# Patient Record
Sex: MALE | Race: WHITE | NOT HISPANIC OR LATINO
[De-identification: names, ages, dates, MRNs, and addresses within clinical notes are randomized per-mention and may not be internally consistent; named-entity substitution may affect disease eponyms.]

---

## 2019-07-02 LAB
APPEARANCE: CLEAR
BACTERIA UR CULT: NORMAL
BACTERIA: NEGATIVE
BILIRUBIN URINE: NEGATIVE
BLOOD URINE: NEGATIVE
COLOR: YELLOW
GLUCOSE QUALITATIVE U: NEGATIVE
HYALINE CASTS: 0 /LPF
KETONES URINE: NEGATIVE
LEUKOCYTE ESTERASE URINE: NEGATIVE
MICROSCOPIC-UA: NORMAL
NITRITE URINE: NEGATIVE
PH URINE: 6
PROTEIN URINE: NEGATIVE
RED BLOOD CELLS URINE: 3 /HPF
SPECIFIC GRAVITY URINE: 1.02
SQUAMOUS EPITHELIAL CELLS: 0 /HPF
UROBILINOGEN URINE: NORMAL
WHITE BLOOD CELLS URINE: 3 /HPF

## 2019-07-03 ENCOUNTER — APPOINTMENT (OUTPATIENT)
Dept: UROLOGY | Facility: CLINIC | Age: 84
End: 2019-07-03
Payer: MEDICARE

## 2019-07-03 DIAGNOSIS — N52.9 MALE ERECTILE DYSFUNCTION, UNSPECIFIED: ICD-10-CM

## 2019-07-03 DIAGNOSIS — R35.1 NOCTURIA: ICD-10-CM

## 2019-07-03 DIAGNOSIS — Z87.440 PERSONAL HISTORY OF URINARY (TRACT) INFECTIONS: ICD-10-CM

## 2019-07-03 PROCEDURE — 51798 US URINE CAPACITY MEASURE: CPT

## 2019-07-03 PROCEDURE — 99203 OFFICE O/P NEW LOW 30 MIN: CPT | Mod: 25

## 2019-07-08 LAB
APPEARANCE: CLEAR
BACTERIA UR CULT: ABNORMAL
BACTERIA: NEGATIVE
BILIRUBIN URINE: NEGATIVE
BLOOD URINE: NEGATIVE
COLOR: YELLOW
GLUCOSE QUALITATIVE U: NEGATIVE
HYALINE CASTS: 1 /LPF
KETONES URINE: NEGATIVE
LEUKOCYTE ESTERASE URINE: NEGATIVE
MICROSCOPIC-UA: NORMAL
NITRITE URINE: NEGATIVE
PH URINE: 6.5
PROTEIN URINE: NORMAL
RED BLOOD CELLS URINE: 1 /HPF
SPECIFIC GRAVITY URINE: 1.02
SQUAMOUS EPITHELIAL CELLS: 0 /HPF
UROBILINOGEN URINE: NORMAL
WHITE BLOOD CELLS URINE: 0 /HPF

## 2019-12-18 ENCOUNTER — APPOINTMENT (OUTPATIENT)
Dept: UROLOGY | Facility: CLINIC | Age: 84
End: 2019-12-18

## 2020-07-15 DIAGNOSIS — N40.1 BENIGN PROSTATIC HYPERPLASIA WITH LOWER URINARY TRACT SYMPMS: ICD-10-CM

## 2020-07-15 DIAGNOSIS — N13.8 BENIGN PROSTATIC HYPERPLASIA WITH LOWER URINARY TRACT SYMPMS: ICD-10-CM

## 2020-07-17 LAB
APPEARANCE: CLEAR
BACTERIA: NEGATIVE
BILIRUBIN URINE: NEGATIVE
BLOOD URINE: NEGATIVE
CALCIUM OXALATE CRYSTALS: ABNORMAL
COLOR: YELLOW
GLUCOSE QUALITATIVE U: NEGATIVE
HYALINE CASTS: 0 /LPF
KETONES URINE: NEGATIVE
LEUKOCYTE ESTERASE URINE: NEGATIVE
MICROSCOPIC-UA: NORMAL
NITRITE URINE: NEGATIVE
PH URINE: 6
PROTEIN URINE: NORMAL
PSA FREE FLD-MCNC: 41 %
PSA FREE SERPL-MCNC: 1.27 NG/ML
PSA SERPL-MCNC: 3.13 NG/ML
RED BLOOD CELLS URINE: 1 /HPF
SPECIFIC GRAVITY URINE: 1.02
SQUAMOUS EPITHELIAL CELLS: 0 /HPF
TESTOST SERPL-MCNC: 463 NG/DL
UROBILINOGEN URINE: NORMAL
WHITE BLOOD CELLS URINE: 0 /HPF

## 2020-07-20 LAB — BACTERIA UR CULT: NORMAL

## 2020-07-27 ENCOUNTER — APPOINTMENT (OUTPATIENT)
Dept: UROLOGY | Facility: CLINIC | Age: 85
End: 2020-07-27
Payer: MEDICARE

## 2020-07-27 VITALS
DIASTOLIC BLOOD PRESSURE: 84 MMHG | HEIGHT: 65 IN | WEIGHT: 167 LBS | BODY MASS INDEX: 27.82 KG/M2 | SYSTOLIC BLOOD PRESSURE: 138 MMHG | OXYGEN SATURATION: 96 % | HEART RATE: 69 BPM | TEMPERATURE: 98 F

## 2020-07-27 DIAGNOSIS — N40.1 BENIGN PROSTATIC HYPERPLASIA WITH LOWER URINARY TRACT SYMPMS: ICD-10-CM

## 2020-07-27 DIAGNOSIS — R30.0 DYSURIA: ICD-10-CM

## 2020-07-27 DIAGNOSIS — R35.0 FREQUENCY OF MICTURITION: ICD-10-CM

## 2020-07-27 DIAGNOSIS — N13.8 BENIGN PROSTATIC HYPERPLASIA WITH LOWER URINARY TRACT SYMPMS: ICD-10-CM

## 2020-07-27 PROCEDURE — 99213 OFFICE O/P EST LOW 20 MIN: CPT

## 2020-07-28 LAB
APPEARANCE: CLEAR
BACTERIA: NEGATIVE
BILIRUBIN URINE: NEGATIVE
BLOOD URINE: NEGATIVE
COLOR: YELLOW
GLUCOSE QUALITATIVE U: NEGATIVE
HYALINE CASTS: 0 /LPF
KETONES URINE: NEGATIVE
LEUKOCYTE ESTERASE URINE: NEGATIVE
MICROSCOPIC-UA: NORMAL
NITRITE URINE: NEGATIVE
PH URINE: 6
PROTEIN URINE: NEGATIVE
RED BLOOD CELLS URINE: 2 /HPF
SPECIFIC GRAVITY URINE: 1.02
SQUAMOUS EPITHELIAL CELLS: 0 /HPF
UROBILINOGEN URINE: NORMAL
WHITE BLOOD CELLS URINE: 0 /HPF

## 2020-12-21 PROBLEM — Z87.440 HISTORY OF URINARY TRACT INFECTION: Status: RESOLVED | Noted: 2019-07-08 | Resolved: 2020-12-21

## 2021-08-28 ENCOUNTER — INPATIENT (INPATIENT)
Facility: HOSPITAL | Age: 86
LOS: 2 days | Discharge: NURSING, REHAB OR LONG-TERM CARE | DRG: 62 | End: 2021-08-31
Attending: INTERNAL MEDICINE | Admitting: INTERNAL MEDICINE
Payer: MEDICARE

## 2021-08-28 VITALS
DIASTOLIC BLOOD PRESSURE: 71 MMHG | OXYGEN SATURATION: 99 % | HEART RATE: 78 BPM | HEIGHT: 66 IN | SYSTOLIC BLOOD PRESSURE: 127 MMHG | RESPIRATION RATE: 17 BRPM | TEMPERATURE: 99 F

## 2021-08-28 DIAGNOSIS — I63.9 CEREBRAL INFARCTION, UNSPECIFIED: ICD-10-CM

## 2021-08-28 LAB
ALBUMIN SERPL ELPH-MCNC: 3.5 G/DL — SIGNIFICANT CHANGE UP (ref 3.3–5)
ALP SERPL-CCNC: 64 U/L — SIGNIFICANT CHANGE UP (ref 40–120)
ALT FLD-CCNC: 25 U/L — SIGNIFICANT CHANGE UP (ref 12–78)
ANION GAP SERPL CALC-SCNC: 3 MMOL/L — LOW (ref 5–17)
APTT BLD: 27.7 SEC — SIGNIFICANT CHANGE UP (ref 27.5–35.5)
AST SERPL-CCNC: 16 U/L — SIGNIFICANT CHANGE UP (ref 15–37)
BASE EXCESS BLDV CALC-SCNC: 1.3 MMOL/L — SIGNIFICANT CHANGE UP (ref -2–2)
BASOPHILS # BLD AUTO: 0.06 K/UL — SIGNIFICANT CHANGE UP (ref 0–0.2)
BASOPHILS NFR BLD AUTO: 0.7 % — SIGNIFICANT CHANGE UP (ref 0–2)
BILIRUB SERPL-MCNC: 0.3 MG/DL — SIGNIFICANT CHANGE UP (ref 0.2–1.2)
BUN SERPL-MCNC: 14 MG/DL — SIGNIFICANT CHANGE UP (ref 7–23)
CALCIUM SERPL-MCNC: 8.9 MG/DL — SIGNIFICANT CHANGE UP (ref 8.5–10.1)
CHLORIDE SERPL-SCNC: 110 MMOL/L — HIGH (ref 96–108)
CO2 SERPL-SCNC: 28 MMOL/L — SIGNIFICANT CHANGE UP (ref 22–31)
CREAT SERPL-MCNC: 1.17 MG/DL — SIGNIFICANT CHANGE UP (ref 0.5–1.3)
EOSINOPHIL # BLD AUTO: 0.15 K/UL — SIGNIFICANT CHANGE UP (ref 0–0.5)
EOSINOPHIL NFR BLD AUTO: 1.8 % — SIGNIFICANT CHANGE UP (ref 0–6)
GLUCOSE SERPL-MCNC: 89 MG/DL — SIGNIFICANT CHANGE UP (ref 70–99)
HCO3 BLDV-SCNC: 27 MMOL/L — SIGNIFICANT CHANGE UP (ref 21–29)
HCT VFR BLD CALC: 39.8 % — SIGNIFICANT CHANGE UP (ref 39–50)
HGB BLD-MCNC: 13.8 G/DL — SIGNIFICANT CHANGE UP (ref 13–17)
IMM GRANULOCYTES NFR BLD AUTO: 0.2 % — SIGNIFICANT CHANGE UP (ref 0–1.5)
INR BLD: 1.04 RATIO — SIGNIFICANT CHANGE UP (ref 0.88–1.16)
LYMPHOCYTES # BLD AUTO: 1.98 K/UL — SIGNIFICANT CHANGE UP (ref 1–3.3)
LYMPHOCYTES # BLD AUTO: 24.2 % — SIGNIFICANT CHANGE UP (ref 13–44)
MCHC RBC-ENTMCNC: 33.8 PG — SIGNIFICANT CHANGE UP (ref 27–34)
MCHC RBC-ENTMCNC: 34.7 GM/DL — SIGNIFICANT CHANGE UP (ref 32–36)
MCV RBC AUTO: 97.5 FL — SIGNIFICANT CHANGE UP (ref 80–100)
MONOCYTES # BLD AUTO: 1.11 K/UL — HIGH (ref 0–0.9)
MONOCYTES NFR BLD AUTO: 13.6 % — SIGNIFICANT CHANGE UP (ref 2–14)
NEUTROPHILS # BLD AUTO: 4.86 K/UL — SIGNIFICANT CHANGE UP (ref 1.8–7.4)
NEUTROPHILS NFR BLD AUTO: 59.5 % — SIGNIFICANT CHANGE UP (ref 43–77)
PCO2 BLDV: 49 MMHG — SIGNIFICANT CHANGE UP (ref 35–50)
PH BLDV: 7.36 — SIGNIFICANT CHANGE UP (ref 7.35–7.45)
PLATELET # BLD AUTO: 206 K/UL — SIGNIFICANT CHANGE UP (ref 150–400)
PO2 BLDV: 47 MMHG — HIGH (ref 25–45)
POTASSIUM SERPL-MCNC: 3.7 MMOL/L — SIGNIFICANT CHANGE UP (ref 3.5–5.3)
POTASSIUM SERPL-SCNC: 3.7 MMOL/L — SIGNIFICANT CHANGE UP (ref 3.5–5.3)
PROT SERPL-MCNC: 6.9 GM/DL — SIGNIFICANT CHANGE UP (ref 6–8.3)
PROTHROM AB SERPL-ACNC: 12.2 SEC — SIGNIFICANT CHANGE UP (ref 10.6–13.6)
RBC # BLD: 4.08 M/UL — LOW (ref 4.2–5.8)
RBC # FLD: 13.2 % — SIGNIFICANT CHANGE UP (ref 10.3–14.5)
SAO2 % BLDV: 81 % — SIGNIFICANT CHANGE UP (ref 67–88)
SODIUM SERPL-SCNC: 141 MMOL/L — SIGNIFICANT CHANGE UP (ref 135–145)
TROPONIN I SERPL-MCNC: <0.015 NG/ML — SIGNIFICANT CHANGE UP (ref 0.01–0.04)
WBC # BLD: 8.18 K/UL — SIGNIFICANT CHANGE UP (ref 3.8–10.5)
WBC # FLD AUTO: 8.18 K/UL — SIGNIFICANT CHANGE UP (ref 3.8–10.5)

## 2021-08-28 PROCEDURE — 92610 EVALUATE SWALLOWING FUNCTION: CPT | Mod: GN

## 2021-08-28 PROCEDURE — 93320 DOPPLER ECHO COMPLETE: CPT

## 2021-08-28 PROCEDURE — 93005 ELECTROCARDIOGRAM TRACING: CPT

## 2021-08-28 PROCEDURE — 93312 ECHO TRANSESOPHAGEAL: CPT

## 2021-08-28 PROCEDURE — 81003 URINALYSIS AUTO W/O SCOPE: CPT

## 2021-08-28 PROCEDURE — 0042T: CPT

## 2021-08-28 PROCEDURE — 83735 ASSAY OF MAGNESIUM: CPT

## 2021-08-28 PROCEDURE — 93010 ELECTROCARDIOGRAM REPORT: CPT

## 2021-08-28 PROCEDURE — 70496 CT ANGIOGRAPHY HEAD: CPT | Mod: 26,MA

## 2021-08-28 PROCEDURE — 80061 LIPID PANEL: CPT

## 2021-08-28 PROCEDURE — 70551 MRI BRAIN STEM W/O DYE: CPT

## 2021-08-28 PROCEDURE — 97116 GAIT TRAINING THERAPY: CPT | Mod: GP

## 2021-08-28 PROCEDURE — 86769 SARS-COV-2 COVID-19 ANTIBODY: CPT

## 2021-08-28 PROCEDURE — 85027 COMPLETE CBC AUTOMATED: CPT

## 2021-08-28 PROCEDURE — 84100 ASSAY OF PHOSPHORUS: CPT

## 2021-08-28 PROCEDURE — 70498 CT ANGIOGRAPHY NECK: CPT | Mod: 26,MA

## 2021-08-28 PROCEDURE — 93306 TTE W/DOPPLER COMPLETE: CPT

## 2021-08-28 PROCEDURE — 97530 THERAPEUTIC ACTIVITIES: CPT | Mod: GP

## 2021-08-28 PROCEDURE — 99291 CRITICAL CARE FIRST HOUR: CPT

## 2021-08-28 PROCEDURE — 80048 BASIC METABOLIC PNL TOTAL CA: CPT

## 2021-08-28 PROCEDURE — 36415 COLL VENOUS BLD VENIPUNCTURE: CPT

## 2021-08-28 PROCEDURE — 93325 DOPPLER ECHO COLOR FLOW MAPG: CPT

## 2021-08-28 PROCEDURE — 92523 SPEECH SOUND LANG COMPREHEN: CPT | Mod: GN

## 2021-08-28 RX ORDER — ALTEPLASE 100 MG
6.6 KIT INTRAVENOUS ONCE
Refills: 0 | Status: COMPLETED | OUTPATIENT
Start: 2021-08-28 | End: 2021-08-28

## 2021-08-28 RX ORDER — ASPIRIN/CALCIUM CARB/MAGNESIUM 324 MG
300 TABLET ORAL DAILY
Refills: 0 | Status: DISCONTINUED | OUTPATIENT
Start: 2021-08-28 | End: 2021-08-28

## 2021-08-28 RX ORDER — ALTEPLASE 100 MG
59.5 KIT INTRAVENOUS ONCE
Refills: 0 | Status: COMPLETED | OUTPATIENT
Start: 2021-08-28 | End: 2021-08-28

## 2021-08-28 RX ORDER — ATORVASTATIN CALCIUM 80 MG/1
40 TABLET, FILM COATED ORAL AT BEDTIME
Refills: 0 | Status: DISCONTINUED | OUTPATIENT
Start: 2021-08-28 | End: 2021-08-28

## 2021-08-28 RX ORDER — ATORVASTATIN CALCIUM 80 MG/1
80 TABLET, FILM COATED ORAL AT BEDTIME
Refills: 0 | Status: DISCONTINUED | OUTPATIENT
Start: 2021-08-28 | End: 2021-08-31

## 2021-08-28 RX ADMIN — ALTEPLASE 59.5 MILLIGRAM(S): KIT at 18:09

## 2021-08-28 RX ADMIN — ALTEPLASE 59.5 MILLIGRAM(S): KIT at 19:09

## 2021-08-28 RX ADMIN — ALTEPLASE 6.6 MILLIGRAM(S): KIT at 18:08

## 2021-08-28 RX ADMIN — ALTEPLASE 396 MILLIGRAM(S): KIT at 18:07

## 2021-08-28 NOTE — ED PROVIDER NOTE - NSICDXPASTSURGICALHX_GEN_ALL_CORE_FT
PAST SURGICAL HISTORY:  History of Cholecystectomy     History of Prior Ablation Treatment     S/P Cataract Surgery left eye

## 2021-08-28 NOTE — ED PROVIDER NOTE - OBJECTIVE STATEMENT
86 year old male with PMHx of benign prostatic hypertrophy, diverticulosis, GERD, supraventricular tachycardia s/p ablation, HLD, osteoarthritis, osteoporosis, cholecystectomy, s/p left cataract surgery presents to the ED BIBEMS c/o sudden onset left UE weakness. EMS reports that the weakness began 90 minutes PTA. Upon arrival, pt has continued significant left UE weakness. Pt reports that he woke up from a nap and couldn't move his left arm, as well as felt numbness in the left arm. Pt further c/o right-sided HA. 86 year old male with PMHx of benign prostatic hypertrophy, diverticulosis, GERD, supraventricular tachycardia s/p ablation, HLD, osteoarthritis, osteoporosis, cholecystectomy, s/p left cataract surgery presents to the ED BIBEMS c/o sudden onset left UE weakness. EMS reports that the weakness began 90 minutes PTA. Upon arrival, pt has continued significant left UE weakness. Pt reports that he woke up from a nap and couldn't move his left arm, as well as felt numbness in the left arm. Pt further c/o right-sided HA. Pt does not take anticoagulants.

## 2021-08-28 NOTE — ED ADULT NURSE NOTE - NSIMPLEMENTINTERV_GEN_ALL_ED
Implemented All Fall with Harm Risk Interventions:  [place] to call system. Call bell, personal items and telephone within reach. Instruct patient to call for assistance. Room bathroom lighting operational. Non-slip footwear when patient is off stretcher. Physically safe environment: no spills, clutter or unnecessary equipment. Stretcher in lowest position, wheels locked, appropriate side rails in place. Provide visual cue, wrist band, yellow gown, etc. Monitor gait and stability. Monitor for mental status changes and reorient to person, place, and time. Review medications for side effects contributing to fall risk. Reinforce activity limits and safety measures with patient and family. Provide visual clues: red socks.

## 2021-08-28 NOTE — H&P ADULT - HISTORY OF PRESENT ILLNESS
Patient is an 86 male who awoke from a nap and he had L arm weakness and tingeing  In the ED he had a negative HCT and was given TPA.  He denies palpitations  He did have an ablation several year ago but denies a Hx of AFIB

## 2021-08-28 NOTE — H&P ADULT - ASSESSMENT
A/P: 86 male who is presenting with a CVA    Plan::  Admit to the ICU  NPO  Q1H neuro checks  Echo  Glycemic sontrol. Lipid profile in AM  A1C in AM  Swallow eval  PT matheus Richardson  Neuro consult

## 2021-08-28 NOTE — ED ADULT TRIAGE NOTE - CHIEF COMPLAINT QUOTE
pt p/w c/o sudden onset left sided weakness which started aprx 90' ago as per EMS.  Upon arrival pt has significant left upper extremity weakness. bgm 97 in triage.

## 2021-08-28 NOTE — ED PROVIDER NOTE - NSICDXPASTMEDICALHX_GEN_ALL_CORE_FT
PAST MEDICAL HISTORY:  Benign Prostatic Hypertrophy     Diverticulosis     GERD (Gastroesophageal Reflux Disease)     History of Supraventricular Tachycardia s/p ablation    Hyperlipidemia     Osteoarthritis     Osteoporosis

## 2021-08-28 NOTE — ED ADULT NURSE NOTE - OBJECTIVE STATEMENT
Pt presents to er with complaints of LUE weakness and left sided facial droop 90mins prior to arrival, pt code stroke called and sent to ct for meeting criteria for TPA, as per , hold TPA administration until radiology read is performed, TPA administered as per 's instruction at 18:07 and infusion initiated at 18:08 (see NIH scale for full score and stroke information). TPA infusing at this time with 1:1 nursing at bedside, pt denies new onset headache, mood is appropriate and pleasant, A&OX4 and gives verbal consent for TPA administration. Med verified with Ana Laura HAWTHORNE

## 2021-08-28 NOTE — ED PROVIDER NOTE - NEUROLOGICAL, MLM
Alert and oriented, no focal deficits, sensory deficits. Left UE weakness. Alert and oriented, no focal deficits, sensory deficits. Left UE weakness. Mild left facial troop. Alert and oriented. Left UE weakness. Left UE numbness. Mild left facial droop.

## 2021-08-28 NOTE — ED ADULT NURSE REASSESSMENT NOTE - NS ED NURSE REASSESS COMMENT FT1
Jordyn [phone] who is pt's daughter and emergency contact is taking pt's wallet home with her at this time.

## 2021-08-28 NOTE — ED PROVIDER NOTE - SKIN, MLM
Skin normal color for race, warm, dry and intact. No evidence of rash. Left elbow abrasions. No evidence of rash.

## 2021-08-28 NOTE — ED PROVIDER NOTE - PROGRESS NOTE DETAILS
Keo ALLEN for Dr. Moon: Delayed TPA due to radiology not reading study. Keo ALLEN for Dr. Moon: Telestroke cart never connected. Discussed case with Telestroke after giving TPA. They do not feel that pt needs transfer.  ICU aware. Will update family.

## 2021-08-29 LAB
A1C WITH ESTIMATED AVERAGE GLUCOSE RESULT: 5.6 % — SIGNIFICANT CHANGE UP (ref 4–5.6)
ANION GAP SERPL CALC-SCNC: 6 MMOL/L — SIGNIFICANT CHANGE UP (ref 5–17)
APPEARANCE UR: CLEAR — SIGNIFICANT CHANGE UP
BILIRUB UR-MCNC: NEGATIVE — SIGNIFICANT CHANGE UP
BUN SERPL-MCNC: 14 MG/DL — SIGNIFICANT CHANGE UP (ref 7–23)
CALCIUM SERPL-MCNC: 8.5 MG/DL — SIGNIFICANT CHANGE UP (ref 8.5–10.1)
CHLORIDE SERPL-SCNC: 108 MMOL/L — SIGNIFICANT CHANGE UP (ref 96–108)
CO2 SERPL-SCNC: 26 MMOL/L — SIGNIFICANT CHANGE UP (ref 22–31)
COLOR SPEC: YELLOW — SIGNIFICANT CHANGE UP
CREAT SERPL-MCNC: 0.98 MG/DL — SIGNIFICANT CHANGE UP (ref 0.5–1.3)
DIFF PNL FLD: NEGATIVE — SIGNIFICANT CHANGE UP
ESTIMATED AVERAGE GLUCOSE: 114 MG/DL — SIGNIFICANT CHANGE UP (ref 68–114)
GLUCOSE SERPL-MCNC: 88 MG/DL — SIGNIFICANT CHANGE UP (ref 70–99)
GLUCOSE UR QL: NEGATIVE MG/DL — SIGNIFICANT CHANGE UP
HCT VFR BLD CALC: 39.1 % — SIGNIFICANT CHANGE UP (ref 39–50)
HGB BLD-MCNC: 13.4 G/DL — SIGNIFICANT CHANGE UP (ref 13–17)
KETONES UR-MCNC: NEGATIVE — SIGNIFICANT CHANGE UP
LEUKOCYTE ESTERASE UR-ACNC: NEGATIVE — SIGNIFICANT CHANGE UP
MAGNESIUM SERPL-MCNC: 2 MG/DL — SIGNIFICANT CHANGE UP (ref 1.6–2.6)
MCHC RBC-ENTMCNC: 33.5 PG — SIGNIFICANT CHANGE UP (ref 27–34)
MCHC RBC-ENTMCNC: 34.3 GM/DL — SIGNIFICANT CHANGE UP (ref 32–36)
MCV RBC AUTO: 97.8 FL — SIGNIFICANT CHANGE UP (ref 80–100)
NITRITE UR-MCNC: NEGATIVE — SIGNIFICANT CHANGE UP
PH UR: 8 — SIGNIFICANT CHANGE UP (ref 5–8)
PLATELET # BLD AUTO: 205 K/UL — SIGNIFICANT CHANGE UP (ref 150–400)
POTASSIUM SERPL-MCNC: 4 MMOL/L — SIGNIFICANT CHANGE UP (ref 3.5–5.3)
POTASSIUM SERPL-SCNC: 4 MMOL/L — SIGNIFICANT CHANGE UP (ref 3.5–5.3)
PROT UR-MCNC: NEGATIVE MG/DL — SIGNIFICANT CHANGE UP
RBC # BLD: 4 M/UL — LOW (ref 4.2–5.8)
RBC # FLD: 13.2 % — SIGNIFICANT CHANGE UP (ref 10.3–14.5)
SODIUM SERPL-SCNC: 140 MMOL/L — SIGNIFICANT CHANGE UP (ref 135–145)
SP GR SPEC: 1.01 — SIGNIFICANT CHANGE UP (ref 1.01–1.02)
UROBILINOGEN FLD QL: NEGATIVE MG/DL — SIGNIFICANT CHANGE UP
WBC # BLD: 8.99 K/UL — SIGNIFICANT CHANGE UP (ref 3.8–10.5)
WBC # FLD AUTO: 8.99 K/UL — SIGNIFICANT CHANGE UP (ref 3.8–10.5)

## 2021-08-29 PROCEDURE — 99223 1ST HOSP IP/OBS HIGH 75: CPT

## 2021-08-29 PROCEDURE — 99291 CRITICAL CARE FIRST HOUR: CPT

## 2021-08-29 PROCEDURE — 70551 MRI BRAIN STEM W/O DYE: CPT | Mod: 26

## 2021-08-29 RX ORDER — ASPIRIN/CALCIUM CARB/MAGNESIUM 324 MG
325 TABLET ORAL DAILY
Refills: 0 | Status: DISCONTINUED | OUTPATIENT
Start: 2021-08-29 | End: 2021-08-31

## 2021-08-29 RX ORDER — ACETAMINOPHEN 500 MG
650 TABLET ORAL ONCE
Refills: 0 | Status: COMPLETED | OUTPATIENT
Start: 2021-08-29 | End: 2021-08-29

## 2021-08-29 RX ADMIN — ATORVASTATIN CALCIUM 80 MILLIGRAM(S): 80 TABLET, FILM COATED ORAL at 23:03

## 2021-08-29 RX ADMIN — Medication 650 MILLIGRAM(S): at 05:08

## 2021-08-29 RX ADMIN — Medication 325 MILLIGRAM(S): at 18:59

## 2021-08-29 RX ADMIN — Medication 650 MILLIGRAM(S): at 06:00

## 2021-08-29 NOTE — PHYSICAL THERAPY INITIAL EVALUATION ADULT - PERTINENT HX OF CURRENT PROBLEM, REHAB EVAL
86 male who awoke from a nap and he had L arm weakness and tingeing  In the ED he had a negative HCT and was given TPA.  He denies palpitations  He did have an ablation several year ago but denies a Hx of AFIB

## 2021-08-29 NOTE — SWALLOW BEDSIDE ASSESSMENT ADULT - SWALLOW EVAL: RECOMMENDED FEEDING/EATING TECHNIQUES
allow for swallow between intakes/alternate food with liquid/hard swallow w/ each bite or sip/tuck chin

## 2021-08-29 NOTE — SWALLOW BEDSIDE ASSESSMENT ADULT - SWALLOW EVAL: RECOMMENDED DIET
regular consistency solid and liquid:  small sip . set up tray and open containers.  left arm weakness.

## 2021-08-29 NOTE — SWALLOW BEDSIDE ASSESSMENT ADULT - SLP GENERAL OBSERVATIONS
pt seated in bed, upright and awake: restricted eye gaze  left eye.  immediately interactively verbal and conversational. Motor speech within normal limits; no language deficits.

## 2021-08-29 NOTE — CONSULT NOTE ADULT - SUBJECTIVE AND OBJECTIVE BOX
Patient is a 86y old  Male who presents with a chief complaint of CVA. Called to see pt evaluate Post TPA given yesterday.     HPI:  Patient is an 86 male who awoke from a nap and he had L arm weakness and tingeing  In the ED he had a negative HCT and was given TPA.  He denies palpitations  He did have an ablation several year ago but denies a Hx of AFIB . Today still has left side weakness Arm> than LE. Speech dysarthric and [place] asking for hearing aids. c/o Mild Rt side headache . No other c/o.       PAST MEDICAL & SURGICAL HISTORY:  GERD (Gastroesophageal Reflux Disease)  Hyperlipidemia  Benign Prostatic Hypertrophy  Diverticulosis  Osteoarthritis  Osteoporosis  History of Supraventricular Tachycardia  s/p ablation  History of Cholecystectomy  History of Prior Ablation Treatment  S/P Cataract Surgery  left eye    FAMILY HISTORY:    Social Hx:  Nonsmoker, no drug or alcohol use    MEDICATIONS  (STANDING):  atorvastatin 80 milliGRAM(s) Oral at bedtime       Allergies  sulfa drugs (Unknown)    ROS: Pertinent positives in HPI, all other ROS were reviewed and are negative.      Vital Signs Last 24 Hrs  T(C): 36.7 (29 Aug 2021 09:00), Max: 37 (28 Aug 2021 17:19)  T(F): 98 (29 Aug 2021 09:00), Max: 98.6 (28 Aug 2021 17:19)  HR: 63 (29 Aug 2021 10:00) (54 - 78)  BP: 136/75 (29 Aug 2021 10:00) (97/59 - 157/78)  BP(mean): 92 (29 Aug 2021 10:00) (65 - 125)  RR: 14 (29 Aug 2021 10:00) (12 - 21)  SpO2: 97% (29 Aug 2021 10:00) (94% - 100%)    Constitutional: awake and alert.  HEENT: PERRLA, EOMI,   Neck: Supple.  Respiratory: Breath sounds are clear bilaterally  Cardiovascular: S1 and S2, regular  rhythm  Gastrointestinal: soft, nontender  Extremities:  no edema  Vascular:  Carotid Bruit - no  Musculoskeletal: no joint swelling/tenderness, left side weakness  Skin: No rashes    Neurological exam:  HF: A x O x 3. Appropriately interactive, normal affect. Speech Dysarthric   CN: MICHELLE, EOMI, Left VF neglect, Vf defect  facial sensation  decreased., left facial weakness [place] , Gag   not be tested.  Motor: left UE weakness 2/5  LLE 4/5 Rt side 5/5     Sens: Intact to light touch    Reflexes:  slightly brisk Left > Rt Plantars Down Rt Up going on Left  Coord:  Not able to do on left   Gait/Balance: Cannot test    NIHSS: 8      Labs:   08-29    140  |  108  |  14  ----------------------------<  88  4.0   |  26  |  0.98    Ca    8.5      29 Aug 2021 06:17  Phos  2.9     08-29  Mg     2.0     08-29    TPro  6.9  /  Alb  3.5  /  TBili  0.3  /  DBili  x   /  AST  16  /  ALT  25  /  AlkPhos  64  08-28                          13.4   8.99  )-----------( 205      ( 29 Aug 2021 06:17 )             39.1       Radiology:  - CT Head:  < from: CT Brain Stroke Protocol (08.28.21 @ 17:25) >  IMPRESSION:      1. The perfusion study is abnormal.  2. Focal calcified plaque in the proximal internal carotid artery on the right with at least moderate narrowing but no occlusion.  This may account for hypoperfusion of 254 mL at theTmax> 4 seconds. There is 20 mL hypoperfusion in the distal right MCA territory at the 6 second threshold  3. The other head and neck vessels are patent. No intraluminal thrombus noted intracranially.  4. A noncontrast CT study shows no evidence of hemorrhage, with generalized volume loss and involutional change. 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    Follow-up MR imaging of the brain recommended for further assessment.    < end of copied text >

## 2021-08-29 NOTE — PROVIDER CONTACT NOTE (EICU) - BACKGROUND
passed swallow evaluation per bedside nurse.  primary ICU team attending to another ICU patient at present time

## 2021-08-29 NOTE — SWALLOW BEDSIDE ASSESSMENT ADULT - ORAL PHASE
able to masticate despite facial weakness. lingual sweep for collection. pt talked while chewing dry cracker, and was inattentive to direction not to do so. Thisis likely his habitual practice./Within functional limits

## 2021-08-29 NOTE — SWALLOW BEDSIDE ASSESSMENT ADULT - SWALLOW EVAL: CRITERIA FOR SKILLED INTERVENTION MET
kandy follow briefly for continued tolerance./demonstrates skilled criteria for swallowing intervention

## 2021-08-29 NOTE — SWALLOW BEDSIDE ASSESSMENT ADULT - NS SPL SWALLOW CLINIC TRIAL FT
Strategies were disc, but pt appeared mildly inattentive:  Take liquid in small bolus volumes, be aware of weakness on the left side where food might pocket. clean mouth after meals.  do not rush eating.   meds as tolerated.   service will follow peripherally. Disc with Nsg.

## 2021-08-29 NOTE — SWALLOW BEDSIDE ASSESSMENT ADULT - SWALLOW EVAL: DIAGNOSIS
mild oral dysphagia 2/2 to present facial droop and weakness, but can manage regular consistency: somewhat dismissive and inattentive to strategies for maintaining swallow safety..  Motor speech is normally fluent, with no overt s/s dysarthria.  pt has relatively strong [place] accent. No overt s/s primary linguistic pathology. Given ocus of CVA, that is in any event not expected.

## 2021-08-29 NOTE — SWALLOW BEDSIDE ASSESSMENT ADULT - PHARYNGEAL PHASE
timely onset of pharyngeal swallow with observable laryngeal lift. No overt immediate s/s aspiration

## 2021-08-29 NOTE — SWALLOW BEDSIDE ASSESSMENT ADULT - COMMENTS
6 male who awoke from a nap and he had L arm weakness and tingeing  In the ED he had a negative HCT and was given TPA.  He denies palpitations  He did have an ablation several year ago but denies a Hx of AFIB.  Service is asked to evaluate for speech and for swallow functions.  Pt is [place] and wears hearing aids

## 2021-08-30 DIAGNOSIS — I63.9 CEREBRAL INFARCTION, UNSPECIFIED: ICD-10-CM

## 2021-08-30 DIAGNOSIS — E78.5 HYPERLIPIDEMIA, UNSPECIFIED: ICD-10-CM

## 2021-08-30 LAB
ANION GAP SERPL CALC-SCNC: 4 MMOL/L — LOW (ref 5–17)
BUN SERPL-MCNC: 16 MG/DL — SIGNIFICANT CHANGE UP (ref 7–23)
CALCIUM SERPL-MCNC: 8.5 MG/DL — SIGNIFICANT CHANGE UP (ref 8.5–10.1)
CHLORIDE SERPL-SCNC: 109 MMOL/L — HIGH (ref 96–108)
CHOLEST SERPL-MCNC: 124 MG/DL — SIGNIFICANT CHANGE UP
CO2 SERPL-SCNC: 26 MMOL/L — SIGNIFICANT CHANGE UP (ref 22–31)
COVID-19 SPIKE DOMAIN AB INTERP: POSITIVE
COVID-19 SPIKE DOMAIN ANTIBODY RESULT: 210 U/ML — HIGH
CREAT SERPL-MCNC: 0.9 MG/DL — SIGNIFICANT CHANGE UP (ref 0.5–1.3)
GLUCOSE SERPL-MCNC: 98 MG/DL — SIGNIFICANT CHANGE UP (ref 70–99)
HCT VFR BLD CALC: 38.8 % — LOW (ref 39–50)
HDLC SERPL-MCNC: 35 MG/DL — LOW
HGB BLD-MCNC: 13.9 G/DL — SIGNIFICANT CHANGE UP (ref 13–17)
LIPID PNL WITH DIRECT LDL SERPL: 74 MG/DL — SIGNIFICANT CHANGE UP
MAGNESIUM SERPL-MCNC: 2.1 MG/DL — SIGNIFICANT CHANGE UP (ref 1.6–2.6)
MCHC RBC-ENTMCNC: 34.2 PG — HIGH (ref 27–34)
MCHC RBC-ENTMCNC: 35.8 GM/DL — SIGNIFICANT CHANGE UP (ref 32–36)
MCV RBC AUTO: 95.6 FL — SIGNIFICANT CHANGE UP (ref 80–100)
NON HDL CHOLESTEROL: 90 MG/DL — SIGNIFICANT CHANGE UP
PHOSPHATE SERPL-MCNC: 2.6 MG/DL — SIGNIFICANT CHANGE UP (ref 2.5–4.5)
PLATELET # BLD AUTO: 191 K/UL — SIGNIFICANT CHANGE UP (ref 150–400)
POTASSIUM SERPL-MCNC: 4.1 MMOL/L — SIGNIFICANT CHANGE UP (ref 3.5–5.3)
POTASSIUM SERPL-SCNC: 4.1 MMOL/L — SIGNIFICANT CHANGE UP (ref 3.5–5.3)
RBC # BLD: 4.06 M/UL — LOW (ref 4.2–5.8)
RBC # FLD: 13 % — SIGNIFICANT CHANGE UP (ref 10.3–14.5)
SARS-COV-2 IGG+IGM SERPL QL IA: 210 U/ML — HIGH
SARS-COV-2 IGG+IGM SERPL QL IA: POSITIVE
SODIUM SERPL-SCNC: 139 MMOL/L — SIGNIFICANT CHANGE UP (ref 135–145)
TRIGL SERPL-MCNC: 78 MG/DL — SIGNIFICANT CHANGE UP
WBC # BLD: 8.22 K/UL — SIGNIFICANT CHANGE UP (ref 3.8–10.5)
WBC # FLD AUTO: 8.22 K/UL — SIGNIFICANT CHANGE UP (ref 3.8–10.5)

## 2021-08-30 PROCEDURE — 99223 1ST HOSP IP/OBS HIGH 75: CPT

## 2021-08-30 PROCEDURE — 99232 SBSQ HOSP IP/OBS MODERATE 35: CPT

## 2021-08-30 PROCEDURE — 99233 SBSQ HOSP IP/OBS HIGH 50: CPT

## 2021-08-30 PROCEDURE — 93306 TTE W/DOPPLER COMPLETE: CPT | Mod: 26

## 2021-08-30 RX ORDER — LATANOPROST 0.05 MG/ML
1 SOLUTION/ DROPS OPHTHALMIC; TOPICAL AT BEDTIME
Refills: 0 | Status: DISCONTINUED | OUTPATIENT
Start: 2021-08-30 | End: 2021-08-31

## 2021-08-30 RX ORDER — HEPARIN SODIUM 5000 [USP'U]/ML
5000 INJECTION INTRAVENOUS; SUBCUTANEOUS EVERY 8 HOURS
Refills: 0 | Status: DISCONTINUED | OUTPATIENT
Start: 2021-08-30 | End: 2021-08-31

## 2021-08-30 RX ADMIN — Medication 325 MILLIGRAM(S): at 11:41

## 2021-08-30 RX ADMIN — HEPARIN SODIUM 5000 UNIT(S): 5000 INJECTION INTRAVENOUS; SUBCUTANEOUS at 20:31

## 2021-08-30 RX ADMIN — ATORVASTATIN CALCIUM 80 MILLIGRAM(S): 80 TABLET, FILM COATED ORAL at 20:31

## 2021-08-30 RX ADMIN — LATANOPROST 1 DROP(S): 0.05 SOLUTION/ DROPS OPHTHALMIC; TOPICAL at 20:31

## 2021-08-30 RX ADMIN — HEPARIN SODIUM 5000 UNIT(S): 5000 INJECTION INTRAVENOUS; SUBCUTANEOUS at 14:56

## 2021-08-30 NOTE — CONSULT NOTE ADULT - SUBJECTIVE AND OBJECTIVE BOX
86yM was admitted on     In ED, GCS=    Patient is a 86y old  Male who presents with a chief complaint of CVA (29 Aug 2021 12:11)    HPI:  Patient is an 86 male who awoke from a nap and he had L arm weakness and tingleing  In the ED he had a negative HCT and was given TPA.  He denies palpitations  He did have an ablation several year ago but denies a Hx of AFIB (28 Aug 2021 18:40)l    Imaging performed:  MR Head: Small acute uncomplicated right watershed infarct as above  EXAM: CT PERFUSION W MAPS IC  EXAM: CT ANGIO BRAIN (W)AW IC  EXAM: CT BRAIN STROKE PROTOCOL  EXAM: CT ANGIO NECK (W)AW IC  PROCEDURE DATE: 2021    IMPRESSION:  1. The perfusion study is abnormal.  2. Focal calcified plaque in the proximal internal carotid artery on the right with at least moderate narrowing but no occlusion. This may account for hypoperfusion of 254 mL at theTmax> 4 seconds. There is 20 mL hypoperfusion in the distal right MCA territory at the 6 second threshold  3. The other head and neck vessels are patent. No intraluminal thrombus noted intracranially.  4. A noncontrast CT study shows no evidence of hemorrhage, with generalized volume loss and involutional change. 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    REVIEW OF SYSTEMS  Constitutional - No fever, No weight loss, No fatigue  HEENT - No eye pain, No visual disturbances, No difficulty hearing, No tinnitus, No vertigo, No neck pain  Respiratory - No cough, No wheezing, No shortness of breath  Cardiovascular - No chest pain, No palpitations  Gastrointestinal - No abdominal pain, No nausea, No vomiting, No diarrhea, No constipation  Genitourinary - No dysuria, No frequency, No hematuria, No incontinence  Neurological - No headaches, No memory loss, No loss of strength, No numbness, No tremors  Skin - No itching, No rashes, No lesions   Endocrine - No temperature intolerance  Musculoskeletal - No joint pain, No joint swelling, No muscle pain  Psychiatric - No depression, No anxiety    VITALS  T(C): 36.8 (21 @ 06:53), Max: 37 (21 @ 21:27)  HR: 76 (21 @ 09:00) (55 - 77)  BP: 124/76 (21 @ 09:00) (97/60 - 136/75)  RR: 14 (21 @ 09:00) (10 - 17)  SpO2: 97% (21 @ 07:00) (92% - 98%)  Wt(kg): --    PAST MEDICAL & SURGICAL HISTORY  GERD (Gastroesophageal Reflux Disease)    Hyperlipidemia    Benign Prostatic Hypertrophy    Diverticulosis    Osteoarthritis    Osteoporosis    History of Supraventricular Tachycardia    History of Cholecystectomy    History of Prior Ablation Treatment    S/P Cataract Surgery        SOCIAL HISTORY - as per documentation/history  Smoking - None  EtOH - None  Drugs - None    FUNCTIONAL HISTORY  Lives   Independent    CURRENT FUNCTIONAL STATUS      FAMILY HISTORY       RECENT LABS - Reviewed  CBC Full  -  ( 30 Aug 2021 06:25 )  WBC Count : 8.22 K/uL  RBC Count : 4.06 M/uL  Hemoglobin : 13.9 g/dL  Hematocrit : 38.8 %  Platelet Count - Automated : 191 K/uL  Mean Cell Volume : 95.6 fl  Mean Cell Hemoglobin : 34.2 pg  Mean Cell Hemoglobin Concentration : 35.8 gm/dL  Auto Neutrophil # : x  Auto Lymphocyte # : x  Auto Monocyte # : x  Auto Eosinophil # : x  Auto Basophil # : x  Auto Neutrophil % : x  Auto Lymphocyte % : x  Auto Monocyte % : x  Auto Eosinophil % : x  Auto Basophil % : x    08-    139  |  109<H>  |  16  ----------------------------<  98  4.1   |  26  |  0.90    Ca    8.5      30 Aug 2021 06:25  Phos  2.6     -  Mg     2.1         TPro  6.9  /  Alb  3.5  /  TBili  0.3  /  DBili  x   /  AST  16  /  ALT  25  /  AlkPhos  64  08-28    Urinalysis Basic - ( 29 Aug 2021 20:37 )    Color: Yellow / Appearance: Clear / S.010 / pH: x  Gluc: x / Ketone: Negative  / Bili: Negative / Urobili: Negative mg/dL   Blood: x / Protein: Negative mg/dL / Nitrite: Negative   Leuk Esterase: Negative / RBC: x / WBC x   Sq Epi: x / Non Sq Epi: x / Bacteria: x        ALLERGIES  sulfa drugs (Unknown)      MEDICATIONS   aspirin enteric coated 325 milliGRAM(s) Oral daily  atorvastatin 80 milliGRAM(s) Oral at bedtime      ----------------------------------------------------------------------------------------  PHYSICAL EXAM  Constitutional - NAD, Comfortable  HEENT - NCAT, EOMI  Neck - Supple, No limited ROM  Chest - Breathing comfortably, No wheezing  Cardiovascular - S1S2   Abdomen - Soft   Extremities - No C/C/E, No calf tenderness   Neurologic Exam -                    Cognitive - AAO to self, place, date, year, situation     Communication - Fluent, No dysarthria     Cranial Nerves - CN 2-12 intact     Motor - No focal deficits                    LEFT    UE - ShAB 5/5, EF 5/5, EE 5/5, WE 5/5,  5/5                    RIGHT UE - ShAB 5/5, EF 5/5, EE 5/5, WE 5/5,  5/5                    LEFT    LE - HF 5/5, KE 5/5, DF 5/5, PF 5/5                    RIGHT LE - HF 5/5, KE 5/5, DF 5/5, PF 5/5        Sensory - Intact to LT     Reflexes - DTR Intact, No primitive reflexive     Coordination - FTN intact     OculoVestibular - No saccades, No nystagmus, VOR         Balance - WNL Static  Psychiatric - Mood stable, Affect WNL  ----------------------------------------------------------------------------------------  ASSESSMENT/PLAN  86yMale with functional deficits after  Pain - Tylenol  DVT PPX - SCDs  Rehab -    Recommend ACUTE inpatient rehabilitation for the functional deficits consisting of 3 hours of therapy/day & 24 hour RN/daily PMR physician for comorbid medical management. Patient will be able to tolerate 3 hours a day.   Recommend LISET, patient DOES NOT meet acute inpatient rehabilitation criteria. Patient needs a more prolonged stay to achieve transition to community.    Expect patient to achieve functional goals for DC HOME with OUTPATIENT   Expect patient to achieve functional goals for DC HOME with HOME CARE   Follow up with CONCUSSION PROGRAM - Call [phone] for an appointment    Will continue to follow. Functional progress will determine ongoing rehab dispo recommendations, which may change.    Continue bedside therapy as well as OOB throughout the day with mobilization by staff to maintain cardiopulmonary function and prevention of secondary complications related to debility.     Discussed with rehab team.  86yM was admitted on     In ED, GCS=    Patient is a 86y old  Male who presents with a chief complaint of CVA (29 Aug 2021 12:11)    HPI:  Patient is an 86 male who awoke from a nap and he had L arm weakness and tingleing  In the ED he had a negative HCT and was given TPA.  He denies palpitations  He did have an ablation several year ago but denies a Hx of AFIB (28 Aug 2021 18:40)l He received TPA yesterday.  He has LUE paresis and left lower extremity weakness.     Imaging performed:  MR Head: Small acute uncomplicated right watershed infarct as above  EXAM: CT PERFUSION W MAPS IC  EXAM: CT ANGIO BRAIN (W)AW IC  EXAM: CT BRAIN STROKE PROTOCOL  EXAM: CT ANGIO NECK (W)AW IC  PROCEDURE DATE: 2021    IMPRESSION:  1. The perfusion study is abnormal.  2. Focal calcified plaque in the proximal internal carotid artery on the right with at least moderate narrowing but no occlusion. This may account for hypoperfusion of 254 mL at theTmax> 4 seconds. There is 20 mL hypoperfusion in the distal right MCA territory at the 6 second threshold  3. The other head and neck vessels are patent. No intraluminal thrombus noted intracranially.  4. A noncontrast CT study shows no evidence of hemorrhage, with generalized volume loss and involutional change. 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    REVIEW OF SYSTEMS  Constitutional - No fever, No weight loss, No fatigue  HEENT - No eye pain, No visual disturbances, No difficulty hearing, No tinnitus, No vertigo, No neck pain  Respiratory - No cough, No wheezing, No shortness of breath  Cardiovascular - No chest pain, No palpitations  Gastrointestinal - No abdominal pain, No nausea, No vomiting, No diarrhea, No constipation  Genitourinary - No dysuria, No frequency, No hematuria, No incontinence  Neurological - No headaches, No memory loss, No loss of strength, No numbness, No tremors  Skin - No itching, No rashes, No lesions   Endocrine - No temperature intolerance  Musculoskeletal - No joint pain, No joint swelling, No muscle pain  Psychiatric - No depression, No anxiety    VITALS  T(C): 36.8 (21 @ 06:53), Max: 37 (21 @ 21:27)  HR: 76 (21 @ 09:00) (55 - 77)  BP: 124/76 (21 @ 09:00) (97/60 - 136/75)  RR: 14 (21 @ 09:00) (10 - 17)  SpO2: 97% (21 @ 07:00) (92% - 98%)  Wt(kg): --    PAST MEDICAL & SURGICAL HISTORY  GERD (Gastroesophageal Reflux Disease)    Hyperlipidemia    Benign Prostatic Hypertrophy    Diverticulosis    Osteoarthritis    Osteoporosis    History of Supraventricular Tachycardia    History of Cholecystectomy    History of Prior Ablation Treatment    S/P Cataract Surgery        SOCIAL HISTORY - as per documentation/history  Smoking - None  EtOH - None  Drugs - None    FUNCTIONAL HISTORY  L[facility] in [facility]. He is residing in [facility] with [facility]. PTA I Amb w/o AD  Independent    CURRENT FUNCTIONAL STATUS  Supine to sit with minimum assist of onhe persone  sit to stand with minimum assist with CG    RECENT LABS - Reviewed  CBC Full  -  ( 30 Aug 2021 06:25 )  WBC Count : 8.22 K/uL  RBC Count : 4.06 M/uL  Hemoglobin : 13.9 g/dL  Hematocrit : 38.8 %  Platelet Count - Automated : 191 K/uL  Mean Cell Volume : 95.6 fl  Mean Cell Hemoglobin : 34.2 pg  Mean Cell Hemoglobin Concentration : 35.8 gm/dL  Auto Neutrophil # : x  Auto Lymphocyte # : x  Auto Monocyte # : x  Auto Eosinophil # : x  Auto Basophil # : x  Auto Neutrophil % : x  Auto Lymphocyte % : x  Auto Monocyte % : x  Auto Eosinophil % : x  Auto Basophil % : x    08-    139  |  109<H>  |  16  ----------------------------<  98  4.1   |  26  |  0.90    Ca    8.5      30 Aug 2021 06:25  Phos  2.6     08-30  Mg     2.1     08-30    TPro  6.9  /  Alb  3.5  /  TBili  0.3  /  DBili  x   /  AST  16  /  ALT  25  /  AlkPhos  64  08-28    Urinalysis Basic - ( 29 Aug 2021 20:37 )    Color: Yellow / Appearance: Clear / S.010 / pH: x  Gluc: x / Ketone: Negative  / Bili: Negative / Urobili: Negative mg/dL   Blood: x / Protein: Negative mg/dL / Nitrite: Negative   Leuk Esterase: Negative / RBC: x / WBC x   Sq Epi: x / Non Sq Epi: x / Bacteria: x        ALLERGIES  sulfa drugs (Unknown)      MEDICATIONS   aspirin enteric coated 325 milliGRAM(s) Oral daily  atorvastatin 80 milliGRAM(s) Oral at bedtime      ----------------------------------------------------------------------------------------  PHYSICAL EXAM  Constitutional - NAD, Comfortable  HEENT - NCAT, EOMI [place] has hearing aids Left facial droop  Neck - Supple, No limited ROM  Chest - Breathing comfortably, No wheezing  Cardiovascular - S1S2   Abdomen - Soft   Extremities - No C/C/E, No calf tenderness   Neurologic Exam -                    Cognitive - AAO to self, place, date, year, situation     Communication - Fluent, No dysarthria     Cranial Nerves - CN 2-12 intact     Motor - No focal deficits                    LEFT    UE - ShAB 2/, EF 1/5  EE 1/5 WE 0/5,  0/5                    RIGHT UE - ShAB 5/5, EF 5/5, EE 5/5, WE 5/5,  5/5                    LEFT    LE - HF 4/5, KE 4/5, DF 4/5, PF 4/5                    RIGHT LE - HF 5/5, KE 5/5, DF 5/5, PF 5/5        Sensory - Intact to LT     Reflexes - DTR Intact, No primitive reflexive     Coordination - FTN intact     OculoVestibular - No saccades, No nystagmus, VOR         Balance - fair Static  Psychiatric - Mood stable, Affect WNL  ----------------------------------------------------------------------------------------

## 2021-08-30 NOTE — CONSULT NOTE ADULT - SUBJECTIVE AND OBJECTIVE BOX
85 y/o     *s/p ablation for arrhythmia (SVT?)  *HLP  *no other prior cardiac history.    admitted for stroke.  Awoke on day of admit from nap & had L arm weakness & tingling.  In ED CT scan neg & was given TPA.      Patient is an 86 male who awoke from a nap and he had L arm weakness and tingeing  In the ED he had a negative HCT and was given TPA.  He denies palpitations  He did have an ablation several year ago but denies a Hx of AFIB (28 Aug 2021 18:40)  MRI head showed "small uncomplicated right MCA  watershed infarct."    CT angio of brain showed proximal internal carotid artery w/ "at least moderate narrowing" but no occlusion.    Pt denies any cardiac symptoms prior to CVA event:  no palpitaitons, chest discomfort, dyspnea, lightheadness, syncope.    Still has neuro deficits.    PAST MEDICAL AND SURGICAL HISTORY:  GERD (Gastroesophageal Reflux Disease)  Hyperlipidemia  Benign Prostatic Hypertrophy  Diverticulosis  Osteoarthritis  Osteoporosis  History of Supraventricular Tachycardia  s/p ablation  History of Cholecystectomy  Historyof Prior Ablation Treatment  S/P Cataract Surgery  left eye    ALLERGIES:  Allergies  sulfa drugs (Unknown)  Intolerances      SOCIAL HISTORY:  neg x 3      FAMILY  HISTORY:  noncontributory given age.    MEDICATIONS:  OUTPATIENT:  Home Medications:        INPATIENT:  MEDICATIONS  (STANDING):  aspirin enteric coated 325 milliGRAM(s) Oral daily  atorvastatin 80 milliGRAM(s) Oral at bedtime  heparin   Injectable 5000 Unit(s) SubCutaneous every 8 hours  latanoprost 0.005% Ophthalmic Solution 1 Drop(s) Both EYES at bedtime    MEDICATIONS  (PRN):    MEDICATIONS  (PRN):    REVIEW OF SYSTEMS:    CONSTITUTIONAL: No weakness, fevers or chills  EYES/ENT: No visual changes;  No vertigo or throat pain   NECK: No pain or stiffness  RESPIRATORY: No cough, wheezing, hemoptysis; No shortness of breath  CARDIOVASCULAR: No chest pain or palpitations  GASTROINTESTINAL: No abdominal or epigastric pain. No nausea, vomiting, or hematemesis; No diarrhea or constipation. No melena or hematochezia.  GENITOURINARY: No dysuria, frequency or hematuria  NEUROLOGICAL: No numbness or weakness  SKIN: No itching, burning, rashes, or lesions   All other review of systems is negative unless indicated above    Vital Signs Last 24 Hrs  T(C): 36.6 (30 Aug 2021 14:24), Max: 37 (29 Aug 2021 21:27)  T(F): 97.9 (30 Aug 2021 14:24), Max: 98.6 (29 Aug 2021 21:27)  HR: 73 (30 Aug 2021 15:00) (55 - 77)  BP: 116/73 (30 Aug 2021 15:00) (97/60 - 132/65)  BP(mean): 82 (30 Aug 2021 15:00) (65 - 93)  RR: 18 (30 Aug 2021 15:00) (10 - 19)  SpO2: 96% (30 Aug 2021 15:00) (93% - 98%)    I&O's Summary    29 Aug 2021 07:01  -  30 Aug 2021 07:00  --------------------------------------------------------  IN: 0 mL / OUT: 1300 mL / NET: -1300 mL    30 Aug 2021 07:01  -  30 Aug 2021 18:04  --------------------------------------------------------  IN: 0 mL / OUT: 500 mL / NET: -500 mL        PHYSICAL EXAM:    Constitutional: NAD, awake and alert, well-developed  HEENT: PERR, EOMI,  No oral cyananosis.  Neck:  supple,  No JVD  Respiratory: Breath sounds are clear bilaterally, No wheezing, rales or rhonchi  Cardiovascular: S1 and S2, regular rate and rhythm, no Murmurs, gallops or rubs  Gastrointestinal: Bowel Sounds present, soft, nontender.   Extremities: No peripheral edema. No clubbing or cyanosis.  Vascular: 2+ peripheral pulses  Neurological: A/O x 3, no focal deficits  Musculoskeletal: no calf tenderness.  Skin: No rashes.      LABS: All Labs Reviewed:                        13.9   8.22  )-----------( 191      ( 30 Aug 2021 06:25 )             38.8                         13.4   8.99  )-----------( 205      ( 29 Aug 2021 06:17 )             39.1                         13.8   8.18  )-----------( 206      ( 28 Aug 2021 17:30 )             39.8     30 Aug 2021 06:25    139    |  109    |  16     ----------------------------<  98     4.1     |  26     |  0.90   29 Aug 2021 06:17    140    |  108    |  14     ----------------------------<  88     4.0     |  26     |  0.98   28 Aug 2021 17:30    141    |  110    |  14     ----------------------------<  89     3.7     |  28     |  1.17     Ca    8.5        30 Aug 2021 06:25  Ca    8.5        29 Aug 2021 06:17  Ca    8.9        28 Aug 2021 17:30  Phos  2.6       30 Aug 2021 06:25  Phos  2.9       29 Aug 2021 06:17  Mg     2.1       30 Aug 2021 06:25  Mg     2.0       29 Aug 2021 06:17    TPro  6.9    /  Alb  3.5    /  TBili  0.3    /  DBili  x      /  AST  16     /  ALT  25     /  AlkPhos  64     28 Aug 2021 17:30    ===============================  EKG: NSR, no ischemic changes      TELE: NSR  ===============================  RADIOLOGY:  --------------------------------  C< from: CT Brain Stroke Protocol (08.28.21 @ 17:25) >  IMPRESSION:      1. The perfusion study is abnormal.  2. Focal calcified plaque in the proximal internal carotid artery on the right with at least moderate narrowing but no occlusion.  This may account for hypoperfusion of 254 mL at theTmax> 4 seconds. There is 20 mL hypoperfusion in the distal right MCA territory at the 6 second threshold  3. The other head and neck vessels are patent. No intraluminal thrombus noted intracranially.  4. A noncontrast CT study shows no evidence of hemorrhage, with generalized volume loss and involutional change. 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    Follow-up MR imaging of the brain recommended for further assessment.    Physician: ALEA HALIE JESUS MANUEL [id number] was notified at 6:00 PM.    --- End of Report ---            IVONNE SALDANA MD; Attending Radiologist  This document has been electronically signed. Aug 28 2021  6:13PM    < end of copied text >  -------------------------------  < from: MR Head No Cont (08.29.21 @ 14:42) >  PROCEDURE DATE:  08/29/2021        INTERPRETATION:  MRI brain without contrast    History right-sided CVA    Comparison CT performed the prior day    There is a strip of cortical diffusion restriction and mild matching cytotoxic edema extending from anterior to posterior along a watershed distribution in the right hemisphere. There is no mass effect or parenchymal hemorrhage. There is mild central volume loss. White matter signal is relatively preserved. The orbital and sellar contents and cerebellar tonsils are within normal limits.    IMPRESSION:  Small acute uncomplicated right watershed infarct as above    --- End of Report ---    MYLES LONDON MD; Attending Radiologist  This document has been electronically signed. Aug 29 2021  3:33PM    < end of copied text >  --------------------------------    ===============================  ECHO:  8/30/'21 - preliminary review - normal EF no cardioembolic causes identified.    ===============================    Karan Delarosa M.D.  Cardiology, [facility]  Cell: [phone]  Office:   [phone] ([facility] Office)  [phone] ([facility] Office)

## 2021-08-30 NOTE — CONSULT NOTE ADULT - ASSESSMENT
Patient is an 86 male who awoke from a nap and he had L arm weakness and tingeing  In the ED he had a negative HCT and was given TPA in ER.    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  - Statin  -Cardiology eval   - Lab work include lipid / A1c.  - Discussed with Pt, Staff and .    
  86yMale with SVT s/p ablation has an acute RMCA CVA s/p TPA with residual LUE >LLE weakness.   DVT PPX   Rehab -    Recommend ACUTE inpatient rehabilitation for the functional deficits consisting of 3 hours of therapy/day & 24 hour RN/daily PMR physician for comorbid medical management. Patient will be able to tolerate 3 hours a day.    Will continue to follow. Functional progress will determine ongoing rehab dispo recommendations, which may change.    Continue bedside therapy as well as OOB throughout the day with mobilization by staff to maintain cardiopulmonary function and prevention of secondary complications related to debility.     Discussed with rehab team.

## 2021-08-30 NOTE — PROGRESS NOTE ADULT - ASSESSMENT
· Assessment	  Patient is an 86 male who awoke from a nap and he had L arm weakness and tingeing  In the ED he had a negative HCT and was given TPA in ER.    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  - Statin  -Cardiology eval   - Lab work include lipid / A1c.  - Discussed with Pt, Staff and Dr. Lewis.    -Rehab placement when medically stable.
A/P: 86 male who is presenting with a CVA    Plan::  ICU  Q1H neuro checks  Echo  may need a cardio eval un less AFIb is noted on tele  Glycemic control. Lipid profile in AM  MRI today  Statin  A1C normal  Swallow eval  PT eval  Venodynes  Neuro consult
A/P: 86 male who is presenting with a CVA    Plan::  q4h Neuro checks  Echo  Cardio consult  Glycemic control. Lipid profile noted  MRI noted  Statin  A1C normal  Swallow eval noted  PT eval  Venodynes  Neuro consult noted    Transfer to tele  Called into the Hospitalist at 10:45 AM

## 2021-08-30 NOTE — CONSULT NOTE ADULT - PROBLEM SELECTOR RECOMMENDATION 9
Etiology unclear - discussed w/ Dr. Vela. Cardioembolic causes cannot be excluded.  (moderate narrowing of proximal internal carotid artery unlikely to expalin R watershed infarct described).    Discussed w/ pt & family monitoring for afib.  Family prefers 30 day MCOT monitor as an OP  & if this shows no afib, ILR implant.    TTE w/ no signs of cardioembolic causes.  ARSH planned for tues or wed (depending on anesthesia availabilty)  to evaluate for cardioembolic causes.    Cont. ASA.

## 2021-08-31 ENCOUNTER — INPATIENT (INPATIENT)
Facility: HOSPITAL | Age: 86
LOS: 16 days | Discharge: HOME HEALTH | DRG: 949 | End: 2021-09-17
Attending: STUDENT IN AN ORGANIZED HEALTH CARE EDUCATION/TRAINING PROGRAM | Admitting: STUDENT IN AN ORGANIZED HEALTH CARE EDUCATION/TRAINING PROGRAM
Payer: MEDICARE

## 2021-08-31 ENCOUNTER — TRANSCRIPTION ENCOUNTER (OUTPATIENT)
Age: 86
End: 2021-08-31

## 2021-08-31 VITALS
TEMPERATURE: 99 F | SYSTOLIC BLOOD PRESSURE: 125 MMHG | OXYGEN SATURATION: 95 % | RESPIRATION RATE: 18 BRPM | HEART RATE: 90 BPM | DIASTOLIC BLOOD PRESSURE: 68 MMHG

## 2021-08-31 VITALS — TEMPERATURE: 98 F

## 2021-08-31 DIAGNOSIS — H40.9 UNSPECIFIED GLAUCOMA: ICD-10-CM

## 2021-08-31 DIAGNOSIS — R19.7 DIARRHEA, UNSPECIFIED: ICD-10-CM

## 2021-08-31 DIAGNOSIS — H91.8X9 OTHER SPECIFIED HEARING LOSS, UNSPECIFIED EAR: ICD-10-CM

## 2021-08-31 DIAGNOSIS — Z79.02 LONG TERM (CURRENT) USE OF ANTITHROMBOTICS/ANTIPLATELETS: ICD-10-CM

## 2021-08-31 DIAGNOSIS — J02.9 ACUTE PHARYNGITIS, UNSPECIFIED: ICD-10-CM

## 2021-08-31 DIAGNOSIS — I47.1 SUPRAVENTRICULAR TACHYCARDIA: ICD-10-CM

## 2021-08-31 DIAGNOSIS — D72.829 ELEVATED WHITE BLOOD CELL COUNT, UNSPECIFIED: ICD-10-CM

## 2021-08-31 DIAGNOSIS — R74.01 ELEVATION OF LEVELS OF LIVER TRANSAMINASE LEVELS: ICD-10-CM

## 2021-08-31 DIAGNOSIS — R10.13 EPIGASTRIC PAIN: ICD-10-CM

## 2021-08-31 DIAGNOSIS — I69.398 OTHER SEQUELAE OF CEREBRAL INFARCTION: ICD-10-CM

## 2021-08-31 DIAGNOSIS — N40.0 BENIGN PROSTATIC HYPERPLASIA WITHOUT LOWER URINARY TRACT SYMPTOMS: ICD-10-CM

## 2021-08-31 DIAGNOSIS — R09.81 NASAL CONGESTION: ICD-10-CM

## 2021-08-31 DIAGNOSIS — Z87.891 PERSONAL HISTORY OF NICOTINE DEPENDENCE: ICD-10-CM

## 2021-08-31 DIAGNOSIS — I10 ESSENTIAL (PRIMARY) HYPERTENSION: ICD-10-CM

## 2021-08-31 DIAGNOSIS — K21.9 GASTRO-ESOPHAGEAL REFLUX DISEASE WITHOUT ESOPHAGITIS: ICD-10-CM

## 2021-08-31 DIAGNOSIS — E78.5 HYPERLIPIDEMIA, UNSPECIFIED: ICD-10-CM

## 2021-08-31 DIAGNOSIS — I63.9 CEREBRAL INFARCTION, UNSPECIFIED: ICD-10-CM

## 2021-08-31 DIAGNOSIS — Z79.82 LONG TERM (CURRENT) USE OF ASPIRIN: ICD-10-CM

## 2021-08-31 DIAGNOSIS — I69.392 FACIAL WEAKNESS FOLLOWING CEREBRAL INFARCTION: ICD-10-CM

## 2021-08-31 DIAGNOSIS — I63.511 CEREBRAL INFARCTION DUE TO UNSPECIFIED OCCLUSION OR STENOSIS OF RIGHT MIDDLE CEREBRAL ARTERY: ICD-10-CM

## 2021-08-31 DIAGNOSIS — I69.311 MEMORY DEFICIT FOLLOWING CEREBRAL INFARCTION: ICD-10-CM

## 2021-08-31 DIAGNOSIS — K44.9 DIAPHRAGMATIC HERNIA WITHOUT OBSTRUCTION OR GANGRENE: ICD-10-CM

## 2021-08-31 DIAGNOSIS — I69.334 MONOPLEGIA OF UPPER LIMB FOLLOWING CEREBRAL INFARCTION AFFECTING LEFT NON-DOMINANT SIDE: ICD-10-CM

## 2021-08-31 DIAGNOSIS — B37.0 CANDIDAL STOMATITIS: ICD-10-CM

## 2021-08-31 DIAGNOSIS — R26.9 UNSPECIFIED ABNORMALITIES OF GAIT AND MOBILITY: ICD-10-CM

## 2021-08-31 DIAGNOSIS — H53.40 UNSPECIFIED VISUAL FIELD DEFECTS: ICD-10-CM

## 2021-08-31 DIAGNOSIS — R14.0 ABDOMINAL DISTENSION (GASEOUS): ICD-10-CM

## 2021-08-31 DIAGNOSIS — Z51.89 ENCOUNTER FOR OTHER SPECIFIED AFTERCARE: ICD-10-CM

## 2021-08-31 DIAGNOSIS — H53.8 OTHER VISUAL DISTURBANCES: ICD-10-CM

## 2021-08-31 DIAGNOSIS — K59.00 CONSTIPATION, UNSPECIFIED: ICD-10-CM

## 2021-08-31 DIAGNOSIS — G47.33 OBSTRUCTIVE SLEEP APNEA (ADULT) (PEDIATRIC): ICD-10-CM

## 2021-08-31 DIAGNOSIS — N18.2 CHRONIC KIDNEY DISEASE, STAGE 2 (MILD): ICD-10-CM

## 2021-08-31 DIAGNOSIS — I69.322 DYSARTHRIA FOLLOWING CEREBRAL INFARCTION: ICD-10-CM

## 2021-08-31 DIAGNOSIS — Z99.89 DEPENDENCE ON OTHER ENABLING MACHINES AND DEVICES: ICD-10-CM

## 2021-08-31 DIAGNOSIS — R05 COUGH: ICD-10-CM

## 2021-08-31 DIAGNOSIS — H35.30 UNSPECIFIED MACULAR DEGENERATION: ICD-10-CM

## 2021-08-31 LAB
ANION GAP SERPL CALC-SCNC: 3 MMOL/L — LOW (ref 5–17)
BUN SERPL-MCNC: 17 MG/DL — SIGNIFICANT CHANGE UP (ref 7–23)
CALCIUM SERPL-MCNC: 8.4 MG/DL — LOW (ref 8.5–10.1)
CHLORIDE SERPL-SCNC: 107 MMOL/L — SIGNIFICANT CHANGE UP (ref 96–108)
CO2 SERPL-SCNC: 27 MMOL/L — SIGNIFICANT CHANGE UP (ref 22–31)
CREAT SERPL-MCNC: 0.98 MG/DL — SIGNIFICANT CHANGE UP (ref 0.5–1.3)
GLUCOSE SERPL-MCNC: 99 MG/DL — SIGNIFICANT CHANGE UP (ref 70–99)
HCT VFR BLD CALC: 41.3 % — SIGNIFICANT CHANGE UP (ref 39–50)
HGB BLD-MCNC: 14.5 G/DL — SIGNIFICANT CHANGE UP (ref 13–17)
MAGNESIUM SERPL-MCNC: 2.2 MG/DL — SIGNIFICANT CHANGE UP (ref 1.6–2.6)
MCHC RBC-ENTMCNC: 34.1 PG — HIGH (ref 27–34)
MCHC RBC-ENTMCNC: 35.1 GM/DL — SIGNIFICANT CHANGE UP (ref 32–36)
MCV RBC AUTO: 97.2 FL — SIGNIFICANT CHANGE UP (ref 80–100)
PHOSPHATE SERPL-MCNC: 2.7 MG/DL — SIGNIFICANT CHANGE UP (ref 2.5–4.5)
PLATELET # BLD AUTO: 204 K/UL — SIGNIFICANT CHANGE UP (ref 150–400)
POTASSIUM SERPL-MCNC: 3.8 MMOL/L — SIGNIFICANT CHANGE UP (ref 3.5–5.3)
POTASSIUM SERPL-SCNC: 3.8 MMOL/L — SIGNIFICANT CHANGE UP (ref 3.5–5.3)
RBC # BLD: 4.25 M/UL — SIGNIFICANT CHANGE UP (ref 4.2–5.8)
RBC # FLD: 13.2 % — SIGNIFICANT CHANGE UP (ref 10.3–14.5)
SODIUM SERPL-SCNC: 137 MMOL/L — SIGNIFICANT CHANGE UP (ref 135–145)
WBC # BLD: 7.04 K/UL — SIGNIFICANT CHANGE UP (ref 3.8–10.5)
WBC # FLD AUTO: 7.04 K/UL — SIGNIFICANT CHANGE UP (ref 3.8–10.5)

## 2021-08-31 PROCEDURE — 99239 HOSP IP/OBS DSCHRG MGMT >30: CPT

## 2021-08-31 PROCEDURE — 99223 1ST HOSP IP/OBS HIGH 75: CPT

## 2021-08-31 PROCEDURE — 99233 SBSQ HOSP IP/OBS HIGH 50: CPT

## 2021-08-31 RX ORDER — ATORVASTATIN CALCIUM 80 MG/1
1 TABLET, FILM COATED ORAL
Qty: 0 | Refills: 0 | DISCHARGE
Start: 2021-08-31

## 2021-08-31 RX ORDER — LATANOPROST 0.05 MG/ML
1 SOLUTION/ DROPS OPHTHALMIC; TOPICAL AT BEDTIME
Refills: 0 | Status: DISCONTINUED | OUTPATIENT
Start: 2021-08-31 | End: 2021-09-17

## 2021-08-31 RX ORDER — ASPIRIN/CALCIUM CARB/MAGNESIUM 324 MG
1 TABLET ORAL
Qty: 0 | Refills: 0 | DISCHARGE
Start: 2021-08-31

## 2021-08-31 RX ORDER — ASPIRIN/CALCIUM CARB/MAGNESIUM 324 MG
1 TABLET ORAL
Qty: 30 | Refills: 0
Start: 2021-08-31 | End: 2021-09-29

## 2021-08-31 RX ORDER — HEPARIN SODIUM 5000 [USP'U]/ML
5000 INJECTION INTRAVENOUS; SUBCUTANEOUS EVERY 8 HOURS
Refills: 0 | Status: DISCONTINUED | OUTPATIENT
Start: 2021-08-31 | End: 2021-09-06

## 2021-08-31 RX ORDER — LATANOPROST 0.05 MG/ML
1 SOLUTION/ DROPS OPHTHALMIC; TOPICAL
Qty: 0 | Refills: 0 | DISCHARGE
Start: 2021-08-31

## 2021-08-31 RX ORDER — LANOLIN ALCOHOL/MO/W.PET/CERES
6 CREAM (GRAM) TOPICAL AT BEDTIME
Refills: 0 | Status: DISCONTINUED | OUTPATIENT
Start: 2021-08-31 | End: 2021-09-17

## 2021-08-31 RX ORDER — ATORVASTATIN CALCIUM 80 MG/1
80 TABLET, FILM COATED ORAL AT BEDTIME
Refills: 0 | Status: DISCONTINUED | OUTPATIENT
Start: 2021-08-31 | End: 2021-09-17

## 2021-08-31 RX ORDER — CLOPIDOGREL BISULFATE 75 MG/1
75 TABLET, FILM COATED ORAL DAILY
Refills: 0 | Status: DISCONTINUED | OUTPATIENT
Start: 2021-08-31 | End: 2021-09-17

## 2021-08-31 RX ORDER — POLYETHYLENE GLYCOL 3350 17 G/17G
17 POWDER, FOR SOLUTION ORAL DAILY
Refills: 0 | Status: DISCONTINUED | OUTPATIENT
Start: 2021-09-01 | End: 2021-09-12

## 2021-08-31 RX ORDER — ASPIRIN/CALCIUM CARB/MAGNESIUM 324 MG
81 TABLET ORAL DAILY
Refills: 0 | Status: DISCONTINUED | OUTPATIENT
Start: 2021-09-01 | End: 2021-09-17

## 2021-08-31 RX ORDER — SENNA PLUS 8.6 MG/1
2 TABLET ORAL AT BEDTIME
Refills: 0 | Status: DISCONTINUED | OUTPATIENT
Start: 2021-08-31 | End: 2021-09-12

## 2021-08-31 RX ORDER — CLOPIDOGREL BISULFATE 75 MG/1
1 TABLET, FILM COATED ORAL
Qty: 30 | Refills: 0
Start: 2021-08-31 | End: 2021-09-29

## 2021-08-31 RX ADMIN — HEPARIN SODIUM 5000 UNIT(S): 5000 INJECTION INTRAVENOUS; SUBCUTANEOUS at 15:03

## 2021-08-31 RX ADMIN — Medication 325 MILLIGRAM(S): at 10:55

## 2021-08-31 RX ADMIN — HEPARIN SODIUM 5000 UNIT(S): 5000 INJECTION INTRAVENOUS; SUBCUTANEOUS at 22:25

## 2021-08-31 RX ADMIN — Medication 10 MILLIGRAM(S): at 22:25

## 2021-08-31 RX ADMIN — SENNA PLUS 2 TABLET(S): 8.6 TABLET ORAL at 22:25

## 2021-08-31 RX ADMIN — ATORVASTATIN CALCIUM 80 MILLIGRAM(S): 80 TABLET, FILM COATED ORAL at 22:25

## 2021-08-31 RX ADMIN — LATANOPROST 1 DROP(S): 0.05 SOLUTION/ DROPS OPHTHALMIC; TOPICAL at 22:25

## 2021-08-31 RX ADMIN — HEPARIN SODIUM 5000 UNIT(S): 5000 INJECTION INTRAVENOUS; SUBCUTANEOUS at 06:35

## 2021-08-31 NOTE — DISCHARGE NOTE PROVIDER - PROVIDER TOKENS
PROVIDER:[TOKEN:[[id number]],FOLLOWUP:[1 month]],PROVIDER:[TOKEN:[[id number]]],FREE:[LAST:[Joshua],FIRST:[Naun],PHONE:[(   )    -],FAX:[(   )    -]]

## 2021-08-31 NOTE — PACU DISCHARGE NOTE - COMMENTS
Status post TTE.  ARSH aborted, unable to pass the probe through oral cavity.  bubble study done.  Tolerated procedure well.  Report given to primary RN.  Transported back to room via stretcher/cardiac monitor/RN.

## 2021-08-31 NOTE — H&P ADULT - HISTORY OF PRESENT ILLNESS
Patient is a 86 Year old male with a PMHx of HLD, BPH, History of Supraventricular Tachycardia s/p ablation & Osteoarthritis who presented to  ED on 8/28 who woke up from a nap and had L arm weakness and paraesthesia In the ED he had a negative Head CT and was given TPA. MRI of the head was significant for small uncomplicated right MCA watershed infarct. CT angio of brain showed proximal internal carotid artery with at least moderate narrowing but no occlusion. Post TPA Patient continued to have LUE > LLE weakness. Patient was seen by cardiology who were unable to preform TTE due to probe not being passed successfully. TTE bubble study was negative for any PFO. Also recommended Ecotrin to 81 mg po daily, Plavix 75 mg po daily & Statin. Pt recommended for acute inpatient rehabilitation and was transferred to [facility] on 8/31/2021.           Patient is a 86 Year old male with a PMHx of HLD, BPH, History of Supraventricular Tachycardia s/p ablation & Osteoarthritis, PREET on home cpap who presented to  ED on 8/28 who woke up from a nap and had L arm weakness and paraesthesia In the ED he had a negative Head CT and was given TPA. MRI of the head was significant for small uncomplicated right MCA watershed infarct. CT angio of brain showed proximal internal carotid artery with at least moderate narrowing but no occlusion. Post TPA Patient continued to have LUE > LLE weakness. Patient was seen by cardiology who were unable to preform TTE due to probe not being passed successfully. TTE bubble study was negative for any PFO. Also recommended Ecotrin to 81 mg po daily, Plavix 75 mg po daily & Statin. Pt recommended for acute inpatient rehabilitation and was transferred to [facility] on 8/31/2021.            Patient is a 86 Year old male with a PMHx of HLD, BPH, History of Supraventricular Tachycardia s/p ablation & Osteoarthritis, PREET on home cpap who presented to  ED on 8/28 who woke up from a nap and had L arm weakness and paraesthesia In the ED he had a negative Head CT and was given TPA. MRI of the head was significant for small uncomplicated right MCA watershed infarct. CT angio of brain showed proximal internal carotid artery with at least moderate narrowing but no occlusion. Post TPA Patient continued to have LUE > LLE weakness.   Patient was seen by cardiology who were unable to preform TTE due to probe not being passed successfully. TTE bubble study was negative for any PFO. Also recommended Ecotrin to 81 mg po daily, Plavix 75 mg po daily & Statin. Pt recommended for acute inpatient rehabilitation and was transferred to [facility] on 8/31/2021.

## 2021-08-31 NOTE — DISCHARGE NOTE PROVIDER - HOSPITAL COURSE
86 male who awoke from a nap and he had L arm weakness and tingeing  In the ED he had a negative HCT and was given TPA.  He denies palpitations  He did have an ablation several year ago but denies a Hx of AFIB (28 Aug 2021 18:40)  MRI head showed "small uncomplicated right MCA  watershed infarct."    CT angio of brain showed proximal internal carotid artery w/ "at least moderate narrowing" but no occlusion.    Pt denies any cardiac symptoms prior to CVA event:  no palpitaitons, chest discomfort, dyspnea, lightheadness, syncope.    Still has neuro deficits.  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  He denies palpitations  He did have an ablation several year ago but denies a Hx of AFIB (28 Aug 2021 18:40)  MRI head showed "small uncomplicated right MCA  watershed infarct."    CT angio of brain showed proximal internal carotid artery w/ "at least moderate narrowing" but no occlusion.    Pt denies any cardiac symptoms prior to CVA event:  no palpitaitons, chest discomfort, dyspnea, lightheadness, syncope.    Still has neuro deficits.  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

## 2021-08-31 NOTE — DISCHARGE NOTE PROVIDER - CARE PROVIDERS DIRECT ADDRESSES
,[email or web address]_Unknown

## 2021-08-31 NOTE — DISCHARGE NOTE PROVIDER - NSDCMRMEDTOKEN_GEN_ALL_CORE_FT
aspirin 325 mg oral delayed release tablet: 1 tab(s) orally once a day  atorvastatin 80 mg oral tablet: 1 tab(s) orally once a day (at bedtime)  latanoprost 0.005% ophthalmic solution: 1 drop(s) to each affected eye once a day (at bedtime)   atorvastatin 80 mg oral tablet: 1 tab(s) orally once a day (at bedtime)  latanoprost 0.005% ophthalmic solution: 1 drop(s) to each affected eye once a day (at bedtime)

## 2021-08-31 NOTE — PROGRESS NOTE ADULT - PROBLEM SELECTOR PLAN 1
Etiology unclear  Cardioembolic causes cannot be excluded.  (moderate narrowing of proximal internal carotid artery unlikely to expalin R watershed infarct described).  Discussed w/ pt & family monitoring for afib.  Family prefers 30 day MCOT monitor as an OP  & if this shows no afib, ILR implant.    TTE w/ no signs of cardioembolic causes.  ARSH planned for today   to evaluate for cardioembolic causes.   explained to patient and    his daughter over the phone ,   understand risk of pneumonia , sore throat , esophageal rupture       Cont. ASA.

## 2021-08-31 NOTE — H&P ADULT - NSHPREVIEWOFSYSTEMS_GEN_ALL_CORE
REVIEW OF SYSTEMS  Constitutional: No fever, No Chills, No fatigue  HEENT: No eye pain, No visual disturbances, No difficulty hearing  Pulm: No cough,  No shortness of breath  Cardio: No chest pain, No palpitations  GI:  No abdominal pain, No nausea, No vomiting, No diarrhea, No constipation  : No dysuria, No frequency, No hematuria  Neuro: No headaches, No memory loss, No loss of strength, No numbness, No tremors  Skin: No itching, No rashes, No lesions   Endo: No temperature intolerance  MSK: No joint pain, No joint swelling, No muscle pain, No Neck or back pain  Psych:  No depression, No anxiety REVIEW OF SYSTEMS  Constitutional: No fever, No Chills, No fatigue  HEENT: No eye pain, + left visual field deficits, + difficulty hearing (b/l hearing aids)  Pulm: No cough,  No shortness of breath  Cardio: No chest pain, No palpitations  GI:  No abdominal pain, No nausea, No vomiting, No diarrhea, + constipation, +last BM ?10 days ago  : No dysuria, No frequency, No hematuria  Neuro: No headaches, No memory loss, + LUE loss of strength, + numbness and tingling in LUE, No tremors  Skin: No itching, No rashes, No lesions   Endo: No temperature intolerance  MSK: No joint pain, No joint swelling, No muscle pain, No Neck or back pain  Psych:  No depression, No anxiety REVIEW OF SYSTEMS  Constitutional: No fever, No Chills, No fatigue  HEENT: No eye pain, + left visual field deficits, + difficulty hearing (b/l hearing aids)  Pulm: No cough,  No shortness of breath  Cardio: No chest pain, No palpitations  GI:  No abdominal pain, No nausea, No vomiting, No diarrhea, + constipation, +last BM ?10 days ago  : No dysuria, No frequency, No hematuria  Neuro: No headaches, mild memory loss, + LUE loss of strength, + numbness and tingling in LUE, No tremors  Skin: No itching, No rashes, No lesions   Endo: No temperature intolerance  MSK: No joint pain, No joint swelling, No muscle pain, No Neck or back pain  Psych:  No depression, No anxiety

## 2021-08-31 NOTE — H&P ADULT - ASSESSMENT
Patient is a 86 Year old male with a PMHx of HLD, BPH, History of Supraventricular Tachycardia s/p ablation & Osteoarthritis who presented to  ED on 8/28 who woke up from a nap and had L arm weakness and paraesthesia In the ED he had a negative Head CT and was given TPA. MRI of the head was significant for small uncomplicated right MCA watershed infarct.  Post TPA Patient continued to have LUE > LLE weakness.     #R MCA infarct  - Started on DAPT (Plavix+ Aspirin) & 80mg Atorvastatin as per Cardiology and Neurology.  - Start comprehensive rehab program, PT/OT/SLP 3 hours a day, 5 days a week  - Precautions: cardiac, Aspiration, falls    #HLD   - Continue 80mg Atorvastatin.     #Hypertension   - Monitor BP. al BP<130/80    #GI  - Dulcolax PRN, Senna    #  - bladder scan on admission and straight cath as necessary    #Diet  - Regular - Carb controlled - DASH/ TLC diet.     #DVT prophylaxis.   - Continue Heparin     # Sleep:  - Melatonin qHS    # Skin/Pressure Injury:  - Skin assessment on admission admission: ***  - Monitor Incisions: ***  - Turn every 2 hours while in bed    ---------------  Outpatient Follow-up:    Palla, Venugopal R (MD)  Cardiovascular Disease; Internal Medicine  [address], [place] [id number]  Phone: [phone]  Fax: [phone]  Follow Up Time: 1 month    Elis Vela  NEUROLOGY - [address], [address]  [place] [address]  Phone: [phone]  Fax: [phone]  Follow Up Time:     Naun Lewis - PCP    --------------    Goals: Safe discharge to home  Estimated Length of Stay: 10-14 days  Rehab Potential: Good  Medical Prognosis: Good  Estimated Disposition: Home with Home Care  ---------------      MEDICAL PROGNOSIS: GOOD                                   REHAB POTENTIAL: GOOD  ESTIMATED DISPOSITION: HOME                             ELOS: 14 Days   EXPECTED THERAPY:     P.T. 1 hr/day       O.T. 1 hr/day      S.L.P. 1 hr/day      EXP FREQUENCY: 5 days per 7 day period     PRESCREEN COMPARISON I have reviewed the prescreen information and I have found no relevant changes between the preadmission screening and my post admission evaluation     RATIONALE FOR INPATIENT ADMISSION - Patient demonstrates the following: (check all that apply)  [X] Medically appropriate for rehabilitation admission  [X] Has attainable rehab goals with an appropriate initial discharge plan  [X] Has rehabilitation potential (expected to make a significant improvement within a reasonable period of time)   [X] Requires close medical management by a rehab physician, rehab nursing care, Hospitalist and comprehensive interdisciplinary team (including PT, OT, & or SLP, Prosthetics and Orthotics)   Patient is a 86 Year old male with a PMHx of HLD, BPH, History of Supraventricular Tachycardia s/p ablation & Osteoarthritis who presented to  ED on 8/28 who woke up from a nap and had L arm weakness and paraesthesia In the ED he had a negative Head CT and was given TPA. MRI of the head was significant for small uncomplicated right MCA watershed infarct.  Post TPA Patient continued to have LUE > LLE weakness.     #R MCA infarct  - Started on DAPT (Plavix+ Aspirin) & 80mg Atorvastatin as per Cardiology and Neurology.  - Start comprehensive rehab program, PT/OT/SLP 3 hours a day, 5 days a week  - Precautions: cardiac, Aspiration, falls    #HLD   - Continue 80mg Atorvastatin.     #Hypertension   - Monitor BP. al BP<130/80    #PREET  - pt's daughter to bring in home CPAP    #GI  - Dulcolax supp prn, Miralax daily, senna 2 tabs at qhs    #  - bladder scan on admission and straight cath as necessary    #Diet  - Regular - Carb controlled - DASH/ TLC diet.     #DVT prophylaxis.   - Continue Heparin     # Sleep:  - Melatonin qHS prn    # Skin/Pressure Injury:  - Skin assessment on admission - no pressure ulcers  - Turn every 2 hours while in bed    ---------------  Outpatient Follow-up:    Palla, Venugopal R (MD)  Cardiovascular Disease; Internal Medicine  [address], [place] [id number]  Phone: [phone]  Fax: [phone]  Follow Up Time: 1 month    Elis Vela  NEUROLOGY - GENERAL  [address] [place] [address], [address]  [place] [address]  Phone: [phone]  Fax: [phone]  Follow Up Time:     Naun Lewis - PCP    --------------    Goals: Safe discharge to home  Estimated Length of Stay: 10-14 days  Rehab Potential: Good  Medical Prognosis: Good  Estimated Disposition: Home with Home Care  ---------------      MEDICAL PROGNOSIS: GOOD                                   REHAB POTENTIAL: GOOD  ESTIMATED DISPOSITION: HOME                             ELOS: 14 Days   EXPECTED THERAPY:     P.T. 1 hr/day       O.T. 1 hr/day      S.L.P. 1 hr/day      EXP FREQUENCY: 5 days per 7 day period     PRESCREEN COMPARISON I have reviewed the prescreen information and I have found no relevant changes between the preadmission screening and my post admission evaluation     RATIONALE FOR INPATIENT ADMISSION - Patient demonstrates the following: (check all that apply)  [X] Medically appropriate for rehabilitation admission  [X] Has attainable rehab goals with an appropriate initial discharge plan  [X] Has rehabilitation potential (expected to make a significant improvement within a reasonable period of time)   [X] Requires close medical management by a rehab physician, rehab nursing care, Hospitalist and comprehensive interdisciplinary team (including PT, OT, & or SLP, Prosthetics and Orthotics)   Patient is a 86 Year old male with a PMHx of HLD, BPH, History of Supraventricular Tachycardia s/p ablation & Osteoarthritis who presented to  ED on 8/28 with L arm weakness and paraesthesia. + TPA. MRI shows right MCA watershed infarct. Admitted  to acute rehabilitation with left UE monoparesis, sensory impairment, dysarthria, mild cognitive deficits, gait and ADL impairments.      #R MCA infarct  - Started on DAPT (Plavix+ Aspirin) & 80mg Atorvastatin as per Cardiology and Neurology.  - Start comprehensive rehab program, PT/OT/SLP 3 hours a day, 5 days a week  - Precautions: cardiac, Aspiration, falls    #HLD   - Continue 80mg Atorvastatin.     #Hypertension   - Monitor BP. al BP<130/80    #PREET  - pt's daughter to bring in home CPAP    #GI  - Dulcolax supp prn, Miralax daily, senna 2 tabs at qhs    #  - bladder scan on admission and straight cath as necessary    #Diet  - Regular - Carb controlled - DASH/ TLC diet.     #DVT prophylaxis.   - Continue Heparin     # Sleep:  - Melatonin qHS prn    # Skin/Pressure Injury:  - Skin assessment on admission - no pressure ulcers  - Turn every 2 hours while in bed    ---------------  Outpatient Follow-up:    Palla, Venugopal R (MD)  Cardiovascular Disease; Internal Medicine  [address], [place] [id number]  Phone: [phone]  Fax: [phone]  Follow Up Time: 1 month    Elis Vela  NEUROLOGY - GENERAL  [address] [place] [address], [address]  [place] [address]  Phone: [phone]  Fax: [phone]  Follow Up Time:     Naun Lewis - PCP    --------------    Goals: Safe discharge to home  Estimated Length of Stay: 10-14 days  Rehab Potential: Good  Medical Prognosis: Good  Estimated Disposition: Home with Home Care  ---------------      MEDICAL PROGNOSIS: GOOD                                   REHAB POTENTIAL: GOOD  ESTIMATED DISPOSITION: HOME                             ELOS: 14 Days   EXPECTED THERAPY:     P.T. 1 hr/day       O.T. 1 hr/day      S.L.P. 1 hr/day      EXP FREQUENCY: 5 days per 7 day period     PRESCREEN COMPARISON I have reviewed the prescreen information and I have found no relevant changes between the preadmission screening and my post admission evaluation     RATIONALE FOR INPATIENT ADMISSION - Patient demonstrates the following: (check all that apply)  [X] Medically appropriate for rehabilitation admission  [X] Has attainable rehab goals with an appropriate initial discharge plan  [X] Has rehabilitation potential (expected to make a significant improvement within a reasonable period of time)   [X] Requires close medical management by a rehab physician, rehab nursing care, Hospitalist and comprehensive interdisciplinary team (including PT, OT, & or SLP, Prosthetics and Orthotics)

## 2021-08-31 NOTE — PROGRESS NOTE ADULT - SUBJECTIVE AND OBJECTIVE BOX
· Reason for Admission	CVA  8/30/21 : Pt Offers no new c/o. Persistent left side weakness with Arm > LE . MRI completed.     MEDICATIONS  (STANDING):  aspirin enteric coated 325 milliGRAM(s) Oral daily  atorvastatin 80 milliGRAM(s) Oral at bedtime  heparin   Injectable 5000 Unit(s) SubCutaneous every 8 hours      Vital Signs Last 24 Hrs  T(C): 36.8 (30 Aug 2021 06:53), Max: 37 (29 Aug 2021 21:27)  T(F): 98.3 (30 Aug 2021 06:53), Max: 98.6 (29 Aug 2021 21:27)  HR: 76 (30 Aug 2021 09:00) (55 - 77)  BP: 124/76 (30 Aug 2021 09:00) (97/60 - 132/74)  BP(mean): 87 (30 Aug 2021 09:00) (65 - 124)  RR: 14 (30 Aug 2021 09:00) (10 - 17)  SpO2: 97% (30 Aug 2021 07:00) (92% - 98%)    Neurological exam:  HF: A x O x 3. Appropriately interactive, normal affect. Speech Dysarthric   CN: MICHELLE, EOMI, Left VF neglect, Vf defect  facial sensation  decreased., left facial weakness Huslia , Gag   not be tested.  Motor: left UE weakness 2/5  LLE 4/5 Rt side 5/5     Sens: Intact to light touch    Reflexes:  slightly brisk Left > Rt Plantars Down Rt Up going on Left  Coord:  Not able to do on left   Gait/Balance: Cannot test    NIHSS: 8                        13.9   8.22  )-----------( 191      ( 30 Aug 2021 06:25 )             38.8     08-30    139  |  109<H>  |  16  ----------------------------<  98  4.1   |  26  |  0.90    Ca    8.5      30 Aug 2021 06:25  Phos  2.6     08-30  Mg     2.1     08-30    TPro  6.9  /  Alb  3.5  /  TBili  0.3  /  DBili  x   /  AST  16  /  ALT  25  /  AlkPhos  64  08-28      08-29 Chol 124 LDL -- HDL 35<L> Trig 78    Radiology report:  - CT Head  < from: CT Brain Stroke Protocol (08.28.21 @ 17:25) >  IMPRESSION:      1. The perfusion study is abnormal.  2. Focal calcified plaque in the proximal internal carotid artery on the right with at least moderate narrowing but no occlusion.  This may account for hypoperfusion of 254 mL at theTmax> 4 seconds. There is 20 mL hypoperfusion in the distal right MCA territory at the 6 second threshold  3. The other head and neck vessels are patent. No intraluminal thrombus noted intracranially.  4. A noncontrast CT study shows no evidence of hemorrhage, with generalized volume loss and involutional change. 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    Follow-up MR imaging of the brain recommended for further assessment.    - MRI brain  < from: MR Head No Cont (08.29.21 @ 14:42) >  IMPRESSION:  Small acute uncomplicated right watershed infarct as above      
87 y/o     *s/p ablation for arrhythmia (SVT?)  *HLP  *no other prior cardiac history.    admitted for stroke.  Awoke on day of admit from nap & had L arm weakness & tingling.  In ED CT scan neg & was given TPA.      Patient is an 86 male who awoke from a nap and he had L arm weakness and tingeing  In the ED he had a negative HCT and was given TPA.  He denies palpitations  He did have an ablation several year ago but denies a Hx of AFIB (28 Aug 2021 18:40)  MRI head showed "small uncomplicated right MCA  watershed infarct."    CT angio of brain showed proximal internal carotid artery w/ "at least moderate narrowing" but no occlusion.    Pt denies any cardiac symptoms prior to CVA event:  no palpitaitons, chest discomfort, dyspnea, lightheadness, syncope.    Still has neuro deficits.  21   patient  still LUE weakness , denies any other symptoms , denies any chest pain , scheduled to for procedure , spoke to patient , agreed , called his daughter also     PAST MEDICAL AND SURGICAL HISTORY:  GERD (Gastroesophageal Reflux Disease)  Hyperlipidemia  Benign Prostatic Hypertrophy  Diverticulosis  Osteoarthritis  Osteoporosis  History of Supraventricular Tachycardia  s/p ablation  History of Cholecystectomy  Historyof Prior Ablation Treatment  S/P Cataract Surgery  left eye    ALLERGIES:  Allergies  sulfa drugs (Unknown)  Intolerances      SOCIAL HISTORY:  neg x 3      FAMILY  HISTORY:  noncontributory given age.    MEDICATIONS:  OUTPATIENT:  Home Medications:      MEDICATIONS  (STANDING):  aspirin enteric coated 325 milliGRAM(s) Oral daily  atorvastatin 80 milliGRAM(s) Oral at bedtime  heparin   Injectable 5000 Unit(s) SubCutaneous every 8 hours  latanoprost 0.005% Ophthalmic Solution 1 Drop(s) Both EYES at bedtime    MEDICATIONS  (PRN):    REVIEW OF SYSTEMS:    CONSTITUTIONAL: No weakness, fevers or chills  EYES/ENT: No visual changes;  No vertigo or throat pain   NECK: No pain or stiffness  RESPIRATORY: No cough, wheezing, hemoptysis; No shortness of breath  CARDIOVASCULAR: No chest pain or palpitations  GASTROINTESTINAL: No abdominal or epigastric pain. No nausea, vomiting, or hematemesis; No diarrhea or constipation. No melena or hematochezia.  GENITOURINARY: No dysuria, frequency or hematuria  NEUROLOGICAL: LUE weakness   SKIN: No itching, burning, rashes, or lesions   All other review of systems is negative unless indicated above    Vital Signs Last 24 Hrs  T(C): 36.9 (31 Aug 2021 05:03), Max: 37 (30 Aug 2021 09:18)  T(F): 98.5 (31 Aug 2021 05:03), Max: 98.6 (30 Aug 2021 09:18)  HR: 65 (31 Aug 2021 07:00) (59 - 83)  BP: 125/77 (31 Aug 2021 07:00) (93/55 - 132/65)  BP(mean): 90 (31 Aug 2021 07:00) (63 - 93)  RR: 12 (31 Aug 2021 07:00) (9 - 20)  SpO2: 96% (31 Aug 2021 07:00) (93% - 97%)    I&O's Summary    30 Aug 2021 07:01  -  31 Aug 2021 07:00  --------------------------------------------------------  IN: 0 mL / OUT: 1000 mL / NET: -1000 mL        PHYSICAL EXAM:    Constitutional: NAD, awake and alert, well-developed  HEENT: PERR, EOMI,  No oral cyananosis.  Neck:  supple,  No JVD  Respiratory: Breath sounds are clear bilaterally, No wheezing, rales or rhonchi  Cardiovascular: S1 and S2, regular rate and rhythm, no Murmurs, gallops or rubs  Gastrointestinal: Bowel Sounds present, soft, nontender.   Extremities: No peripheral edema. No clubbing or cyanosis.  Vascular: 2+ peripheral pulses  Neurological: A/O x 3, LUE weakness   Musculoskeletal: no calf tenderness.  Skin: No rashes.      LABS: All Labs Reviewed:                              14.5   7.04  )-----------( 204      ( 31 Aug 2021 07:02 )             41.3     08-30    139  |  109<H>  |  16  ----------------------------<  98  4.1   |  26  |  0.90    Ca    8.5      30 Aug 2021 06:25  Phos  2.6     08-30  Mg     2.1     08-30              Urinalysis Basic - ( 29 Aug 2021 20:37 )    Color: Yellow / Appearance: Clear / S.010 / pH: x  Gluc: x / Ketone: Negative  / Bili: Negative / Urobili: Negative mg/dL   Blood: x / Protein: Negative mg/dL / Nitrite: Negative   Leuk Esterase: Negative / RBC: x / WBC x   Sq Epi: x / Non Sq Epi: x / Bacteria: x       Chol 124 LDL -- HDL 35<L> Trig 78    ===============================  EKG: NSR, no ischemic changes      TELE: NSR  ===============================  RADIOLOGY:  --------------------------------  C< from: CT Brain Stroke Protocol (21 @ 17:25) >  IMPRESSION:      1. The perfusion study is abnormal.  2. Focal calcified plaque in the proximal internal carotid artery on the right with at least moderate narrowing but no occlusion.  This may account for hypoperfusion of 254 mL at theTmax> 4 seconds. There is 20 mL hypoperfusion in the distal right MCA territory at the 6 second threshold  3. The other head and neck vessels are patent. No intraluminal thrombus noted intracranially.  4. A noncontrast CT study shows no evidence of hemorrhage, with generalized volume loss and involutional change. 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    Follow-up MR imaging of the brain recommended for further assessment.    Physician: ALEA KEN [id number] was notified at 6:00 PM.    --- End of Report ---            IVONNE SALDANA MD; Attending Radiologist  This document has been electronically signed. Aug 28 2021  6:13PM    < end of copied text >  -------------------------------  < from: MR Head No Cont (21 @ 14:42) >  PROCEDURE DATE:  2021        INTERPRETATION:  MRI brain without contrast    History right-sided CVA    Comparison CT performed the prior day    There is a strip of cortical diffusion restriction and mild matching cytotoxic edema extending from anterior to posterior along a watershed distribution in the right hemisphere. There is no mass effect or parenchymal hemorrhage. There is mild central volume loss. White matter signal is relatively preserved. The orbital and sellar contents and cerebellar tonsils are within normal limits.    IMPRESSION:  Small acute uncomplicated right watershed infarct as above    --- End of Report ---    MYLES LONDON MD; Attending Radiologist  This document has been electronically signed. Aug 29 2021  3:33PM    < end of copied text >  --------------------------------    ===============================  e< from: TTE Echo Complete w/o Contrast w/ Doppler (21 @ 11:14) >     Fibrocalcific changes noted to the mitral valve leaflets with preserved   leaflet excursion.   EA reversal of the mitral inflow consistent with reduced compliance of the   left ventricle.   Mild mitral annular calcification is present.   Trace mitral regurgitation is present.   There are fibrocalcific changes noted to the aortic valve leaflets with   restriction in leaflet excursion. Transaortic gradients are elevated but   do not meet the criteria for aortic stenosis.   The aortic valve is not well visualized.   Normal appearing tricuspid valve structure.   Mild (1+) tricuspid valve regurgitation is present.   Normal appearing pulmonic valve structure.   Trace pulmonic valvular regurgitation is present.   Normal appearing left atrium.   The left ventricle is normal in size, wall motion and contractility.   Mild concentric left ventricular hypertrophy is present.   Estimated left ventricular ejection fraction is 50-55 %.   The right atrium appears mildly dilated.   The right ventricle is mildly dilated.     Signature      < end of copied text >      Monitor sinus rhythm     
Patient is an 86 male who awoke from a nap and he had L arm weakness and tingeing  In the ED he had a negative HCT and was given TPA.  He denies palpitations  He did have an ablation several year ago but denies a Hx of AFIB    : he continues with a L Facial, LUE numbness and weakness  : he continues with L sided weakness    PAST MEDICAL & SURGICAL HISTORY:  GERD (Gastroesophageal Reflux Disease)    Hyperlipidemia    Benign Prostatic Hypertrophy    Diverticulosis    Osteoarthritis    Osteoporosis    History of Supraventricular Tachycardia  s/p ablation    History of Cholecystectomy    History of Prior Ablation Treatment    S/P Cataract Surgery  left eye        FAMILY HISTORY:      Social Hx:    Allergies    sulfa drugs (Unknown)    Intolerances            ICU Vital Signs Last 24 Hrs  T(C): 36.8 (30 Aug 2021 06:53), Max: 37 (29 Aug 2021 21:27)  T(F): 98.3 (30 Aug 2021 06:53), Max: 98.6 (29 Aug 2021 21:27)  HR: 76 (30 Aug 2021 09:00) (55 - 77)  BP: 124/76 (30 Aug 2021 09:00) (97/60 - 132/74)  BP(mean): 87 (30 Aug 2021 09:00) (65 - 124)  ABP: --  ABP(mean): --  RR: 14 (30 Aug 2021 09:00) (10 - 17)  SpO2: 97% (30 Aug 2021 07:00) (92% - 98%)          I&O's Summary    29 Aug 2021 07:01  -  30 Aug 2021 07:00  --------------------------------------------------------  IN: 0 mL / OUT: 1300 mL / NET: -1300 mL                              13.9   8.22  )-----------( 191      ( 30 Aug 2021 06:25 )             38.8           139  |  109<H>  |  16  ----------------------------<  98  4.1   |  26  |  0.90    Ca    8.5      30 Aug 2021 06:25  Phos  2.6     08-  Mg     2.1     -30    TPro  6.9  /  Alb  3.5  /  TBili  0.3  /  DBili  x   /  AST  16  /  ALT  25  /  AlkPhos  64  -      CARDIAC MARKERS ( 28 Aug 2021 17:30 )  <0.015 ng/mL / x     / x     / x     / x                Urinalysis Basic - ( 29 Aug 2021 20:37 )    Color: Yellow / Appearance: Clear / S.010 / pH: x  Gluc: x / Ketone: Negative  / Bili: Negative / Urobili: Negative mg/dL   Blood: x / Protein: Negative mg/dL / Nitrite: Negative   Leuk Esterase: Negative / RBC: x / WBC x   Sq Epi: x / Non Sq Epi: x / Bacteria: x        MEDICATIONS  (STANDING):  aspirin enteric coated 325 milliGRAM(s) Oral daily  atorvastatin 80 milliGRAM(s) Oral at bedtime  heparin   Injectable 5000 Unit(s) SubCutaneous every 8 hours    MEDICATIONS  (PRN):      DVT Prophylaxis: SQH    Advanced Directives:  Discussed with:    Visit Information:    ** Time is exclusive of billed procedures and/or teaching and/or routine family updates.        
Patient is an 86 male who awoke from a nap and he had L arm weakness and tingeing  In the ED he had a negative HCT and was given TPA.  He denies palpitations  He did have an ablation several year ago but denies a Hx of AFIB    8/29: he continues with a L Facial, LUE numbness and weakness        PAST MEDICAL & SURGICAL HISTORY:  GERD (Gastroesophageal Reflux Disease)    Hyperlipidemia    Benign Prostatic Hypertrophy    Diverticulosis    Osteoarthritis    Osteoporosis    History of Supraventricular Tachycardia  s/p ablation    History of Cholecystectomy    History of Prior Ablation Treatment    S/P Cataract Surgery  left eye        FAMILY HISTORY:      Social Hx:    Allergies    sulfa drugs (Unknown)    Intolerances        Height (cm): 167.6 (08-28 @ 17:19)  Weight (kg): 73.5 (08-28 @ 17:56)  BMI (kg/m2): 26.2 (08-28 @ 17:56)    ICU Vital Signs Last 24 Hrs  T(C): 36.7 (29 Aug 2021 09:00), Max: 37 (28 Aug 2021 17:19)  T(F): 98 (29 Aug 2021 09:00), Max: 98.6 (28 Aug 2021 17:19)  HR: 65 (29 Aug 2021 11:00) (54 - 78)  BP: 132/66 (29 Aug 2021 11:00) (97/59 - 157/78)  BP(mean): 86 (29 Aug 2021 11:00) (65 - 125)  ABP: --  ABP(mean): --  RR: 14 (29 Aug 2021 11:00) (12 - 21)  SpO2: 96% (29 Aug 2021 11:00) (94% - 100%)          I&O's Summary    28 Aug 2021 07:01  -  29 Aug 2021 07:00  --------------------------------------------------------  IN: 0 mL / OUT: 650 mL / NET: -650 mL                              13.4   8.99  )-----------( 205      ( 29 Aug 2021 06:17 )             39.1       08-29    140  |  108  |  14  ----------------------------<  88  4.0   |  26  |  0.98    Ca    8.5      29 Aug 2021 06:17  Phos  2.9     08-29  Mg     2.0     08-29    TPro  6.9  /  Alb  3.5  /  TBili  0.3  /  DBili  x   /  AST  16  /  ALT  25  /  AlkPhos  64  08-28      CARDIAC MARKERS ( 28 Aug 2021 17:30 )  <0.015 ng/mL / x     / x     / x     / x                    MEDICATIONS  (STANDING):  atorvastatin 80 milliGRAM(s) Oral at bedtime    MEDICATIONS  (PRN):      DVT Prophylaxis:    Advanced Directives:  Discussed with:    Visit Information: 30 min    ** Time is exclusive of billed procedures and/or teaching and/or routine family updates.

## 2021-08-31 NOTE — H&P ADULT - NSHPPHYSICALEXAM_GEN_ALL_CORE
Constitutional - NAD, Comfortable  HEENT - NCAT, EOMI  Neck - Supple, No limited ROM  Chest - good chest expansion, good respiratory effort, CTAB   Cardio - warm and well perfused, RRR, no murmur  Abdomen -  Soft, NTND  Extremities - No peripheral edema, No calf tenderness   Neurologic Exam:                    Cognitive -             Orientation: Awake, Alert, AAO to self, place, date, year, situation            Attention:  Days of week, recall 3 objects without cuing            Memory: Recent/ remote memory intact            Thought: process, content appropriate     Speech - Fluent, Comprehensible, No dysarthria, No aphasia      Cranial Nerves - No facial asymmetry, Tongue midline, EOMI, Shoulder shrug intact     Motor -                      LEFT    UE - ShAB 5/5, EF 5/5, EE 5/5, WE 5/5,  WNL                    RIGHT UE - ShAB 5/5, EF 5/5, EE 5/5, WE 5/5,  WNL                    LEFT    LE - HF 5/5, KE 5/5, DF 5/5, PF 5/5                    RIGHT LE - HF 5/5, KE 5/5, DF 5/5, PF 5/5        Sensory - Intact to LT bilateral     Reflexes - DTR _ / 4 , neg Suazo's b/l, neg Babinski's b/l     Coordination - FTN / HTS intact     OculoVestibular -  No nystagmus  Psychiatric - Mood stable, Affect WNL  Skin on admission: Constitutional - NAD, Comfortable  HEENT - NCAT, EOMI  Neck - Supple, No limited ROM  Chest - good chest expansion, good respiratory effort, CTAB  Cardio - warm and well perfused, RRR, no murmur  Abdomen -  Soft, NTND, hypoactive bowel sounds  Extremities - No peripheral edema, No calf tenderness   Neurologic Exam:                    Cognitive -             Orientation: Awake, Alert, AAO to self, place, date, year, situation            Attention:  Days of week, recall 2 of 3 objects without cuing            Memory: Recent/ remote memory intact, can name 1 current and 2 past presidents            Thought: process, content appropriate. Serial 7s x 3     Speech - Fluent, Comprehensible, No dysarthria, No aphasia      Cranial Nerves - left facial droop, Tongue midline, EOMI, Shoulder shrug intact     Motor -                      LEFT    UE - ShAdd 2/5, EF 2/5, EE 2/5, WE 0/5,  0/5                    RIGHT UE - ShAB 5/5, EF 5/5, EE 5/5, WE 5/5,  WNL                    LEFT    LE - HF 4+/5, KE 5/5, DF 5/5, PF 5/5                    RIGHT LE - HF 5/5, KE 5/5, DF 5/5, PF 5/5        Sensory - Intact to LT bilateral, reports decreased sensation over left arm compared to right     Reflexes - DTR 2 / 4 , neg Suazo's b/l, neg Babinski's b/l     Coordination - FTN intact on right     OculoVestibular -  No nystagmus  Psychiatric - Mood stable, Affect WNL  Skin on admission: no pressure ulcers Constitutional - NAD, Comfortable  HEENT - NCAT, EOMI  Neck - Supple, No limited ROM  Chest - good chest expansion, good respiratory effort, CTAB  Cardio - warm and well perfused, RRR, no murmur  Abdomen -  Soft, NTND, hypoactive bowel sounds  Extremities - No peripheral edema, No calf tenderness   Neurologic Exam:                    Cognitive -             Orientation: Awake, Alert, AAO to self, place, date, year, situation            Attention:  Days of week, recall 2 of 3 objects without cuing            Memory: Recent/ remote memory intact, can name 1 current and 2 past presidents            Thought: process, content appropriate. Serial 7s x 3     Speech - Fluent, Comprehensible, mild dysarthria, No aphasia      Cranial Nerves - left visual field cut, EOMI, left facial droop, tongue midline, Shoulder shrug intact     Motor -                      LEFT    UE - ShAdd 2/5, EF 2/5, EE 2/5, WE 0/5,  0/5                    RIGHT UE - ShAB 5/5, EF 5/5, EE 5/5, WE 5/5,  WNL                    LEFT    LE - HF 4+/5, KE 5/5, DF 5/5, PF 5/5                    RIGHT LE - HF 5/5, KE 5/5, DF 5/5, PF 5/5        Sensory - Intact to LT bilateral, reports decreased sensation over left arm compared to right     Reflexes - DTR 2 / 4 , neg Suazo's b/l, neg Babinski's b/l     Coordination - FTN intact on right     OculoVestibular -  No nystagmus  Psychiatric - Mood stable, Affect WNL  Skin on admission: no pressure ulcers Constitutional - NAD, Comfortable  HEENT - NCAT, EOMI  Neck - Supple, No limited ROM  Chest - good chest expansion, good respiratory effort, CTAB  Cardio - warm and well perfused, RRR, no murmur. + external cardiac monitor  Abdomen -  Soft, NTND, hypoactive bowel sounds  Extremities - No peripheral edema, No calf tenderness   Neurologic Exam:                    Cognitive -             Orientation: Awake, Alert, AAO to self, place, date, year, situation            Attention:  Days of week, recall 2 of 3 objects without cuing            Memory: Recent/ remote memory intact, can name 1 current and 2 past presidents            Thought: process, content appropriate. Serial 7s x 3     Speech - Fluent, Comprehensible, mild dysarthria, No aphasia      Cranial Nerves - left visual field cut, EOMI, left facial droop, tongue midline, Shoulder shrug intact     Motor -                      LEFT    UE - ShAdd 2/5, EF 2/5, EE 2/5, WE 0/5,  0/5                    RIGHT UE - ShAB 5/5, EF 5/5, EE 5/5, WE 5/5,  WNL                    LEFT    LE - HF 4+/5, KE 5/5, DF 5/5, PF 5/5                    RIGHT LE - HF 5/5, KE 5/5, DF 5/5, PF 5/5        Sensory - Intact to LT bilateral, reports decreased sensation over left arm compared to right     Reflexes - DTR 2 / 4 , neg Suazo's b/l, neg Babinski's b/l     Coordination - FTN intact on right     OculoVestibular -  No nystagmus  Psychiatric - Mood stable, Affect WNL  Skin on admission: no pressure ulcers Constitutional - NAD, Comfortable  HEENT - NCAT, EOMI  Neck - Supple,   Chest - good chest expansion, good respiratory effort, CTAB  Cardio - warm and well perfused, RRR, no murmur. + external cardiac monitor  Abdomen -  Soft, NTND, hypoactive bowel sounds  Extremities - No peripheral edema, No calf tenderness   Neurologic Exam:                    Cognitive -             Orientation: Awake, Alert, AAO to self, place, date, year, situation            Attention:  able to state Days of week backward, Difficulty with serial 7's            Memory: recall 1 of 3 objects without cuing after few mins, 3/3 words with categorical cues.  can name 1 current and 2 past presidents            Thought: somewhat disinhibited     Speech - Fluent, , mild dysarthria, No aphasia      Cranial Nerves - PERRLA, Left eye cataract, left visual field cut, EOMI, left facial droop, tongue midline, +dysarthria, Shoulder shrug intact     Motor -                      LEFT    UE - ShAdd 2/5, EF 2/5, EE 3/5, WE 0/5,  0/5                    RIGHT UE - ShAB 5/5, EF 5/5, EE 5/5, WE 5/5,  WNL                    LEFT    LE - HF 4+/5, KE 5/5, DF 5/5, PF 5/5                    RIGHT LE - HF 5/5, KE 5/5, DF 5/5, PF 5/5        Sensory - decreased sensation over left arm compared to right     Reflexes - DTR 2 / 4 , neg Suazo's b/l, neg Babinski's b/l     Coordination - FTN intact on right, FTN impaired Left UE,  HTS intact bilat.      OculoVestibular -  No nystagmus  Psychiatric - Mood stable, Affect WNL  Skin on admission: no pressure ulcers

## 2021-08-31 NOTE — DISCHARGE NOTE PROVIDER - NSDCCPCAREPLAN_GEN_ALL_CORE_FT
PRINCIPAL DISCHARGE DIAGNOSIS  Diagnosis: CVA (cerebrovascular accident)  Assessment and Plan of Treatment: f/up with Neuro and PCP; MCOT monitor f/up with Dr Palla

## 2021-08-31 NOTE — CHART NOTE - NSCHARTNOTEFT_GEN_A_CORE
patient came  for ARSH procedure to rule out cardiac source of embolism ,   procedure was start after obtaining  informed consent , under anaesthesia , tried to pass ARSH probe ,  unable to pass , tried  under guiding scope was not successful , tried with pediatric probe without success , procedure was terminated    Did TTE  bubble study ,   No evidence of PFO  on color doppler or on injection agitated saline , ascending aorta , arch aorta was  visualized  the visualized segment did not show any evidence atheromas  on visualized ascending aorta or arch     discussed with neurology , will decrease ecotrin to 81 mg po daily , add plavix 75 mg po daily continue statin     follow up as OP      discussed with dr AMALIA Vela neurology

## 2021-08-31 NOTE — PROCEDURAL SAFETY CHECKLIST WITH OR WITHOUT SEDATION - NSPOSTCOMMENTFT_GEN_ALL_CORE
Status post ARSH. Status post ARSH attempt.  Unable to pass probe. Status post ARSH attempt.  Unable to pass probe.  Tolerated procedure well.

## 2021-08-31 NOTE — H&P ADULT - ATTENDING COMMENTS
Pt. seen with fellow 9/1 AM.  Agree with documentation above as per fellow with amendments made as appropriate. Patient medically stable. Appropriate for acute rehabilitation.     Pt. with diarrhea overnight after laxatives.  Affected sleep.  Denies pain.   voiding PVR=60    Vital Signs Last 24 Hrs  T(C): 36.4 (01 Sep 2021 08:25), Max: 37.2 (31 Aug 2021 20:24)  T(F): 97.6 (01 Sep 2021 08:25), Max: 99 (31 Aug 2021 20:24)  HR: 84 (01 Sep 2021 08:25) (72 - 90)  BP: 115/73 (01 Sep 2021 08:25) (111/64 - 145/87)  BP(mean): 74 (31 Aug 2021 15:00) (74 - 96)  RR: 18 (01 Sep 2021 08:25) (14 - 18)  SpO2: 95% (01 Sep 2021 08:25) (90% - 97%)    Physical exam as amended above                          14.3   14.06 )-----------( 188      ( 01 Sep 2021 06:33 )             40.9   09-01    138  |  104  |  18  ----------------------------<  109<H>  4.5   |  26  |  1.23    Ca    8.6      01 Sep 2021 06:33  Phos  2.7     08-31  Mg     2.2     08-31    TPro  6.7  /  Alb  3.2<L>  /  TBili  1.1  /  DBili  x   /  AST  12  /  ALT  22  /  AlkPhos  73  09-01      86 Year old male with a PMHx of HLD, BPH, History of Supraventricular Tachycardia s/p ablation & Osteoarthritis who presented to  ED on 8/28 with L arm weakness and paraesthesia. + TPA. MRI shows right MCA watershed infarct. Admitted  to acute rehabilitation with left UE monoparesis, sensory impairment, dysarthria, mild cognitive deficits, gait and ADL impairments.    Leukocytosis- no c/o dysuria, no cough or congestion-- repeat CBC tomorrow.    -- voiding --continent    GI-- Multiple BMs after several days of constipation.  Will monitor.    EDEN--monitor --repeat BMP tomorrow    d/w hospitalist

## 2021-08-31 NOTE — H&P ADULT - NSHPSOCIALHISTORY_GEN_ALL_CORE
SOCIAL HISTORY - as per documentation/history  Smoking - None  EtOH - None  Drugs - None    FUNCTIONAL HISTORY  Lives in [facility]. He is residing in friends home [address] with BHR. PTA I Amb w/o AD Independent    CURRENT FUNCTIONAL STATUS  Supine to sit with minimum assist of one person  sit to stand with minimum assist with CG SOCIAL HISTORY - as per documentation/history  Smoking - None  EtOH - None  Drugs - None    FUNCTIONAL HISTORY  Lives in [place], [place] and [place] for several months each per year. He is residing in his girlfriend's home 3 [address] with BHR. PTA I Amb w/o AD Independent    CURRENT FUNCTIONAL STATUS  Supine to sit with minimum assist of one person  sit to stand with minimum assist with CG SOCIAL HISTORY - as per documentation/history  Smoking - None  EtOH - Occasionally  Drugs - None    FUNCTIONAL HISTORY  Lives in [place], [place] and [place] for several months each per year. He is residing in his girlfriend's home 3 [address] with BHR. 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

## 2021-08-31 NOTE — DISCHARGE NOTE NURSING/CASE MANAGEMENT/SOCIAL WORK - NSDCPEFALRISK_GEN_ALL_CORE
For information on Fall & injury Prevention, visit [email or web address]

## 2021-08-31 NOTE — H&P ADULT - NSHPLABSRESULTS_GEN_ALL_CORE
ICU Vital Signs Last 24 Hrs  T(C): 36.6 (31 Aug 2021 14:50), Max: 36.9 (30 Aug 2021 21:32)  T(F): 97.8 (31 Aug 2021 14:50), Max: 98.5 (31 Aug 2021 05:03)  HR: 76 (31 Aug 2021 15:00) (58 - 88)  BP: 111/64 (31 Aug 2021 15:00) (93/55 - 147/85)  BP(mean): 74 (31 Aug 2021 15:00) (63 - 101)  ABP: --  ABP(mean): --  RR: 16 (31 Aug 2021 15:00) (9 - 20)  SpO2: 96% (31 Aug 2021 15:00) (94% - 100%)      LABS                           14.5   7.04  )-----------( 204      ( 31 Aug 2021 07:02 )             41.3     08-31    137  |  107  |  17  ----------------------------<  99  3.8   |  27  |  0.98    Ca    8.4<L>      31 Aug 2021 07:02  Phos  2.7     08-31  Mg     2.2     08-31        Urinalysis Basic - ( 29 Aug 2021 20:37 )    Color: Yellow / Appearance: Clear / S.010 / pH: x  Gluc: x / Ketone: Negative  / Bili: Negative / Urobili: Negative mg/dL   Blood: x / Protein: Negative mg/dL / Nitrite: Negative   Leuk Esterase: Negative / RBC: x / WBC x   Sq Epi: x / Non Sq Epi: x / Bacteria: x    Radiology     INTERPRETATION:  MRI brain without contrast    History right-sided CVA    Comparison CT performed the prior day    There is a strip of cortical diffusion restriction and mild matching cytotoxic edema extending from anterior to posterior along a watershed distribution in the right hemisphere. There is no mass effect or parenchymal hemorrhage. There is mild central volume loss. White matter signal is relatively preserved. The orbital and sellar contents and cerebellar tonsils are within normal limits.    IMPRESSION:  Small acute uncomplicated right watershed infarct as above    --- End of Report ---    < end of copied text > Vital Signs Last 24 Hrs  T(C): 37.2 (31 Aug 2021 20:24), Max: 37.2 (31 Aug 2021 20:24)  T(F): 99 (31 Aug 2021 20:24), Max: 99 (31 Aug 2021 20:24)  HR: 90 (31 Aug 2021 20:24) (58 - 90)  BP: 125/68 (31 Aug 2021 20:24) (93/55 - 147/85)  BP(mean): 74 (31 Aug 2021 15:00) (63 - 101)  RR: 18 (31 Aug 2021 20:24) (9 - 18)  SpO2: 95% (31 Aug 2021 20:24) (94% - 100%)      LABS                           14.5   7.04  )-----------( 204      ( 31 Aug 2021 07:02 )             41.3     08-31    137  |  107  |  17  ----------------------------<  99  3.8   |  27  |  0.98    Ca    8.4<L>      31 Aug 2021 07:02  Phos  2.7     08-  Mg     2.2     08-31        Urinalysis Basic - ( 29 Aug 2021 20:37 )    Color: Yellow / Appearance: Clear / S.010 / pH: x  Gluc: x / Ketone: Negative  / Bili: Negative / Urobili: Negative mg/dL   Blood: x / Protein: Negative mg/dL / Nitrite: Negative   Leuk Esterase: Negative / RBC: x / WBC x   Sq Epi: x / Non Sq Epi: x / Bacteria: x    Radiology     < from: CT Angio Neck w/ IV Cont (21 @ 17:40) >  IMPRESSION:  1. The perfusion study is abnormal.  2. Focal calcified plaque in the proximal internal carotid artery on the right with at least moderate narrowing but no occlusion.  This may account for hypoperfusion of 254 mL at theTmax> 4 seconds. There is 20 mL hypoperfusion in the distal right MCA territory at the 6 second threshold  3. The other head and neck vessels are patent. No intraluminal thrombus noted intracranially.  4. A noncontrast CT study shows no evidence of hemorrhage, with generalized volume loss and involutional change. 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      MRI brain without contrast 21  Comparison CT performed the prior day  There is a strip of cortical diffusion restriction and mild matching cytotoxic edema extending from anterior to posterior along a watershed distribution in the right hemisphere. There is no mass effect or parenchymal hemorrhage. There is mild central volume loss. White matter signal is relatively preserved. The orbital and sellar contents and cerebellar tonsils are within normal limits.  IMPRESSION:  Small acute uncomplicated right watershed infarct as above

## 2021-08-31 NOTE — DISCHARGE NOTE PROVIDER - CARE PROVIDER_API CALL
Palla, Venugopal R (MD)  Cardiovascular Disease; Internal Medicine  [address], [place] [id number]  Phone: [phone]  Fax: [phone]  Follow Up Time: 1 month    Elis Vela  NEUROLOGY - GENERAL  [address] [place] [address], [address]  [place] [address]  Phone: [phone]  Fax: [phone]  Follow Up Time:     Naun Lewis  Phone: (   )    -  Fax: (   )    -  Follow Up Time:

## 2021-09-01 DIAGNOSIS — I63.9 CEREBRAL INFARCTION, UNSPECIFIED: ICD-10-CM

## 2021-09-01 DIAGNOSIS — I10 ESSENTIAL (PRIMARY) HYPERTENSION: ICD-10-CM

## 2021-09-01 DIAGNOSIS — E78.5 HYPERLIPIDEMIA, UNSPECIFIED: ICD-10-CM

## 2021-09-01 DIAGNOSIS — I47.1 SUPRAVENTRICULAR TACHYCARDIA: ICD-10-CM

## 2021-09-01 DIAGNOSIS — Z29.9 ENCOUNTER FOR PROPHYLACTIC MEASURES, UNSPECIFIED: ICD-10-CM

## 2021-09-01 DIAGNOSIS — D72.829 ELEVATED WHITE BLOOD CELL COUNT, UNSPECIFIED: ICD-10-CM

## 2021-09-01 DIAGNOSIS — G47.33 OBSTRUCTIVE SLEEP APNEA (ADULT) (PEDIATRIC): ICD-10-CM

## 2021-09-01 LAB
ALBUMIN SERPL ELPH-MCNC: 3.2 G/DL — LOW (ref 3.3–5)
ALP SERPL-CCNC: 73 U/L — SIGNIFICANT CHANGE UP (ref 40–120)
ALT FLD-CCNC: 22 U/L — SIGNIFICANT CHANGE UP (ref 10–45)
ANION GAP SERPL CALC-SCNC: 8 MMOL/L — SIGNIFICANT CHANGE UP (ref 5–17)
AST SERPL-CCNC: 12 U/L — SIGNIFICANT CHANGE UP (ref 10–40)
BILIRUB SERPL-MCNC: 1.1 MG/DL — SIGNIFICANT CHANGE UP (ref 0.2–1.2)
BUN SERPL-MCNC: 18 MG/DL — SIGNIFICANT CHANGE UP (ref 7–23)
CALCIUM SERPL-MCNC: 8.6 MG/DL — SIGNIFICANT CHANGE UP (ref 8.4–10.5)
CHLORIDE SERPL-SCNC: 104 MMOL/L — SIGNIFICANT CHANGE UP (ref 96–108)
CO2 SERPL-SCNC: 26 MMOL/L — SIGNIFICANT CHANGE UP (ref 22–31)
CREAT SERPL-MCNC: 1.23 MG/DL — SIGNIFICANT CHANGE UP (ref 0.5–1.3)
GLUCOSE SERPL-MCNC: 109 MG/DL — HIGH (ref 70–99)
HCT VFR BLD CALC: 40.9 % — SIGNIFICANT CHANGE UP (ref 39–50)
HGB BLD-MCNC: 14.3 G/DL — SIGNIFICANT CHANGE UP (ref 13–17)
MCHC RBC-ENTMCNC: 33.8 PG — SIGNIFICANT CHANGE UP (ref 27–34)
MCHC RBC-ENTMCNC: 35 GM/DL — SIGNIFICANT CHANGE UP (ref 32–36)
MCV RBC AUTO: 96.7 FL — SIGNIFICANT CHANGE UP (ref 80–100)
NRBC # BLD: 0 /100 WBCS — SIGNIFICANT CHANGE UP (ref 0–0)
PLATELET # BLD AUTO: 188 K/UL — SIGNIFICANT CHANGE UP (ref 150–400)
POTASSIUM SERPL-MCNC: 4.5 MMOL/L — SIGNIFICANT CHANGE UP (ref 3.5–5.3)
POTASSIUM SERPL-SCNC: 4.5 MMOL/L — SIGNIFICANT CHANGE UP (ref 3.5–5.3)
PROT SERPL-MCNC: 6.7 G/DL — SIGNIFICANT CHANGE UP (ref 6–8.3)
RBC # BLD: 4.23 M/UL — SIGNIFICANT CHANGE UP (ref 4.2–5.8)
RBC # FLD: 12.7 % — SIGNIFICANT CHANGE UP (ref 10.3–14.5)
SARS-COV-2 RNA SPEC QL NAA+PROBE: SIGNIFICANT CHANGE UP
SODIUM SERPL-SCNC: 138 MMOL/L — SIGNIFICANT CHANGE UP (ref 135–145)
WBC # BLD: 14.06 K/UL — HIGH (ref 3.8–10.5)
WBC # FLD AUTO: 14.06 K/UL — HIGH (ref 3.8–10.5)

## 2021-09-01 PROCEDURE — 99223 1ST HOSP IP/OBS HIGH 75: CPT

## 2021-09-01 RX ADMIN — CLOPIDOGREL BISULFATE 75 MILLIGRAM(S): 75 TABLET, FILM COATED ORAL at 11:51

## 2021-09-01 RX ADMIN — HEPARIN SODIUM 5000 UNIT(S): 5000 INJECTION INTRAVENOUS; SUBCUTANEOUS at 06:08

## 2021-09-01 RX ADMIN — HEPARIN SODIUM 5000 UNIT(S): 5000 INJECTION INTRAVENOUS; SUBCUTANEOUS at 13:17

## 2021-09-01 RX ADMIN — ATORVASTATIN CALCIUM 80 MILLIGRAM(S): 80 TABLET, FILM COATED ORAL at 21:11

## 2021-09-01 RX ADMIN — LATANOPROST 1 DROP(S): 0.05 SOLUTION/ DROPS OPHTHALMIC; TOPICAL at 21:11

## 2021-09-01 RX ADMIN — Medication 81 MILLIGRAM(S): at 11:51

## 2021-09-01 RX ADMIN — HEPARIN SODIUM 5000 UNIT(S): 5000 INJECTION INTRAVENOUS; SUBCUTANEOUS at 21:11

## 2021-09-01 NOTE — CONSULT NOTE ADULT - PROBLEM SELECTOR RECOMMENDATION 2
- noted to be elevated however patient afebrile and nontoxic appearing  - continue to monitor fever curve and monitor off abx

## 2021-09-01 NOTE — CONSULT NOTE ADULT - SUBJECTIVE AND OBJECTIVE BOX
Patient is a 86y old  Male who presents with a chief complaint of R MCA CVA (01 Sep 2021 12:11)    HPI:  Patient is a 86 Year old male with a PMHx of HLD, BPH, History of Supraventricular Tachycardia s/p ablation & Osteoarthritis, PREET on home cpap who presented to  ED on  who woke up from a nap and had L arm weakness and paraesthesia In the ED he had a negative Head CT and was given TPA. MRI of the head was significant for small uncomplicated right MCA watershed infarct. CT angio of brain showed proximal internal carotid artery with at least moderate narrowing but no occlusion. Post TPA Patient continued to have LUE > LLE weakness.   Patient was seen by cardiology who were unable to preform TTE due to probe not being passed successfully. TTE bubble study was negative for any PFO. Also recommended Ecotrin to 81 mg po daily, Plavix 75 mg po daily & Statin. Pt recommended for acute inpatient rehabilitation and was transferred to [facility] on 2021.      (31 Aug 2021 15:52)      Subjective History:  Patient seen and examined at bedside. Patient a little upset this morning because had to wait to go to bathroom otherwise feeling pretty good. Patient did not have BM for a few days however now with some diarrhea since last night after BM regimen. Patient admits to L arm weakness, denies weakness anywhere else. Patient denies chest pain, SOB, palpitations, headache, abd pain, numbness or tingling. Patient feeling well with no other acute complaints at this time.    PAST MEDICAL & SURGICAL HISTORY:  GERD (Gastroesophageal Reflux Disease)  Hyperlipidemia  Benign Prostatic Hypertrophy  Diverticulosis  Osteoarthritis  Osteoporosis  History of Supraventricular Tachycardia s/p ablation  History of Cholecystectomy  History of Prior Ablation Treatment  S/P Cataract Surgery left eye    SOCIAL HISTORY:  Lives at home with girlfriend  Admits to smoking but quit in 1970s  Denies EtoH or illicit drug use  Retired, used to own multiple businesses     FAMILY HISTORY:  no known history of CAD    ALLERGIES:  sulfa drugs (Unknown)    MEDICATIONS  (STANDING):  aspirin enteric coated 81 milliGRAM(s) Oral daily  atorvastatin 80 milliGRAM(s) Oral at bedtime  clopidogrel Tablet 75 milliGRAM(s) Oral daily  heparin   Injectable 5000 Unit(s) SubCutaneous every 8 hours  latanoprost 0.005% Ophthalmic Solution 1 Drop(s) Both EYES at bedtime  polyethylene glycol 3350 17 Gram(s) Oral daily  senna 2 Tablet(s) Oral at bedtime    MEDICATIONS  (PRN):  bisacodyl Suppository 10 milliGRAM(s) Rectal at bedtime PRN Constipation  melatonin 6 milliGRAM(s) Oral at bedtime PRN Insomnia    CONSTITUTIONAL: denies fever, chills, fatigue, admits to L arm weakness  HEENT: denies blurred vision, sore throat  SKIN: denies new lesions, rash  CARDIOVASCULAR: denies chest pain, chest pressure, palpitations  RESPIRATORY: denies shortness of breath, sputum production  GASTROINTESTINAL: denies nausea, vomiting, abdominal pain, admits to diarrhea  GENITOURINARY: denies dysuria, discharge  NEUROLOGICAL: denies numbness, headache  HEMATOLOGIC: denies gross bleeding, bruising  LYMPHATICS: denies enlarged lymph nodes, extremity swelling  PSYCHIATRIC: denies recent changes in anxiety, depression    Vital Signs Last 24 Hrs  T(F): 97.6 (01 Sep 2021 08:25), Max: 99 (31 Aug 2021 20:24)  HR: 84 (01 Sep 2021 08:25) (84 - 90)  BP: 115/73 (01 Sep 2021 08:25) (115/73 - 125/68)  RR: 18 (01 Sep 2021 08:25) (18 - 18)  SpO2: 95% (01 Sep 2021 08:25) (90% - 97%)  I&O's Summary    PHYSICAL EXAM:  GENERAL: NAD, well-groomed  HEAD:  Atraumatic, Normocephalic  EYES: PERRL, conjunctiva and sclera clear  ENMT: Moist mucous membranes, Good dentition  NECK: Supple, No JVD  CHEST/LUNG: Clear to auscultation bilaterally, non-labored breathing, good air entry  HEART: RRR; S1/S2, No murmur  ABDOMEN: Soft, Nontender, Nondistended; Bowel sounds present  VASCULAR: Normal pulses, Normal capillary refill  EXTREMITIES: No cyanosis, No edema  SKIN: Warm, Intact  PSYCH: Normal mood and affect  NERVOUS SYSTEM:  A/O x3, Good concentration; CN 2-12 intact, No focal deficits, moves all extremities    LABS:                        14.3   14.06 )-----------( 188      ( 01 Sep 2021 06:33 )             40.9     -    138  |  104  |  18  ----------------------------<  109  4.5   |  26  |  1.23    Ca    8.6      01 Sep 2021 06:33  Phos  2.7       Mg     2.2         TPro  6.7  /  Alb  3.2  /  TBili  1.1  /  DBili  x   /  AST  12  /  ALT  22  /  AlkPhos  73      eGFR if Non African American: 53 mL/min/1.73M2 (21 @ 06:33)  eGFR if : 61 mL/min/1.73M2 (21 @ 06:33)                                Urinalysis Basic - ( 29 Aug 2021 20:37 )    Color: Yellow / Appearance: Clear / S.010 / pH: x  Gluc: x / Ketone: Negative  / Bili: Negative / Urobili: Negative mg/dL   Blood: x / Protein: Negative mg/dL / Nitrite: Negative   Leuk Esterase: Negative / RBC: x / WBC x   Sq Epi: x / Non Sq Epi: x / Bacteria: x        COVID-19 PCR: NotDetec (21 @ 20:57)  COVID-19 PCR: NotDetec (21 @ 17:30)    RADIOLOGY & ADDITIONAL TESTS:    Care Discussed with Consultants/Other Providers: Patient is a 86y old  Male who presents with a chief complaint of R MCA CVA (01 Sep 2021 12:11)    HPI:  Patient is a 86 Year old male with a PMHx of HLD, BPH, History of Supraventricular Tachycardia s/p ablation & Osteoarthritis, PREET on home cpap who presented to  ED on  who woke up from a nap and had L arm weakness and paraesthesia In the ED he had a negative Head CT and was given TPA. MRI of the head was significant for small uncomplicated right MCA watershed infarct. CT angio of brain showed proximal internal carotid artery with at least moderate narrowing but no occlusion. Post TPA Patient continued to have LUE > LLE weakness.   Patient was seen by cardiology who were unable to preform TTE due to probe not being passed successfully. TTE bubble study was negative for any PFO. Also recommended Ecotrin to 81 mg po daily, Plavix 75 mg po daily & Statin. Pt recommended for acute inpatient rehabilitation and was transferred to [facility] on 2021.      (31 Aug 2021 15:52)      Subjective History:  Patient seen and examined at bedside. Patient a little upset this morning because had to wait to go to bathroom otherwise feeling pretty good. Patient did not have BM for a few days however now with some diarrhea since last night after BM regimen. Patient admits to L arm weakness, denies weakness anywhere else. Patient denies chest pain, SOB, palpitations, headache, abd pain, numbness or tingling. Patient feeling well with no other acute complaints at this time.    PAST MEDICAL & SURGICAL HISTORY:  GERD (Gastroesophageal Reflux Disease)  Hyperlipidemia  Benign Prostatic Hypertrophy  Diverticulosis  Osteoarthritis  Osteoporosis  History of Supraventricular Tachycardia s/p ablation  History of Cholecystectomy  History of Prior Ablation Treatment  S/P Cataract Surgery left eye    SOCIAL HISTORY:  Lives at home with girlfriend  Admits to smoking but quit in 1970s  Denies EtoH or illicit drug use  Retired, used to own multiple businesses     FAMILY HISTORY:  no known history of CAD    ALLERGIES:  sulfa drugs (Unknown)    MEDICATIONS  (STANDING):  aspirin enteric coated 81 milliGRAM(s) Oral daily  atorvastatin 80 milliGRAM(s) Oral at bedtime  clopidogrel Tablet 75 milliGRAM(s) Oral daily  heparin   Injectable 5000 Unit(s) SubCutaneous every 8 hours  latanoprost 0.005% Ophthalmic Solution 1 Drop(s) Both EYES at bedtime  polyethylene glycol 3350 17 Gram(s) Oral daily  senna 2 Tablet(s) Oral at bedtime    MEDICATIONS  (PRN):  bisacodyl Suppository 10 milliGRAM(s) Rectal at bedtime PRN Constipation  melatonin 6 milliGRAM(s) Oral at bedtime PRN Insomnia    CONSTITUTIONAL: denies fever, chills, fatigue, admits to L arm weakness  HEENT: denies blurred vision, sore throat  SKIN: denies new lesions, rash  CARDIOVASCULAR: denies chest pain, chest pressure, palpitations  RESPIRATORY: denies shortness of breath, sputum production  GASTROINTESTINAL: denies nausea, vomiting, abdominal pain, admits to diarrhea  GENITOURINARY: denies dysuria, discharge  NEUROLOGICAL: denies numbness, headache  HEMATOLOGIC: denies gross bleeding, bruising  LYMPHATICS: denies enlarged lymph nodes, extremity swelling  PSYCHIATRIC: denies recent changes in anxiety, depression    Vital Signs Last 24 Hrs  T(F): 97.6 (01 Sep 2021 08:25), Max: 99 (31 Aug 2021 20:24)  HR: 84 (01 Sep 2021 08:25) (84 - 90)  BP: 115/73 (01 Sep 2021 08:25) (115/73 - 125/68)  RR: 18 (01 Sep 2021 08:25) (18 - 18)  SpO2: 95% (01 Sep 2021 08:25) (90% - 97%)  I&O's Summary    PHYSICAL EXAM:  GENERAL: NAD, well-groomed  HEAD:  Atraumatic, Normocephalic  EYES: PERRL, conjunctiva and sclera clear  ENMT: Moist mucous membranes, Good dentition  NECK: Supple, No JVD  CHEST/LUNG: Clear to auscultation bilaterally, non-labored breathing, good air entry  HEART: RRR; S1/S2, No murmur  ABDOMEN: Soft, Nontender, Nondistended; Bowel sounds present  VASCULAR: Normal pulses, Normal capillary refill  EXTREMITIES: No cyanosis, No edema  SKIN: Warm, Intact  PSYCH: Normal mood and affect  NERVOUS SYSTEM:  A/O x3, Good concentration; 2-3/5 strength in LUE, 4/5 strength LLE, 5/5 strength RUE and RLE, L facial droop    LABS:                        14.3   14.06 )-----------( 188      ( 01 Sep 2021 06:33 )             40.9         138  |  104  |  18  ----------------------------<  109  4.5   |  26  |  1.23    Ca    8.6      01 Sep 2021 06:33  Phos  2.7       Mg     2.2         TPro  6.7  /  Alb  3.2  /  TBili  1.1  /  DBili  x   /  AST  12  /  ALT  22  /  AlkPhos  73      eGFR if Non African American: 53 mL/min/1.73M2 (21 @ 06:33)  eGFR if : 61 mL/min/1.73M2 (21 @ 06:33)                                Urinalysis Basic - ( 29 Aug 2021 20:37 )    Color: Yellow / Appearance: Clear / S.010 / pH: x  Gluc: x / Ketone: Negative  / Bili: Negative / Urobili: Negative mg/dL   Blood: x / Protein: Negative mg/dL / Nitrite: Negative   Leuk Esterase: Negative / RBC: x / WBC x   Sq Epi: x / Non Sq Epi: x / Bacteria: x        COVID-19 PCR: NotDetec (21 @ 20:57)  COVID-19 PCR: NotDetec (21 @ 17:30)    RADIOLOGY & ADDITIONAL TESTS: imaging and labs reviewed personally    Care Discussed with Consultants/Other Providers: discussed with Dr. Salas

## 2021-09-01 NOTE — CONSULT NOTE ADULT - PROBLEM SELECTOR RECOMMENDATION 9
- noted to have acute R MCA infarct on MRI s/p tPA  - CTA neck with R ICA bifurcation with moderate narrowing  - cardio wanted to perform ARSH as cardioembolic cause could not be excluded (moderate narrowing of proximal internal carotid artery unlikely to explain R watershed infarct)  - unable to perform ARSH at  as unable to be passed and therefore TTE bubble study was performed which was negative for PFO  - lipid panel wnl except HDL 35, HbA1C 5.6  - patient to follow up with Dr. Palla, cardio, outpatient for holter/ MCOT monitor  - continue with DAPT with asa/plavix and continue with statin

## 2021-09-01 NOTE — CONSULT NOTE ADULT - ASSESSMENT
86 Year old male with a PMHx of HLD, BPH, History of Supraventricular Tachycardia s/p ablation & Osteoarthritis who presented to  ED on 8/28 with L arm weakness and paraesthesia. + TPA. MRI shows right MCA watershed infarct. Admitted  to acute rehabilitation with left UE monoparesis, sensory impairment, dysarthria, mild cognitive deficits, gait and ADL impairments.

## 2021-09-01 NOTE — DIETITIAN INITIAL EVALUATION ADULT. - PERTINENT MEDS FT
MEDICATIONS  (STANDING):  aspirin enteric coated 81 milliGRAM(s) Oral daily  atorvastatin 80 milliGRAM(s) Oral at bedtime  clopidogrel Tablet 75 milliGRAM(s) Oral daily  heparin   Injectable 5000 Unit(s) SubCutaneous every 8 hours  latanoprost 0.005% Ophthalmic Solution 1 Drop(s) Both EYES at bedtime  polyethylene glycol 3350 17 Gram(s) Oral daily  senna 2 Tablet(s) Oral at bedtime    MEDICATIONS  (PRN):  bisacodyl Suppository 10 milliGRAM(s) Rectal at bedtime PRN Constipation  melatonin 6 milliGRAM(s) Oral at bedtime PRN Insomnia

## 2021-09-01 NOTE — DIETITIAN INITIAL EVALUATION ADULT. - ORAL INTAKE PTA/DIET HISTORY
Pt reports excellent PO intake/appetite PTA. Pt reports he eats 2-3 meals/day at home, likes eating at restaurants. Pt avoids adding salt to food, per pt. Pt reports he consumes fruit and vegetables regularly, usually consumes mixed berries with breakfast daily.

## 2021-09-01 NOTE — DIETITIAN INITIAL EVALUATION ADULT. - OTHER INFO
86 Year old male with a PMHx of HLD, BPH, History of Supraventricular Tachycardia s/p ablation & Osteoarthritis who presented to  ED on 8/28 with L arm weakness and paraesthesia. + TPA. MRI shows right MCA watershed infarct. Admitted  to acute rehabilitation with left UE monoparesis, sensory impairment, dysarthria, mild cognitive deficits, gait and ADL impairments.    Pt tolerating current diet, reports good PO intake. Provided with explanation of DASH/TLC diet and menu options. Pt reports UBW of ~165 lbs. Current weight is 172.9 lbs. No edema per nursing flow sheets. Pt was complaining of constipation ,received laxatives and had diarrhea this morning. Pt reports hx of constipation @ home. Reports that he likely does not consume enough fluids. Encouraged fluid & fiber intake. Pt receptive to adding berries daily @ breakfast. noted w/ skin tear L arm per nursing flow sheets.

## 2021-09-01 NOTE — DIETITIAN INITIAL EVALUATION ADULT. - PERTINENT LABORATORY DATA
9/1/21: WBC 14.06 (H), Na 138 (WNL), K 4.5 (WNL), Cl 104 (WNL), BUN 18 (WNL), Creat 1.23 (WNL), Glu 109 (H), Alb 3.2 (L), eGFR 53 (L)  8/28/21: HbA1c 5.6 (WNL)

## 2021-09-02 DIAGNOSIS — M81.0 AGE-RELATED OSTEOPOROSIS WITHOUT CURRENT PATHOLOGICAL FRACTURE: ICD-10-CM

## 2021-09-02 DIAGNOSIS — K21.9 GASTRO-ESOPHAGEAL REFLUX DISEASE WITHOUT ESOPHAGITIS: ICD-10-CM

## 2021-09-02 DIAGNOSIS — Z20.822 CONTACT WITH AND (SUSPECTED) EXPOSURE TO COVID-19: ICD-10-CM

## 2021-09-02 DIAGNOSIS — R47.1 DYSARTHRIA AND ANARTHRIA: ICD-10-CM

## 2021-09-02 DIAGNOSIS — N40.0 BENIGN PROSTATIC HYPERPLASIA WITHOUT LOWER URINARY TRACT SYMPTOMS: ICD-10-CM

## 2021-09-02 DIAGNOSIS — G81.94 HEMIPLEGIA, UNSPECIFIED AFFECTING LEFT NONDOMINANT SIDE: ICD-10-CM

## 2021-09-02 DIAGNOSIS — I65.21 OCCLUSION AND STENOSIS OF RIGHT CAROTID ARTERY: ICD-10-CM

## 2021-09-02 DIAGNOSIS — H91.90 UNSPECIFIED HEARING LOSS, UNSPECIFIED EAR: ICD-10-CM

## 2021-09-02 DIAGNOSIS — I63.9 CEREBRAL INFARCTION, UNSPECIFIED: ICD-10-CM

## 2021-09-02 DIAGNOSIS — Z88.2 ALLERGY STATUS TO SULFONAMIDES: ICD-10-CM

## 2021-09-02 DIAGNOSIS — E78.5 HYPERLIPIDEMIA, UNSPECIFIED: ICD-10-CM

## 2021-09-02 LAB
ALBUMIN SERPL ELPH-MCNC: 3.3 G/DL — SIGNIFICANT CHANGE UP (ref 3.3–5)
ALP SERPL-CCNC: 72 U/L — SIGNIFICANT CHANGE UP (ref 40–120)
ALT FLD-CCNC: 29 U/L — SIGNIFICANT CHANGE UP (ref 10–45)
ANION GAP SERPL CALC-SCNC: 10 MMOL/L — SIGNIFICANT CHANGE UP (ref 5–17)
AST SERPL-CCNC: 19 U/L — SIGNIFICANT CHANGE UP (ref 10–40)
BASOPHILS # BLD AUTO: 0.05 K/UL — SIGNIFICANT CHANGE UP (ref 0–0.2)
BASOPHILS NFR BLD AUTO: 0.4 % — SIGNIFICANT CHANGE UP (ref 0–2)
BILIRUB SERPL-MCNC: 0.8 MG/DL — SIGNIFICANT CHANGE UP (ref 0.2–1.2)
BUN SERPL-MCNC: 20 MG/DL — SIGNIFICANT CHANGE UP (ref 7–23)
CALCIUM SERPL-MCNC: 8.6 MG/DL — SIGNIFICANT CHANGE UP (ref 8.4–10.5)
CHLORIDE SERPL-SCNC: 103 MMOL/L — SIGNIFICANT CHANGE UP (ref 96–108)
CO2 SERPL-SCNC: 22 MMOL/L — SIGNIFICANT CHANGE UP (ref 22–31)
CREAT SERPL-MCNC: 1.06 MG/DL — SIGNIFICANT CHANGE UP (ref 0.5–1.3)
EOSINOPHIL # BLD AUTO: 0.11 K/UL — SIGNIFICANT CHANGE UP (ref 0–0.5)
EOSINOPHIL NFR BLD AUTO: 0.9 % — SIGNIFICANT CHANGE UP (ref 0–6)
GLUCOSE SERPL-MCNC: 107 MG/DL — HIGH (ref 70–99)
HCT VFR BLD CALC: 40.1 % — SIGNIFICANT CHANGE UP (ref 39–50)
HGB BLD-MCNC: 14.3 G/DL — SIGNIFICANT CHANGE UP (ref 13–17)
IMM GRANULOCYTES NFR BLD AUTO: 0.5 % — SIGNIFICANT CHANGE UP (ref 0–1.5)
LYMPHOCYTES # BLD AUTO: 1.82 K/UL — SIGNIFICANT CHANGE UP (ref 1–3.3)
LYMPHOCYTES # BLD AUTO: 14.9 % — SIGNIFICANT CHANGE UP (ref 13–44)
MCHC RBC-ENTMCNC: 33.9 PG — SIGNIFICANT CHANGE UP (ref 27–34)
MCHC RBC-ENTMCNC: 35.7 GM/DL — SIGNIFICANT CHANGE UP (ref 32–36)
MCV RBC AUTO: 95 FL — SIGNIFICANT CHANGE UP (ref 80–100)
MONOCYTES # BLD AUTO: 1.29 K/UL — HIGH (ref 0–0.9)
MONOCYTES NFR BLD AUTO: 10.6 % — SIGNIFICANT CHANGE UP (ref 2–14)
NEUTROPHILS # BLD AUTO: 8.88 K/UL — HIGH (ref 1.8–7.4)
NEUTROPHILS NFR BLD AUTO: 72.7 % — SIGNIFICANT CHANGE UP (ref 43–77)
NRBC # BLD: 0 /100 WBCS — SIGNIFICANT CHANGE UP (ref 0–0)
PLATELET # BLD AUTO: 199 K/UL — SIGNIFICANT CHANGE UP (ref 150–400)
POTASSIUM SERPL-MCNC: 4.2 MMOL/L — SIGNIFICANT CHANGE UP (ref 3.5–5.3)
POTASSIUM SERPL-SCNC: 4.2 MMOL/L — SIGNIFICANT CHANGE UP (ref 3.5–5.3)
PROT SERPL-MCNC: 7.1 G/DL — SIGNIFICANT CHANGE UP (ref 6–8.3)
RBC # BLD: 4.22 M/UL — SIGNIFICANT CHANGE UP (ref 4.2–5.8)
RBC # FLD: 12.7 % — SIGNIFICANT CHANGE UP (ref 10.3–14.5)
SODIUM SERPL-SCNC: 135 MMOL/L — SIGNIFICANT CHANGE UP (ref 135–145)
WBC # BLD: 12.21 K/UL — HIGH (ref 3.8–10.5)
WBC # FLD AUTO: 12.21 K/UL — HIGH (ref 3.8–10.5)

## 2021-09-02 PROCEDURE — 99232 SBSQ HOSP IP/OBS MODERATE 35: CPT

## 2021-09-02 RX ORDER — PHENOL/SODIUM PHENOLATE
1 AEROSOL, SPRAY (ML) MUCOUS MEMBRANE THREE TIMES A DAY
Refills: 0 | Status: DISCONTINUED | OUTPATIENT
Start: 2021-09-02 | End: 2021-09-17

## 2021-09-02 RX ORDER — PANTOPRAZOLE SODIUM 20 MG/1
40 TABLET, DELAYED RELEASE ORAL
Refills: 0 | Status: DISCONTINUED | OUTPATIENT
Start: 2021-09-02 | End: 2021-09-10

## 2021-09-02 RX ORDER — FLUTICASONE PROPIONATE 50 MCG
1 SPRAY, SUSPENSION NASAL
Refills: 0 | Status: DISCONTINUED | OUTPATIENT
Start: 2021-09-02 | End: 2021-09-17

## 2021-09-02 RX ORDER — BENZOCAINE 10 %
1 GEL (GRAM) MUCOUS MEMBRANE THREE TIMES A DAY
Refills: 0 | Status: DISCONTINUED | OUTPATIENT
Start: 2021-09-02 | End: 2021-09-02

## 2021-09-02 RX ORDER — BENZOCAINE AND MENTHOL 5; 1 G/100ML; G/100ML
1 LIQUID ORAL
Refills: 0 | Status: DISCONTINUED | OUTPATIENT
Start: 2021-09-02 | End: 2021-09-17

## 2021-09-02 RX ADMIN — Medication 1 SPRAY(S): at 09:14

## 2021-09-02 RX ADMIN — HEPARIN SODIUM 5000 UNIT(S): 5000 INJECTION INTRAVENOUS; SUBCUTANEOUS at 16:05

## 2021-09-02 RX ADMIN — Medication 1 SPRAY(S): at 22:39

## 2021-09-02 RX ADMIN — HEPARIN SODIUM 5000 UNIT(S): 5000 INJECTION INTRAVENOUS; SUBCUTANEOUS at 21:25

## 2021-09-02 RX ADMIN — HEPARIN SODIUM 5000 UNIT(S): 5000 INJECTION INTRAVENOUS; SUBCUTANEOUS at 05:53

## 2021-09-02 RX ADMIN — LATANOPROST 1 DROP(S): 0.05 SOLUTION/ DROPS OPHTHALMIC; TOPICAL at 21:24

## 2021-09-02 RX ADMIN — CLOPIDOGREL BISULFATE 75 MILLIGRAM(S): 75 TABLET, FILM COATED ORAL at 11:52

## 2021-09-02 RX ADMIN — PANTOPRAZOLE SODIUM 40 MILLIGRAM(S): 20 TABLET, DELAYED RELEASE ORAL at 17:39

## 2021-09-02 RX ADMIN — Medication 81 MILLIGRAM(S): at 11:52

## 2021-09-02 RX ADMIN — ATORVASTATIN CALCIUM 80 MILLIGRAM(S): 80 TABLET, FILM COATED ORAL at 21:24

## 2021-09-02 RX ADMIN — Medication 1 SPRAY(S): at 09:12

## 2021-09-02 NOTE — PROGRESS NOTE ADULT - SUBJECTIVE AND OBJECTIVE BOX
HPI:     Patient is a 86 Year old male with a PMHx of HLD, BPH, History of Supraventricular Tachycardia s/p ablation & Osteoarthritis, PREET on home cpap who presented to  ED on 8/28 who woke up from a nap and had L arm weakness and paraesthesia In the ED he had a negative Head CT and was given TPA. MRI of the head was significant for small uncomplicated right MCA watershed infarct. CT angio of brain showed proximal internal carotid artery with at least moderate narrowing but no occlusion. Post TPA Patient continued to have LUE > LLE weakness.   Patient was seen by cardiology who were unable to preform TTE due to probe not being passed successfully. TTE bubble study was negative for any PFO. Also recommended Ecotrin to 81 mg po daily, Plavix 75 mg po daily & Statin. Pt recommended for acute inpatient rehabilitation and was transferred to [facility] on 8/31/2021.     PAST MEDICAL HISTORY:  Benign Prostatic Hypertrophy     Diverticulosis     GERD (Gastroesophageal Reflux Disease)     History of Supraventricular Tachycardia s/p ablation    Hyperlipidemia     Osteoarthritis     Osteoporosis.     PAST SURGICAL HISTORY:  History of Cholecystectomy     History of Prior Ablation Treatment     S/P Cataract Surgery left eye.    Subjective:    Patient seen and examined at bedside.  No events overnight.   Pt was complaining of soar throat this am. States the Cepacol sore throat spray is helping.   Pt slept well overnight as per nursing note.   Denies any pain at this time.   Denies any-other complaints at this time.   Participating in therapy     ROS:   + nasal Congestion   - Cough   Denies any other symptoms.      Physical Exam:   Physical Exam: Constitutional - NAD, Comfortable  HEENT - NCAT, EOMI  Neck - Supple,   Chest - good chest expansion, good respiratory effort, CTAB  Cardio - warm and well perfused, RRR, no murmur. + external cardiac monitor  Abdomen -  Soft, NTND, hypoactive bowel sounds  Extremities - No peripheral edema, No calf tenderness   Neurologic Exam:                    Cognitive -             Orientation: Awake, Alert, AAO to self, place, date, year, situation            Attention:  able to state Days of week backward, Difficulty with serial 7's            Memory: recall 1 of 3 objects without cuing after few mins, 3/3 words with categorical cues.  can name 1 current and 2 past presidents            Thought: somewhat disinhibited     Speech - Fluent, , mild dysarthria, No aphasia      Cranial Nerves - PERRLA, Left eye cataract, left visual field cut, EOMI, left facial droop, tongue midline, +dysarthria, Shoulder shrug intact     Motor -                      LEFT    UE - ShAdd 2/5, EF 2/5, EE 3/5, WE 0/5,  0/5                    RIGHT UE - ShAB 5/5, EF 5/5, EE 5/5, WE 5/5,  WNL  MEDICATIONS  (STANDING):  aspirin enteric coated 81 milliGRAM(s) Oral daily  atorvastatin 80 milliGRAM(s) Oral at bedtime  clopidogrel Tablet 75 milliGRAM(s) Oral daily  heparin   Injectable 5000 Unit(s) SubCutaneous every 8 hours  latanoprost 0.005% Ophthalmic Solution 1 Drop(s) Both EYES at bedtime  polyethylene glycol 3350 17 Gram(s) Oral daily  senna 2 Tablet(s) Oral at bedtime    MEDICATIONS  (PRN):  benzocaine 15 mG/menthol 3.6 mG (Sugar-Free) Lozenge 1 Lozenge Oral two times a day PRN Sore Throat  bisacodyl Suppository 10 milliGRAM(s) Rectal at bedtime PRN Constipation  fluticasone propionate 50 MICROgram(s)/spray Nasal Spray 1 Spray(s) Both Nostrils two times a day PRN nasal congestion  melatonin 6 milliGRAM(s) Oral at bedtime PRN Insomnia  phenol 1.4% (CHLORASEPTIC) Oral Spray 1 Spray(s) Topical three times a day PRN sore throat                    LEFT    LE - HF 4+/5, KE 5/5, DF 5/5, PF 5/5                    RIGHT LE - HF 5/5, KE 5/5, DF 5/5, PF 5/5        Sensory - decreased sensation over left arm compared to right     Reflexes - DTR 2 / 4 , neg Suazo's b/l, neg Babinski's b/l     Coordination - FTN intact on right, FTN impaired Left UE,  HTS intact bilat.      OculoVestibular -  No nystagmus  Psychiatric - Mood stable, Affect WNL  Skin on admission: no pressure ulcers        ICU Vital Signs Last 24 Hrs  T(C): 36.7 (02 Sep 2021 07:44), Max: 36.7 (01 Sep 2021 21:10)  T(F): 98 (02 Sep 2021 07:44), Max: 98 (01 Sep 2021 21:10)  HR: 71 (02 Sep 2021 07:44) (71 - 83)  BP: 112/72 (02 Sep 2021 07:44) (112/72 - 144/84)  BP(mean): --  ABP: --  ABP(mean): --  RR: 16 (02 Sep 2021 07:44) (15 - 16)  SpO2: 96% (02 Sep 2021 07:44) (95% - 96%)      LABS                         14.3   12.21 )-----------( 199      ( 02 Sep 2021 05:30 )             40.1     09-02    135  |  103  |  20  ----------------------------<  107<H>  4.2   |  22  |  1.06    Ca    8.6      02 Sep 2021 05:30    TPro  7.1  /  Alb  3.3  /  TBili  0.8  /  DBili  x   /  AST  19  /  ALT  29  /  AlkPhos  72  09-02    MEDICATIONS  (STANDING):  aspirin enteric coated 81 milliGRAM(s) Oral daily  atorvastatin 80 milliGRAM(s) Oral at bedtime  clopidogrel Tablet 75 milliGRAM(s) Oral daily  heparin   Injectable 5000 Unit(s) SubCutaneous every 8 hours  latanoprost 0.005% Ophthalmic Solution 1 Drop(s) Both EYES at bedtime  polyethylene glycol 3350 17 Gram(s) Oral daily  senna 2 Tablet(s) Oral at bedtime    MEDICATIONS  (PRN):  benzocaine 15 mG/menthol 3.6 mG (Sugar-Free) Lozenge 1 Lozenge Oral two times a day PRN Sore Throat  bisacodyl Suppository 10 milliGRAM(s) Rectal at bedtime PRN Constipation  fluticasone propionate 50 MICROgram(s)/spray Nasal Spray 1 Spray(s) Both Nostrils two times a day PRN nasal congestion  melatonin 6 milliGRAM(s) Oral at bedtime PRN Insomnia  phenol 1.4% (CHLORASEPTIC) Oral Spray 1 Spray(s) Topical three times a day PRN sore throat     HPI:     Patient is a 86 Year old male with a PMHx of HLD, BPH, History of Supraventricular Tachycardia s/p ablation & Osteoarthritis, PREET on home cpap who presented to  ED on 8/28 who woke up from a nap and had L arm weakness and paraesthesia In the ED he had a negative Head CT and was given TPA. MRI of the head was significant for small uncomplicated right MCA watershed infarct. CT angio of brain showed proximal internal carotid artery with at least moderate narrowing but no occlusion. Post TPA Patient continued to have LUE > LLE weakness.   Patient was seen by cardiology who were unable to preform TTE due to probe not being passed successfully. TTE bubble study was negative for any PFO. Also recommended Ecotrin to 81 mg po daily, Plavix 75 mg po daily & Statin. Pt recommended for acute inpatient rehabilitation and was transferred to [facility] on 8/31/2021.     PAST MEDICAL HISTORY:  Benign Prostatic Hypertrophy     Diverticulosis     GERD (Gastroesophageal Reflux Disease)     History of Supraventricular Tachycardia s/p ablation    Hyperlipidemia     Osteoarthritis     Osteoporosis.     PAST SURGICAL HISTORY:  History of Cholecystectomy     History of Prior Ablation Treatment     S/P Cataract Surgery left eye.    Subjective:    Patient seen and examined at bedside.  No events overnight.   Pt was complaining of soar throat this am. States the Cepacol sore throat spray is helping.   Pt slept well overnight as per nursing note.   Denies any pain at this time.   Denies any-other complaints at this time.   Participating in therapy     ICU Vital Signs Last 24 Hrs  T(C): 36.7 (02 Sep 2021 07:44), Max: 36.7 (01 Sep 2021 21:10)  T(F): 98 (02 Sep 2021 07:44), Max: 98 (01 Sep 2021 21:10)  HR: 71 (02 Sep 2021 07:44) (71 - 83)  BP: 112/72 (02 Sep 2021 07:44) (112/72 - 144/84)  BP(mean): --  ABP: --  ABP(mean): --  RR: 16 (02 Sep 2021 07:44) (15 - 16)  SpO2: 96% (02 Sep 2021 07:44) (95% - 96%)      ROS:   + nasal Congestion   - Cough   Denies any other symptoms.        Physical Exam:   Physical Exam: Constitutional - NAD, Comfortable  HEENT - NCAT, EOMI  Neck - Supple,   Chest - good chest expansion, good respiratory effort, CTAB  Cardio - warm and well perfused, RRR, no murmur. + external cardiac monitor  Abdomen -  Soft, NTND, hypoactive bowel sounds  Extremities - No peripheral edema, No calf tenderness   Neurologic Exam:                    Cognitive -             Orientation: Awake, Alert, AAO to self, place, date, year, situation            Attention:  able to state Days of week backward, Difficulty with serial 7's            Memory: recall 1 of 3 objects without cuing after few mins, 3/3 words with categorical cues.  can name 1 current and 2 past presidents            Thought: somewhat disinhibited     Speech - Fluent, , mild dysarthria, No aphasia      Cranial Nerves - PERRLA, Left eye cataract, left visual field cut, EOMI, left facial droop, tongue midline, +dysarthria, Shoulder shrug intact     Motor -                      LEFT    UE - ShAdd 2/5, EF 2/5, EE 3/5, WE 0/5,  0/5                    RIGHT UE - ShAB 5/5, EF 5/5, EE 5/5, WE 5/5,  WNL  LEFT    LE - HF 4+/5, KE 5/5, DF 5/5, PF 5/5                    RIGHT LE - HF 5/5, KE 5/5, DF 5/5, PF 5/5        Sensory - decreased sensation over left arm compared to right     Reflexes - DTR 2 / 4 , neg Suazo's b/l, neg Babinski's b/l     Coordination - FTN intact on right, FTN impaired Left UE,  HTS intact bilat.      OculoVestibular -  No nystagmus  Psychiatric - Mood stable, Affect WNL  Skin on admission: no pressure ulcers      MEDICATIONS  (STANDING):  aspirin enteric coated 81 milliGRAM(s) Oral daily  atorvastatin 80 milliGRAM(s) Oral at bedtime  clopidogrel Tablet 75 milliGRAM(s) Oral daily  heparin   Injectable 5000 Unit(s) SubCutaneous every 8 hours  latanoprost 0.005% Ophthalmic Solution 1 Drop(s) Both EYES at bedtime  polyethylene glycol 3350 17 Gram(s) Oral daily  senna 2 Tablet(s) Oral at bedtime    MEDICATIONS  (PRN):  benzocaine 15 mG/menthol 3.6 mG (Sugar-Free) Lozenge 1 Lozenge Oral two times a day PRN Sore Throat  bisacodyl Suppository 10 milliGRAM(s) Rectal at bedtime PRN Constipation  fluticasone propionate 50 MICROgram(s)/spray Nasal Spray 1 Spray(s) Both Nostrils two times a day PRN nasal congestion  melatonin 6 milliGRAM(s) Oral at bedtime PRN Insomnia  phenol 1.4% (CHLORASEPTIC) Oral Spray 1 Spray(s) Topical three times a day PRN sore throat                                  LABS                         14.3   12.21 )-----------( 199      ( 02 Sep 2021 05:30 )             40.1     09-02    135  |  103  |  20  ----------------------------<  107<H>  4.2   |  22  |  1.06    Ca    8.6      02 Sep 2021 05:30    TPro  7.1  /  Alb  3.3  /  TBili  0.8  /  DBili  x   /  AST  19  /  ALT  29  /  AlkPhos  72  09-02    MEDICATIONS  (STANDING):  aspirin enteric coated 81 milliGRAM(s) Oral daily  atorvastatin 80 milliGRAM(s) Oral at bedtime  clopidogrel Tablet 75 milliGRAM(s) Oral daily  heparin   Injectable 5000 Unit(s) SubCutaneous every 8 hours  latanoprost 0.005% Ophthalmic Solution 1 Drop(s) Both EYES at bedtime  polyethylene glycol 3350 17 Gram(s) Oral daily  senna 2 Tablet(s) Oral at bedtime    MEDICATIONS  (PRN):  benzocaine 15 mG/menthol 3.6 mG (Sugar-Free) Lozenge 1 Lozenge Oral two times a day PRN Sore Throat  bisacodyl Suppository 10 milliGRAM(s) Rectal at bedtime PRN Constipation  fluticasone propionate 50 MICROgram(s)/spray Nasal Spray 1 Spray(s) Both Nostrils two times a day PRN nasal congestion  melatonin 6 milliGRAM(s) Oral at bedtime PRN Insomnia  phenol 1.4% (CHLORASEPTIC) Oral Spray 1 Spray(s) Topical three times a day PRN sore throat

## 2021-09-02 NOTE — PROGRESS NOTE ADULT - SUBJECTIVE AND OBJECTIVE BOX
Patient is a 86y old  Male who presents with a chief complaint of R MCA CVA (02 Sep 2021 12:08)      Patient seen and examined at bedside. No events overnight, no longer having diarrhea. Patient this morning complaining of sore throat that he has been having since attempt from ARSH. Admits to some nasal congestion that he usually has in the mornings, denies fevers/ chills, cough, SOB, chest pain, palpitations, sinus pain. Patient slept well overnight, no other acute concerns at this time.    ALLERGIES:  sulfa drugs (Unknown)    MEDICATIONS  (STANDING):  aspirin enteric coated 81 milliGRAM(s) Oral daily  atorvastatin 80 milliGRAM(s) Oral at bedtime  clopidogrel Tablet 75 milliGRAM(s) Oral daily  heparin   Injectable 5000 Unit(s) SubCutaneous every 8 hours  latanoprost 0.005% Ophthalmic Solution 1 Drop(s) Both EYES at bedtime  polyethylene glycol 3350 17 Gram(s) Oral daily  senna 2 Tablet(s) Oral at bedtime    MEDICATIONS  (PRN):  benzocaine 15 mG/menthol 3.6 mG (Sugar-Free) Lozenge 1 Lozenge Oral two times a day PRN Sore Throat  bisacodyl Suppository 10 milliGRAM(s) Rectal at bedtime PRN Constipation  fluticasone propionate 50 MICROgram(s)/spray Nasal Spray 1 Spray(s) Both Nostrils two times a day PRN nasal congestion  melatonin 6 milliGRAM(s) Oral at bedtime PRN Insomnia  phenol 1.4% (CHLORASEPTIC) Oral Spray 1 Spray(s) Topical three times a day PRN sore throat    Vital Signs Last 24 Hrs  T(F): 98 (02 Sep 2021 07:44), Max: 98 (01 Sep 2021 21:10)  HR: 71 (02 Sep 2021 07:44) (71 - 83)  BP: 112/72 (02 Sep 2021 07:44) (112/72 - 144/84)  RR: 16 (02 Sep 2021 07:44) (15 - 16)  SpO2: 96% (02 Sep 2021 07:44) (95% - 96%)  I&O's Summary    01 Sep 2021 07:01  -  02 Sep 2021 07:00  --------------------------------------------------------  IN: 0 mL / OUT: 450 mL / NET: -450 mL    02 Sep 2021 07:01  -  02 Sep 2021 13:56  --------------------------------------------------------  IN: 0 mL / OUT: 300 mL / NET: -300 mL    PHYSICAL EXAM:  GENERAL: NAD, well-groomed  HEAD:  Atraumatic, Normocephalic  EYES: PERRL, conjunctiva and sclera clear  ENMT: Moist mucous membranes, Good dentition, mild erythema, no exudates, no tonsillar swelling noted  NECK: Supple, No JVD  CHEST/LUNG: Clear to auscultation bilaterally, non-labored breathing, good air entry  HEART: RRR; S1/S2, No murmur  ABDOMEN: Soft, Nontender, Nondistended; Bowel sounds present  VASCULAR: Normal pulses, Normal capillary refill  EXTREMITIES: No cyanosis, No edema  SKIN: Warm, Intact  PSYCH: Normal mood and affect  NERVOUS SYSTEM:  A/O x3, Good concentration; 2-3/5 strength in LUE, 4/5 strength LLE, 5/5 strength RUE and RLE, L facial droop      LABS:                        14.3   12.21 )-----------( 199      ( 02 Sep 2021 05:30 )             40.1     09-02    135  |  103  |  20  ----------------------------<  107  4.2   |  22  |  1.06    Ca    8.6      02 Sep 2021 05:30  Phos  2.7     08-31  Mg     2.2     08-31    TPro  7.1  /  Alb  3.3  /  TBili  0.8  /  DBili  x   /  AST  19  /  ALT  29  /  AlkPhos  72  09-02    eGFR if Non African American: 63 mL/min/1.73M2 (09-02-21 @ 05:30)  eGFR if African American: 73 mL/min/1.73M2 (09-02-21 @ 05:30)            08-29 Chol 124 mg/dL LDL -- HDL 35 mg/dL Trig 78 mg/dL                      COVID-19 PCR: NotDetec (08-31-21 @ 20:57)  COVID-19 PCR: NotDetec (08-28-21 @ 17:30)      RADIOLOGY & ADDITIONAL TESTS: reviewed labs personally    Care Discussed with Consultants/Other Providers: discussed with Dr. Salas

## 2021-09-02 NOTE — PROGRESS NOTE ADULT - PROBLEM SELECTOR PLAN 2
- noted to be elevated however patient afebrile and nontoxic appearing  - continue to monitor fever curve and monitor off abx  - trending downwards  - nasal congestions, started on flonase  - sore throat likely from irritation from ARSH, started phenol spray

## 2021-09-02 NOTE — PROGRESS NOTE ADULT - ASSESSMENT
Patient is a 86 Year old male with a PMHx of HLD, BPH, History of Supraventricular Tachycardia s/p ablation & Osteoarthritis who presented to  ED on 8/28 with L arm weakness and paraesthesia. + TPA. MRI shows right MCA watershed infarct. Admitted  to acute rehabilitation with left UE monoparesis, sensory impairment, dysarthria, mild cognitive deficits, gait and ADL impairments.      #R MCA infarct  - Started on DAPT (Plavix+ Aspirin) & 80mg Atorvastatin as per Cardiology and Neurology.  - Lipid panel wnl except HDL 35, HbA1C 5.6  - Start comprehensive rehab program, PT/OT/SLP 3 hours a day, 5 days a week  - Precautions: cardiac, Aspiration, falls    #HLD   - Continue 80mg Atorvastatin.     #Hypertension   - Monitor BP. al BP<130/80    #Nasal congestion   - PRN Flonase.     #Leukocytosis   - Downtrending   - Continue to monitor.   - Afebrile. VSS     #PREET  -Continue to use CPAP at night.     #GI  - Dulcolax supp prn, Miralax daily, senna 2 tabs at qhs    #  - bladder scan & straight cath as necessary  - Toilet Training     #Diet  - Regular - Carb controlled - DASH/ TLC diet.     #DVT prophylaxis.   - Continue Heparin     # Sleep:  - Melatonin qHS prn    # Skin/Pressure Injury:  - Skin assessment on admission - no pressure ulcers  - Turn every 2 hours while in bed   Patient is a 86 Year old male with a PMHx of HLD, BPH, History of Supraventricular Tachycardia s/p ablation & Osteoarthritis who presented to  ED on 8/28 with L arm weakness and paraesthesia. + TPA. MRI shows right MCA watershed infarct. Admitted  to acute rehabilitation with left UE monoparesis, sensory impairment, dysarthria, mild cognitive deficits, gait and ADL impairments.      #R MCA infarct  - Started on DAPT (Plavix+ Aspirin) & 80mg Atorvastatin as per Cardiology and Neurology.  - Lipid panel wnl except HDL 35, HbA1C 5.6  - Start comprehensive rehab program, PT/OT/SLP 3 hours a day, 5 days a week  - Precautions: cardiac, Aspiration, falls    #HLD   - Continue 80mg Atorvastatin.     #Hypertension   - Monitor BP. al BP<130/80  - today 112/72  - Continue to monitor off meds.     #Nasal congestion   - PRN Flonase.     #Leukocytosis   - Downtrending   - Continue to monitor.   - Afebrile. VSS     #PREET  -Continue to use CPAP at night.     #GI  - Dulcolax supp prn, Miralax daily, senna 2 tabs at qhs    #  - bladder scan & straight cath as necessary  - Toilet Training     #Diet  - Regular - Carb controlled - DASH/ TLC diet.     #DVT prophylaxis.   - Continue Heparin     # Sleep:  - Melatonin qHS prn    # Skin/Pressure Injury:  - Skin assessment on admission - no pressure ulcers  - Turn every 2 hours while in bed   Patient is a 86 Year old male with a PMHx of HLD, BPH, History of Supraventricular Tachycardia s/p ablation & Osteoarthritis who presented to  ED on 8/28 with L arm weakness and paraesthesia. + TPA. MRI shows right MCA watershed infarct. Admitted  to acute rehabilitation with left UE monoparesis, sensory impairment, dysarthria, mild cognitive deficits, gait and ADL impairments.      #R MCA infarct  - Started on DAPT (Plavix+ Aspirin) & 80mg Atorvastatin as per Cardiology and Neurology.  - Lipid panel wnl except HDL 35, HbA1C 5.6  - Start comprehensive rehab program, PT/OT/SLP 3 hours a day, 5 days a week  - Precautions: cardiac, Aspiration, falls    #HLD   - Continue 80mg Atorvastatin.     #Hypertension   - Monitor BP. al BP<130/80  - today 112/72  - Continue to monitor off meds.     #Nasal congestion   - PRN Flonase.     #Leukocytosis   - Downtrending   - Continue to monitor.   - Afebrile. VSS     #PREET  -Continue to use CPAP at night.     #GI  - Dulcolax supp prn, Miralax daily, senna 2 tabs at qhs    #  - bladder scan & straight cath as necessary  - Toilet Training     #Diet  - Regular - Carb controlled - DASH/ TLC diet.     #DVT prophylaxis.   - Continue Heparin     # Sleep:  - Melatonin qHS prn    # Skin/Pressure Injury:  - Skin assessment on admission - no pressure ulcers  - Turn every 2 hours while in bed    #Discharge   - IDR - 9/2  SW - Patient lives in [place] & [place]. Commutes back & forth.   OT - e/g - min A UBD - Min A LBD/ tioleting - Mod A. Shower not accessed yet.   PT - Trans - Min A . Ambulate 10 feet with HC w WC follow with Min A. 2 steps Mod A .   SLP - Horse voice. Mild deficits - memory & recall.   Discharge - 9/21 Patient is a 86 Year old male with a PMHx of HLD, BPH, History of Supraventricular Tachycardia s/p ablation & Osteoarthritis who presented to  ED on 8/28 with L arm weakness and paraesthesia. + TPA. MRI shows right MCA watershed infarct. Admitted  to acute rehabilitation with left UE monoparesis, sensory impairment, dysarthria, mild cognitive deficits, gait and ADL impairments.      #R MCA infarct  - Started on DAPT (Plavix+ Aspirin) & 80mg Atorvastatin as per Cardiology and Neurology.  - Lipid panel wnl except HDL 35, HbA1C 5.6  - cont comprehensive rehab program, PT/OT/SLP 3 hours a day, 5 days a week  - Precautions: cardiac, Aspiration, falls    #HLD   - Continue 80mg Atorvastatin.     #Hypertension   - Monitor BP. al BP<130/80  - today 112/72  - Continue to monitor off meds.     #Nasal congestion/ throat pain  --d/w hospitalist   - PRN Flonase.   -Cepacol PRN    #Leukocytosis   - Downtrending   - Continue to monitor.   - Afebrile. VSS     #PREET  -Continue to use CPAP at night.     #GI  - Dulcolax supp prn, Miralax daily, senna 2 tabs at qhs    #  - continent  --PVR=60    #Diet  - Regular - Carb controlled - DASH/ TLC diet.     #DVT prophylaxis.   - Continue Heparin     # Sleep:  - Melatonin qHS prn    # Skin/Pressure Injury:  - Skin assessment on admission - no pressure ulcers  - Turn every 2 hours while in bed    #Discharge   - IDR - 9/2  SW - Patient lives in [place] & [place]. Commutes back & forth.   OT - e/UBD - min A;  Mod A--grooming/ LBD/ toilet transfer;  Shower not accessed yet.  Goal Mod I-Sup 3 weeks  PT - Trans - Min A . Ambulate 10 feet with HC w WC follow with Min A. 2 steps Mod A . -- Goal Mod I Transf, Sup Amb; CG stairs  SLP - Horse voice. Mild deficits - memory & recall.   Discharge - 9/21

## 2021-09-02 NOTE — PROGRESS NOTE ADULT - PROBLEM SELECTOR PLAN 1
- noted to have acute R MCA infarct on MRI s/p tPA  - CTA neck with R ICA bifurcation with moderate narrowing  - cardio wanted to perform ARSH as cardioembolic cause could not be excluded (moderate narrowing of proximal internal carotid artery unlikely to explain R watershed infarct)  - unable to perform ARSH at  as unable to be passed and therefore TTE bubble study was performed which was negative for PFO  - lipid panel wnl except HDL 35, HbA1C 5.6  - patient to follow up with Dr. Palla, cardio, outpatient for holter/ MCOT monitor  - continue with DAPT with asa/plavix and continue with statin  - continue comprehensive rehab program

## 2021-09-03 ENCOUNTER — NON-APPOINTMENT (OUTPATIENT)
Age: 86
End: 2021-09-03

## 2021-09-03 PROCEDURE — 99232 SBSQ HOSP IP/OBS MODERATE 35: CPT

## 2021-09-03 RX ADMIN — ATORVASTATIN CALCIUM 80 MILLIGRAM(S): 80 TABLET, FILM COATED ORAL at 21:28

## 2021-09-03 RX ADMIN — LATANOPROST 1 DROP(S): 0.05 SOLUTION/ DROPS OPHTHALMIC; TOPICAL at 21:28

## 2021-09-03 RX ADMIN — Medication 1 SPRAY(S): at 21:32

## 2021-09-03 RX ADMIN — CLOPIDOGREL BISULFATE 75 MILLIGRAM(S): 75 TABLET, FILM COATED ORAL at 12:13

## 2021-09-03 RX ADMIN — HEPARIN SODIUM 5000 UNIT(S): 5000 INJECTION INTRAVENOUS; SUBCUTANEOUS at 21:28

## 2021-09-03 RX ADMIN — PANTOPRAZOLE SODIUM 40 MILLIGRAM(S): 20 TABLET, DELAYED RELEASE ORAL at 06:34

## 2021-09-03 RX ADMIN — Medication 81 MILLIGRAM(S): at 12:13

## 2021-09-03 RX ADMIN — Medication 1 SPRAY(S): at 06:37

## 2021-09-03 RX ADMIN — HEPARIN SODIUM 5000 UNIT(S): 5000 INJECTION INTRAVENOUS; SUBCUTANEOUS at 06:34

## 2021-09-03 RX ADMIN — HEPARIN SODIUM 5000 UNIT(S): 5000 INJECTION INTRAVENOUS; SUBCUTANEOUS at 14:15

## 2021-09-03 NOTE — PROGRESS NOTE ADULT - SUBJECTIVE AND OBJECTIVE BOX
HPI:     Patient is a 86 Year old male with a PMHx of HLD, BPH, History of Supraventricular Tachycardia s/p ablation & Osteoarthritis, PREET on home cpap who presented to  ED on 8/28 who woke up from a nap and had L arm weakness and paraesthesia In the ED he had a negative Head CT and was given TPA. MRI of the head was significant for small uncomplicated right MCA watershed infarct. CT angio of brain showed proximal internal carotid artery with at least moderate narrowing but no occlusion. Post TPA Patient continued to have LUE > LLE weakness.   Patient was seen by cardiology who were unable to preform TTE due to probe not being passed successfully. TTE bubble study was negative for any PFO. Also recommended Ecotrin to 81 mg po daily, Plavix 75 mg po daily & Statin. Pt recommended for acute inpatient rehabilitation and was transferred to [facility] on 8/31/2021.     PAST MEDICAL HISTORY:  Benign Prostatic Hypertrophy     Diverticulosis     GERD (Gastroesophageal Reflux Disease)     History of Supraventricular Tachycardia s/p ablation    Hyperlipidemia     Osteoarthritis     Osteoporosis.     PAST SURGICAL HISTORY:  History of Cholecystectomy     History of Prior Ablation Treatment     S/P Cataract Surgery left eye.    Subjective:    Patient seen and examined at bedside.  No events overnight.   Improving strength in the LUE.   Pt slept well overnight as per nursing note.   Denies any pain at this time.   Denies any-other complaints at this time.   Participating in therapy     ICU Vital Signs Last 24 Hrs  T(C): 36.7 (03 Sep 2021 08:16), Max: 36.7 (03 Sep 2021 08:16)  T(F): 98 (03 Sep 2021 08:16), Max: 98 (03 Sep 2021 08:16)  HR: 73 (03 Sep 2021 08:16) (73 - 74)  BP: 178/74 (03 Sep 2021 08:16) (130/77 - 178/74)  BP(mean): --  ABP: --  ABP(mean): --  RR: 14 (03 Sep 2021 08:16) (14 - 15)  SpO2: 96% (03 Sep 2021 08:16) (93% - 96%)        ROS:     Denies any other symptoms.        Physical Exam:   Physical Exam: Constitutional - NAD, Comfortable  HEENT - NCAT, EOMI  Neck - Supple,   Chest - good chest expansion, good respiratory effort, CTAB  Cardio - warm and well perfused, RRR, no murmur. + external cardiac monitor  Abdomen -  Soft, NTND, hypoactive bowel sounds  Extremities - No peripheral edema, No calf tenderness   Neurologic Exam:                    Cognitive -             Orientation: Awake, Alert, AAO to self, place, date, year, situation            Attention:  able to state Days of week backward, Difficulty with serial 7's            Memory: recall 1 of 3 objects without cuing after few mins, 3/3 words with categorical cues.  can name 1 current and 2 past presidents            Thought: somewhat disinhibited     Speech - Fluent, , mild dysarthria, No aphasia      Cranial Nerves - PERRLA, Left eye cataract, left visual field cut, EOMI, left facial droop, tongue midline, +dysarthria, Shoulder shrug intact     Motor -                      LEFT    UE - ShAdd 2/5, EF 2/5, EE 3/5, WE 2/5,  2/5                    RIGHT UE - ShAB 5/5, EF 5/5, EE 5/5, WE 5/5,  WNL  LEFT    LE - HF 4+/5, KE 5/5, DF 5/5, PF 5/5                    RIGHT LE - HF 5/5, KE 5/5, DF 5/5, PF 5/5        Sensory - decreased sensation over left arm compared to right     Reflexes - DTR 2 / 4 , neg Suazo's b/l, neg Babinski's b/l     Coordination - FTN intact on right, FTN impaired Left UE,  HTS intact bilat.      OculoVestibular -  No nystagmus  Psychiatric - Mood stable, Affect WNL                              LABS                        14.3   12.21 )-----------( 199      ( 02 Sep 2021 05:30 )             40.1     09-02    135  |  103  |  20  ----------------------------<  107<H>  4.2   |  22  |  1.06    Ca    8.6      02 Sep 2021 05:30    TPro  7.1  /  Alb  3.3  /  TBili  0.8  /  DBili  x   /  AST  19  /  ALT  29  /  AlkPhos  72  09-02    MEDICATIONS  (STANDING):  aspirin enteric coated 81 milliGRAM(s) Oral daily  atorvastatin 80 milliGRAM(s) Oral at bedtime  clopidogrel Tablet 75 milliGRAM(s) Oral daily  heparin   Injectable 5000 Unit(s) SubCutaneous every 8 hours  latanoprost 0.005% Ophthalmic Solution 1 Drop(s) Both EYES at bedtime  pantoprazole    Tablet 40 milliGRAM(s) Oral before breakfast  polyethylene glycol 3350 17 Gram(s) Oral daily  senna 2 Tablet(s) Oral at bedtime    MEDICATIONS  (PRN):  benzocaine 15 mG/menthol 3.6 mG (Sugar-Free) Lozenge 1 Lozenge Oral two times a day PRN Sore Throat  bisacodyl Suppository 10 milliGRAM(s) Rectal at bedtime PRN Constipation  fluticasone propionate 50 MICROgram(s)/spray Nasal Spray 1 Spray(s) Both Nostrils two times a day PRN nasal congestion  melatonin 6 milliGRAM(s) Oral at bedtime PRN Insomnia  phenol 1.4% (CHLORASEPTIC) Oral Spray 1 Spray(s) Topical three times a day PRN sore throat

## 2021-09-03 NOTE — PROGRESS NOTE ADULT - ASSESSMENT
Patient is a 86 Year old male with a PMHx of HLD, BPH, History of Supraventricular Tachycardia s/p ablation & Osteoarthritis who presented to  ED on 8/28 with L arm weakness and paraesthesia. + TPA. MRI shows right MCA watershed infarct. Admitted  to acute rehabilitation with left UE monoparesis, sensory impairment, dysarthria, mild cognitive deficits, gait and ADL impairments.      #R MCA infarct  - Started on DAPT (Plavix+ Aspirin) & 80mg Atorvastatin as per Cardiology and Neurology.  - Lipid panel wnl except HDL 35, HbA1C 5.6  - cont comprehensive rehab program, PT/OT/SLP 3 hours a day, 5 days a week  - Precautions: cardiac, Aspiration, falls    #HLD   - Continue 80mg Atorvastatin.     #Hiatal hernia   - Continue Protonix 40 mg daily.   - Outpatient follow up for further management.    #Hypertension   - Monitor BP. al BP<130/80  - Today BP - 178/74  - Hospitalist is following.     #Nasal congestion/ throat pain  --d/w hospitalist   - PRN Flonase.   -Cepacol PRN    #Leukocytosis   - Downtrending   - Continue to monitor.   - Afebrile. VSS     #PREET  -Continue to use CPAP at night.     #GI  - Dulcolax supp prn, Miralax daily, senna 2 tabs at qhs    #  - continent  --PVR=60    #Diet  - Regular - Carb controlled - DASH/ TLC diet.     #DVT prophylaxis.   - Continue Heparin     # Sleep:  - Melatonin qHS prn    # Skin/Pressure Injury:  - Skin assessment on admission - no pressure ulcers  - Turn every 2 hours while in bed    #Discharge   - IDR - 9/2  SW - Patient lives in [place] & [place]. Commutes back & forth.   OT - e/UBD - min A;  Mod A--grooming/ LBD/ toilet transfer;  Shower not accessed yet.  Goal Mod I-Sup 3 weeks  PT - Trans - Min A . Ambulate 10 feet with HC w WC follow with Min A. 2 steps Mod A . -- Goal Mod I Transf, Sup Amb; CG stairs  SLP - Horse voice. Mild deficits - memory & recall.   Discharge - 9/21 Patient is a 86 Year old male with a PMHx of HLD, BPH, History of Supraventricular Tachycardia s/p ablation & Osteoarthritis who presented to  ED on 8/28 with L arm weakness and paraesthesia. + TPA. MRI shows right MCA watershed infarct. Admitted  to acute rehabilitation with left UE monoparesis, sensory impairment, dysarthria, mild cognitive deficits, gait and ADL impairments.      #R MCA infarct  - Started on DAPT (Plavix+ Aspirin) & 80mg Atorvastatin as per Cardiology and Neurology.  - Lipid panel wnl except HDL 35, HbA1C 5.6  - cont comprehensive rehab program, PT/OT/SLP 3 hours a day, 5 days a week  - Precautions: cardiac, Aspiration, falls    #HLD   - Continue 80mg Atorvastatin.     #Hiatal hernia   - Continue Protonix 40 mg daily.   - Outpatient follow up for further management.    #Hypertension   - Monitor BP. al BP<130/80  - Today BP - 178/74 --isolated reading -- will monitor  --no reports of uncontrolled BP in therapy today  - Hospitalist is following.     #Nasal congestion/ throat pain  --d/w hospitalist   - PRN Flonase.   -Cepacol PRN    #Leukocytosis   - Downtrending   - Continue to monitor.   - Afebrile. VSS     #PREET  -Continue to use CPAP at night.     #GI  - Dulcolax supp prn, Miralax daily, senna 2 tabs at qhs    #  - continent  --PVR=60    #Diet  - Regular - Carb controlled - DASH/ TLC diet.     #DVT prophylaxis.   - Continue Heparin     # Sleep:  - Melatonin qHS prn    # Skin/Pressure Injury:  - Skin assessment on admission - no pressure ulcers  - Turn every 2 hours while in bed    #Discharge   - IDR - 9/2  SW - Patient lives in [place] & [place]. Commutes back & forth.   OT - e/UBD - min A;  Mod A--grooming/ LBD/ toilet transfer;  Shower not accessed yet.  Goal Mod I-Sup 3 weeks  PT - Trans - Min A . Ambulate 10 feet with HC w WC follow with Min A. 2 steps Mod A . -- Goal Mod I Transf, Sup Amb; CG stairs  SLP - Horse voice. Mild deficits - memory & recall.   Discharge - 9/21

## 2021-09-04 PROCEDURE — 99232 SBSQ HOSP IP/OBS MODERATE 35: CPT

## 2021-09-04 RX ADMIN — ATORVASTATIN CALCIUM 80 MILLIGRAM(S): 80 TABLET, FILM COATED ORAL at 21:04

## 2021-09-04 RX ADMIN — HEPARIN SODIUM 5000 UNIT(S): 5000 INJECTION INTRAVENOUS; SUBCUTANEOUS at 14:16

## 2021-09-04 RX ADMIN — Medication 81 MILLIGRAM(S): at 11:58

## 2021-09-04 RX ADMIN — HEPARIN SODIUM 5000 UNIT(S): 5000 INJECTION INTRAVENOUS; SUBCUTANEOUS at 21:04

## 2021-09-04 RX ADMIN — Medication 1 SPRAY(S): at 06:23

## 2021-09-04 RX ADMIN — LATANOPROST 1 DROP(S): 0.05 SOLUTION/ DROPS OPHTHALMIC; TOPICAL at 21:04

## 2021-09-04 RX ADMIN — BENZOCAINE AND MENTHOL 1 LOZENGE: 5; 1 LIQUID ORAL at 11:59

## 2021-09-04 RX ADMIN — PANTOPRAZOLE SODIUM 40 MILLIGRAM(S): 20 TABLET, DELAYED RELEASE ORAL at 06:23

## 2021-09-04 RX ADMIN — CLOPIDOGREL BISULFATE 75 MILLIGRAM(S): 75 TABLET, FILM COATED ORAL at 11:58

## 2021-09-04 RX ADMIN — HEPARIN SODIUM 5000 UNIT(S): 5000 INJECTION INTRAVENOUS; SUBCUTANEOUS at 06:23

## 2021-09-04 RX ADMIN — Medication 1 SPRAY(S): at 06:24

## 2021-09-04 RX ADMIN — SENNA PLUS 2 TABLET(S): 8.6 TABLET ORAL at 21:04

## 2021-09-04 NOTE — PROGRESS NOTE ADULT - SUBJECTIVE AND OBJECTIVE BOX
Patient is a 86y old  Male who presents with a chief complaint of R MCA CVA (04 Sep 2021 08:24)    Patient seen and examined at bedside. No acute overnight events. Drowsy this morning. Endorses slight right sided headache.     ALLERGIES:  sulfa drugs (Unknown)    MEDICATIONS  (STANDING):  aspirin enteric coated 81 milliGRAM(s) Oral daily  atorvastatin 80 milliGRAM(s) Oral at bedtime  clopidogrel Tablet 75 milliGRAM(s) Oral daily  heparin   Injectable 5000 Unit(s) SubCutaneous every 8 hours  latanoprost 0.005% Ophthalmic Solution 1 Drop(s) Both EYES at bedtime  pantoprazole    Tablet 40 milliGRAM(s) Oral before breakfast  polyethylene glycol 3350 17 Gram(s) Oral daily  senna 2 Tablet(s) Oral at bedtime    MEDICATIONS  (PRN):  benzocaine 15 mG/menthol 3.6 mG (Sugar-Free) Lozenge 1 Lozenge Oral two times a day PRN Sore Throat  bisacodyl Suppository 10 milliGRAM(s) Rectal at bedtime PRN Constipation  fluticasone propionate 50 MICROgram(s)/spray Nasal Spray 1 Spray(s) Both Nostrils two times a day PRN nasal congestion  melatonin 6 milliGRAM(s) Oral at bedtime PRN Insomnia  phenol 1.4% (CHLORASEPTIC) Oral Spray 1 Spray(s) Topical three times a day PRN sore throat    Vital Signs Last 24 Hrs  T(F): 98.2 (04 Sep 2021 09:06), Max: 98.2 (04 Sep 2021 09:06)  HR: 70 (04 Sep 2021 09:06) (70 - 70)  BP: 113/72 (04 Sep 2021 09:06) (113/72 - 124/83)  RR: 14 (04 Sep 2021 09:06) (14 - 15)  SpO2: 95% (04 Sep 2021 09:06) (93% - 95%)  I&O's Summary    03 Sep 2021 07:01  -  04 Sep 2021 07:00  --------------------------------------------------------  IN: 0 mL / OUT: 250 mL / NET: -250 mL    BMI (kg/m2): 25.8 (08-31-21 @ 08:16)    PHYSICAL EXAM:  General: NAD, +facial asymmetry   ENT: MMM, no tonsilar exudate  Neck: Supple, No JVD  Lungs: Clear to auscultation bilaterally, no wheezes. Good air entry bilaterally   Cardio: RRR, S1/S2, No murmurs  Abdomen: Soft, Nontender, Nondistended; Bowel sounds present  Extremities: No calf tenderness, No pitting edema    LABS:                        14.3   12.21 )-----------( 199      ( 02 Sep 2021 05:30 )             40.1       09-02    135  |  103  |  20  ----------------------------<  107  4.2   |  22  |  1.06    Ca    8.6      02 Sep 2021 05:30    TPro  7.1  /  Alb  3.3  /  TBili  0.8  /  DBili  x   /  AST  19  /  ALT  29  /  AlkPhos  72  09-02     eGFR if Non African American: 63 mL/min/1.73M2 (09-02-21 @ 05:30)  eGFR if African American: 73 mL/min/1.73M2 (09-02-21 @ 05:30)    08-29 Chol 124 mg/dL LDL -- HDL 35 mg/dL Trig 78 mg/dL    COVID-19 PCR: NotDetec (08-31-21 @ 20:57)  COVID-19 PCR: NotDetec (08-28-21 @ 17:30)    RADIOLOGY & ADDITIONAL TESTS:     Care Discussed with Consultants/Other Providers:

## 2021-09-04 NOTE — PROGRESS NOTE ADULT - SUBJECTIVE AND OBJECTIVE BOX
No overnight events.  Slept well.  Pain is controlled.   Wants his throat spray, RN aware and able to assist.   No other new ROS.  Has been tolerating rehabilitation program.    VITALS  T(C): 36.6 (09-03-21 @ 20:23), Max: 36.6 (09-03-21 @ 20:23)  HR: 70 (09-03-21 @ 20:23) (70 - 70)  BP: 124/83 (09-03-21 @ 20:23) (124/83 - 124/83)  RR: 15 (09-03-21 @ 20:23) (15 - 15)  SpO2: 93% (09-03-21 @ 20:23) (93% - 93%)  Wt(kg): --     MEDICATIONS   aspirin enteric coated 81 milliGRAM(s) daily  atorvastatin 80 milliGRAM(s) at bedtime  benzocaine 15 mG/menthol 3.6 mG (Sugar-Free) Lozenge 1 Lozenge two times a day PRN  bisacodyl Suppository 10 milliGRAM(s) at bedtime PRN  clopidogrel Tablet 75 milliGRAM(s) daily  fluticasone propionate 50 MICROgram(s)/spray Nasal Spray 1 Spray(s) two times a day PRN  heparin   Injectable 5000 Unit(s) every 8 hours  latanoprost 0.005% Ophthalmic Solution 1 Drop(s) at bedtime  melatonin 6 milliGRAM(s) at bedtime PRN  pantoprazole    Tablet 40 milliGRAM(s) before breakfast  phenol 1.4% (CHLORASEPTIC) Oral Spray 1 Spray(s) three times a day PRN  polyethylene glycol 3350 17 Gram(s) daily  senna 2 Tablet(s) at bedtime      RECENT LABS/IMAGING                     ------------------------------------------  PHYSICAL EXAM  Constitutional - NAD, Comfortable  Pulm - Breathing comfortably, No wheezing  Abd - Nondistended  Extremities - No calf tenderness  Neurologic Exam - Awake, Alert  Psychiatric - Mood WNL     ASSESSMENT/PLAN  86y Male with impairments in mobility and ADLs   - Continue current rehabilitation program 3hrs a day   - Continue current medications, patient is medically stable   - DVT prophylaxis  - Skin - OOB and mobilization daily

## 2021-09-05 PROCEDURE — 99232 SBSQ HOSP IP/OBS MODERATE 35: CPT

## 2021-09-05 RX ADMIN — HEPARIN SODIUM 5000 UNIT(S): 5000 INJECTION INTRAVENOUS; SUBCUTANEOUS at 14:55

## 2021-09-05 RX ADMIN — LATANOPROST 1 DROP(S): 0.05 SOLUTION/ DROPS OPHTHALMIC; TOPICAL at 21:04

## 2021-09-05 RX ADMIN — Medication 81 MILLIGRAM(S): at 12:01

## 2021-09-05 RX ADMIN — Medication 1 SPRAY(S): at 06:36

## 2021-09-05 RX ADMIN — PANTOPRAZOLE SODIUM 40 MILLIGRAM(S): 20 TABLET, DELAYED RELEASE ORAL at 06:28

## 2021-09-05 RX ADMIN — HEPARIN SODIUM 5000 UNIT(S): 5000 INJECTION INTRAVENOUS; SUBCUTANEOUS at 06:28

## 2021-09-05 RX ADMIN — POLYETHYLENE GLYCOL 3350 17 GRAM(S): 17 POWDER, FOR SOLUTION ORAL at 12:02

## 2021-09-05 RX ADMIN — CLOPIDOGREL BISULFATE 75 MILLIGRAM(S): 75 TABLET, FILM COATED ORAL at 12:01

## 2021-09-05 RX ADMIN — HEPARIN SODIUM 5000 UNIT(S): 5000 INJECTION INTRAVENOUS; SUBCUTANEOUS at 21:04

## 2021-09-05 RX ADMIN — ATORVASTATIN CALCIUM 80 MILLIGRAM(S): 80 TABLET, FILM COATED ORAL at 21:04

## 2021-09-05 RX ADMIN — SENNA PLUS 2 TABLET(S): 8.6 TABLET ORAL at 21:04

## 2021-09-05 NOTE — PROGRESS NOTE ADULT - SUBJECTIVE AND OBJECTIVE BOX
In bright spirits.  Moved to a private room. No overnight events.  Slept OK.  Pain is controlled.   No other new ROS.  Has been tolerating rehabilitation program.    VITALS  T(C): 36.8 (09-04-21 @ 19:31), Max: 36.8 (09-04-21 @ 09:06)  HR: 70 (09-04-21 @ 19:31) (70 - 70)  BP: 130/75 (09-04-21 @ 19:31) (113/72 - 130/75)  RR: 15 (09-04-21 @ 19:31) (14 - 15)  SpO2: 97% (09-04-21 @ 19:31) (95% - 97%)  Wt(kg): --     MEDICATIONS   aspirin enteric coated 81 milliGRAM(s) daily  atorvastatin 80 milliGRAM(s) at bedtime  benzocaine 15 mG/menthol 3.6 mG (Sugar-Free) Lozenge 1 Lozenge two times a day PRN  bisacodyl Suppository 10 milliGRAM(s) at bedtime PRN  clopidogrel Tablet 75 milliGRAM(s) daily  fluticasone propionate 50 MICROgram(s)/spray Nasal Spray 1 Spray(s) two times a day PRN  heparin   Injectable 5000 Unit(s) every 8 hours  latanoprost 0.005% Ophthalmic Solution 1 Drop(s) at bedtime  melatonin 6 milliGRAM(s) at bedtime PRN  pantoprazole    Tablet 40 milliGRAM(s) before breakfast  phenol 1.4% (CHLORASEPTIC) Oral Spray 1 Spray(s) three times a day PRN  polyethylene glycol 3350 17 Gram(s) daily  senna 2 Tablet(s) at bedtime      RECENT LABS/IMAGING                     ------------------------------------------  PHYSICAL EXAM  Constitutional - NAD, Comfortable  Pulm - Breathing comfortably, No wheezing  Abd - Nondistended  Extremities - No calf tenderness  Neurologic Exam - Awake, Alert  Psychiatric - Mood WNL     ASSESSMENT/PLAN  86y Male with impairments in mobility and ADLs   - Continue current rehabilitation program 3hrs a day   - Continue current medications, patient is medically stable   - DVT prophylaxis  - Skin - OOB and mobilization daily

## 2021-09-06 LAB
ALBUMIN SERPL ELPH-MCNC: 3.3 G/DL — SIGNIFICANT CHANGE UP (ref 3.3–5)
ALP SERPL-CCNC: 81 U/L — SIGNIFICANT CHANGE UP (ref 40–120)
ALT FLD-CCNC: 97 U/L — HIGH (ref 10–45)
ANION GAP SERPL CALC-SCNC: 10 MMOL/L — SIGNIFICANT CHANGE UP (ref 5–17)
AST SERPL-CCNC: 60 U/L — HIGH (ref 10–40)
BILIRUB SERPL-MCNC: 0.6 MG/DL — SIGNIFICANT CHANGE UP (ref 0.2–1.2)
BUN SERPL-MCNC: 17 MG/DL — SIGNIFICANT CHANGE UP (ref 7–23)
CALCIUM SERPL-MCNC: 9.3 MG/DL — SIGNIFICANT CHANGE UP (ref 8.4–10.5)
CHLORIDE SERPL-SCNC: 102 MMOL/L — SIGNIFICANT CHANGE UP (ref 96–108)
CO2 SERPL-SCNC: 25 MMOL/L — SIGNIFICANT CHANGE UP (ref 22–31)
CREAT SERPL-MCNC: 1 MG/DL — SIGNIFICANT CHANGE UP (ref 0.5–1.3)
GLUCOSE SERPL-MCNC: 112 MG/DL — HIGH (ref 70–99)
HCT VFR BLD CALC: 40.2 % — SIGNIFICANT CHANGE UP (ref 39–50)
HGB BLD-MCNC: 14.2 G/DL — SIGNIFICANT CHANGE UP (ref 13–17)
MCHC RBC-ENTMCNC: 33.4 PG — SIGNIFICANT CHANGE UP (ref 27–34)
MCHC RBC-ENTMCNC: 35.3 GM/DL — SIGNIFICANT CHANGE UP (ref 32–36)
MCV RBC AUTO: 94.6 FL — SIGNIFICANT CHANGE UP (ref 80–100)
NRBC # BLD: 0 /100 WBCS — SIGNIFICANT CHANGE UP (ref 0–0)
PLATELET # BLD AUTO: 245 K/UL — SIGNIFICANT CHANGE UP (ref 150–400)
POTASSIUM SERPL-MCNC: 4.5 MMOL/L — SIGNIFICANT CHANGE UP (ref 3.5–5.3)
POTASSIUM SERPL-SCNC: 4.5 MMOL/L — SIGNIFICANT CHANGE UP (ref 3.5–5.3)
PROT SERPL-MCNC: 7.2 G/DL — SIGNIFICANT CHANGE UP (ref 6–8.3)
RBC # BLD: 4.25 M/UL — SIGNIFICANT CHANGE UP (ref 4.2–5.8)
RBC # FLD: 13 % — SIGNIFICANT CHANGE UP (ref 10.3–14.5)
SODIUM SERPL-SCNC: 137 MMOL/L — SIGNIFICANT CHANGE UP (ref 135–145)
WBC # BLD: 8.97 K/UL — SIGNIFICANT CHANGE UP (ref 3.8–10.5)
WBC # FLD AUTO: 8.97 K/UL — SIGNIFICANT CHANGE UP (ref 3.8–10.5)

## 2021-09-06 PROCEDURE — 99232 SBSQ HOSP IP/OBS MODERATE 35: CPT

## 2021-09-06 RX ORDER — ENOXAPARIN SODIUM 100 MG/ML
40 INJECTION SUBCUTANEOUS DAILY
Refills: 0 | Status: DISCONTINUED | OUTPATIENT
Start: 2021-09-06 | End: 2021-09-06

## 2021-09-06 RX ORDER — SIMETHICONE 80 MG/1
80 TABLET, CHEWABLE ORAL THREE TIMES A DAY
Refills: 0 | Status: DISCONTINUED | OUTPATIENT
Start: 2021-09-06 | End: 2021-09-17

## 2021-09-06 RX ORDER — ENOXAPARIN SODIUM 100 MG/ML
40 INJECTION SUBCUTANEOUS AT BEDTIME
Refills: 0 | Status: DISCONTINUED | OUTPATIENT
Start: 2021-09-06 | End: 2021-09-17

## 2021-09-06 RX ADMIN — SENNA PLUS 2 TABLET(S): 8.6 TABLET ORAL at 21:43

## 2021-09-06 RX ADMIN — Medication 1 SPRAY(S): at 09:09

## 2021-09-06 RX ADMIN — ATORVASTATIN CALCIUM 80 MILLIGRAM(S): 80 TABLET, FILM COATED ORAL at 21:43

## 2021-09-06 RX ADMIN — LATANOPROST 1 DROP(S): 0.05 SOLUTION/ DROPS OPHTHALMIC; TOPICAL at 21:43

## 2021-09-06 RX ADMIN — CLOPIDOGREL BISULFATE 75 MILLIGRAM(S): 75 TABLET, FILM COATED ORAL at 11:42

## 2021-09-06 RX ADMIN — Medication 81 MILLIGRAM(S): at 11:42

## 2021-09-06 RX ADMIN — Medication 100 MILLIGRAM(S): at 11:42

## 2021-09-06 RX ADMIN — POLYETHYLENE GLYCOL 3350 17 GRAM(S): 17 POWDER, FOR SOLUTION ORAL at 11:42

## 2021-09-06 RX ADMIN — Medication 1 SPRAY(S): at 21:43

## 2021-09-06 RX ADMIN — Medication 100 MILLIGRAM(S): at 21:43

## 2021-09-06 RX ADMIN — PANTOPRAZOLE SODIUM 40 MILLIGRAM(S): 20 TABLET, DELAYED RELEASE ORAL at 06:36

## 2021-09-06 RX ADMIN — ENOXAPARIN SODIUM 40 MILLIGRAM(S): 100 INJECTION SUBCUTANEOUS at 21:43

## 2021-09-06 RX ADMIN — HEPARIN SODIUM 5000 UNIT(S): 5000 INJECTION INTRAVENOUS; SUBCUTANEOUS at 06:36

## 2021-09-06 NOTE — PROGRESS NOTE ADULT - SUBJECTIVE AND OBJECTIVE BOX
No overnight events.  Slept well.  Assisted with hearing aides.  Pain is controlled.   No other new ROS.  Has been tolerating rehabilitation program.    VITALS  T(C): 36.3 (09-06-21 @ 08:05), Max: 36.7 (09-05-21 @ 12:06)  HR: 72 (09-06-21 @ 08:05) (68 - 74)  BP: 114/77 (09-06-21 @ 08:05) (102/69 - 117/76)  RR: 14 (09-06-21 @ 08:05) (14 - 15)  SpO2: 95% (09-06-21 @ 08:05) (95% - 97%)  Wt(kg): --     MEDICATIONS   aspirin enteric coated 81 milliGRAM(s) daily  atorvastatin 80 milliGRAM(s) at bedtime  benzocaine 15 mG/menthol 3.6 mG (Sugar-Free) Lozenge 1 Lozenge two times a day PRN  bisacodyl Suppository 10 milliGRAM(s) at bedtime PRN  clopidogrel Tablet 75 milliGRAM(s) daily  fluticasone propionate 50 MICROgram(s)/spray Nasal Spray 1 Spray(s) two times a day PRN  heparin   Injectable 5000 Unit(s) every 8 hours  latanoprost 0.005% Ophthalmic Solution 1 Drop(s) at bedtime  melatonin 6 milliGRAM(s) at bedtime PRN  pantoprazole    Tablet 40 milliGRAM(s) before breakfast  phenol 1.4% (CHLORASEPTIC) Oral Spray 1 Spray(s) three times a day PRN  polyethylene glycol 3350 17 Gram(s) daily  senna 2 Tablet(s) at bedtime      RECENT LABS/IMAGING                        14.2   8.97  )-----------( 245      ( 06 Sep 2021 06:29 )             40.2     09-06    137  |  102  |  17  ----------------------------<  112<H>  4.5   |  25  |  1.00    Ca    9.3      06 Sep 2021 06:29    TPro  7.2  /  Alb  3.3  /  TBili  0.6  /  DBili  x   /  AST  60<H>  /  ALT  97<H>  /  AlkPhos  81  09-06               ------------------------------------------  PHYSICAL EXAM  Constitutional - NAD, Comfortable  Pulm - Breathing comfortably, No wheezing  Abd - Nondistended  Extremities - No calf tenderness  Neurologic Exam - Awake, Alert  Psychiatric - Mood WNL     ASSESSMENT/PLAN  86y Male with impairments in mobility and ADLs   - Continue current rehabilitation program 3hrs a day   - Continue current medications, patient is medically stable   - DVT prophylaxis  - Skin - OOB and mobilization daily   - Monitor LFTs - appreciate IM input

## 2021-09-07 LAB — SARS-COV-2 RNA SPEC QL NAA+PROBE: SIGNIFICANT CHANGE UP

## 2021-09-07 PROCEDURE — 99233 SBSQ HOSP IP/OBS HIGH 50: CPT

## 2021-09-07 RX ADMIN — ENOXAPARIN SODIUM 40 MILLIGRAM(S): 100 INJECTION SUBCUTANEOUS at 21:27

## 2021-09-07 RX ADMIN — POLYETHYLENE GLYCOL 3350 17 GRAM(S): 17 POWDER, FOR SOLUTION ORAL at 11:16

## 2021-09-07 RX ADMIN — Medication 1 SPRAY(S): at 16:27

## 2021-09-07 RX ADMIN — Medication 1 SPRAY(S): at 06:10

## 2021-09-07 RX ADMIN — Medication 100 MILLIGRAM(S): at 16:27

## 2021-09-07 RX ADMIN — Medication 100 MILLIGRAM(S): at 06:10

## 2021-09-07 RX ADMIN — CLOPIDOGREL BISULFATE 75 MILLIGRAM(S): 75 TABLET, FILM COATED ORAL at 11:16

## 2021-09-07 RX ADMIN — SENNA PLUS 2 TABLET(S): 8.6 TABLET ORAL at 21:27

## 2021-09-07 RX ADMIN — PANTOPRAZOLE SODIUM 40 MILLIGRAM(S): 20 TABLET, DELAYED RELEASE ORAL at 06:10

## 2021-09-07 RX ADMIN — Medication 81 MILLIGRAM(S): at 11:16

## 2021-09-07 RX ADMIN — ATORVASTATIN CALCIUM 80 MILLIGRAM(S): 80 TABLET, FILM COATED ORAL at 21:27

## 2021-09-07 RX ADMIN — LATANOPROST 1 DROP(S): 0.05 SOLUTION/ DROPS OPHTHALMIC; TOPICAL at 21:27

## 2021-09-07 RX ADMIN — Medication 1 SPRAY(S): at 21:28

## 2021-09-07 NOTE — PROGRESS NOTE ADULT - ASSESSMENT
Patient is a 86 Year old male with a PMHx of HLD, BPH, History of Supraventricular Tachycardia s/p ablation & Osteoarthritis who presented to  ED on 8/28 with L arm weakness and paraesthesia. + TPA. MRI shows right MCA watershed infarct. Admitted  to acute rehabilitation with left UE monoparesis, sensory impairment, dysarthria, mild cognitive deficits, gait and ADL impairments.      #R MCA infarct  - Continue DAPT per cardio and neuro recs  - Continue atorvastatin  - Lipid panel wnl except HDL 35, HbA1C 5.6  - cont comprehensive rehab program, PT/OT/SLP 3 hours a day, 5 days a week  - Precautions: cardiac, Aspiration, falls    #HLD   - Continue 80mg Atorvastatin.     #Hiatal hernia   - Continue Protonix 40 mg daily.   - Outpatient follow up for further management.    #Hypertension   - Monitor BP. al BP<130/80  --no reports of uncontrolled BP per flow sheet  - Hospitalist is following    #Nasal congestion/ throat pain  - PRN Flonase.   -Cepacol PRN    #Leukocytosis   - Leukocytosis resolved  - Continue to monitor.   - Afebrile. VSS     #PREET  -Continue to use CPAP at night.     #GI  - Dulcolax supp prn, Miralax daily, senna 2 tabs at qhs    #  - continent  --PVR=60    #Diet  - Regular - Carb controlled - DASH/ TLC diet.     #DVT prophylaxis.   - Continue Heparin     # Sleep:  - Melatonin qHS prn    # Skin/Pressure Injury:  - Skin assessment on admission - no pressure ulcers  - Turn every 2 hours while in bed    #Discharge   - IDR - 9/2  SW - Patient lives in [place] & [place]. Commutes back & forth.   OT - e/UBD - min A;  Mod A--grooming/ LBD/ toilet transfer;  Shower not accessed yet.  Goal Mod I-Sup 3 weeks  PT - Trans - Min A . Ambulate 10 feet with HC w WC follow with Min A. 2 steps Mod A . -- Goal Mod I Transf, Sup Amb; CG stairs  SLP - Horse voice. Mild deficits - memory & recall.   Discharge - 9/21

## 2021-09-07 NOTE — PROGRESS NOTE ADULT - SUBJECTIVE AND OBJECTIVE BOX
HPI:     Patient is a 86 Year old male with a PMHx of HLD, BPH, History of Supraventricular Tachycardia s/p ablation & Osteoarthritis, PREET on home cpap who presented to  ED on 8/28 who woke up from a nap and had L arm weakness and paraesthesia In the ED he had a negative Head CT and was given TPA. MRI of the head was significant for small uncomplicated right MCA watershed infarct. CT angio of brain showed proximal internal carotid artery with at least moderate narrowing but no occlusion. Post TPA Patient continued to have LUE > LLE weakness.   Patient was seen by cardiology who were unable to preform TTE due to probe not being passed successfully. TTE bubble study was negative for any PFO. Also recommended Ecotrin to 81 mg po daily, Plavix 75 mg po daily & Statin. Pt recommended for acute inpatient rehabilitation and was transferred to [facility] on 8/31/2021.       Subjective:    Patient seen and examined at bedside. No overnight events. Patient notes improved strength in LUE. Has been sleeping well as per nursing notes. Patient feels well, denies any pain or complaints at this time. Has been tolerating therapy.      PAST MEDICAL HISTORY:  Benign Prostatic Hypertrophy     Diverticulosis     GERD (Gastroesophageal Reflux Disease)     History of Supraventricular Tachycardia s/p ablation    Hyperlipidemia     Osteoarthritis     Osteoporosis.     PAST SURGICAL HISTORY:  History of Cholecystectomy     History of Prior Ablation Treatment     S/P Cataract Surgery left eye.        Vital Signs Last 24 Hrs  T(C): 36.4 (06 Sep 2021 21:29), Max: 36.4 (06 Sep 2021 21:29)  T(F): 97.5 (06 Sep 2021 21:29), Max: 97.5 (06 Sep 2021 21:29)  HR: 60 (07 Sep 2021 08:25) (60 - 73)  BP: 121/78 (07 Sep 2021 08:25) (117/73 - 121/78)  BP(mean): --  RR: 16 (07 Sep 2021 08:25) (15 - 16)  SpO2: 94% (07 Sep 2021 08:25) (94% - 95%)        ROS:     As per subjective      Physical Exam:   Physical Exam: Constitutional - NAD, Comfortable, sitting upright in chair  HEENT - NCAT, EOMI  Neck - Supple  Chest - good chest expansion, good respiratory effort, CTAB  Cardio - warm and well perfused, RRR, no murmur. + external cardiac monitor  Abdomen -  Soft, NTND  Extremities - No peripheral edema, No calf tenderness   Neurologic Exam:                    Cognitive -             Orientation: Awake, Alert, AAO to self, place, date, year, situation            Attention:  able to state Days of week backward, Difficulty with serial 7's            Memory: recall 1 of 3 objects without cuing after few mins, 3/3 words with categorical cues.  can name 1 current and 2 past presidents            Thought: somewhat disinhibited     Speech - Fluent, mild dysarthria, No aphasia      Cranial Nerves - PERRLA, Left eye cataract, left visual field cut, EOMI, left facial droop, tongue midline, +dysarthria, Shoulder shrug intact     Motor -                      LEFT    UE - ShAdd 2/5, EF 2/5, EE 3/5, WE 2/5,  2/5                    RIGHT UE - ShAB 5/5, EF 5/5, EE 5/5, WE 5/5,  WNL  LEFT    LE - HF 4+/5, KE 5/5, DF 5/5, PF 5/5                    RIGHT LE - HF 5/5, KE 5/5, DF 5/5, PF 5/5        Sensory - decreased sensation over left arm compared to right     Reflexes - DTR 2 / 4 , neg Suazo's b/l, neg Babinski's b/l     Coordination - FTN intact on right, FTN impaired Left UE,  HTS intact bilat.      OculoVestibular -  No nystagmus  Psychiatric - Mood stable, Affect WNL                              RECENT LABS/IMAGING                          14.2   8.97  )-----------( 245      ( 06 Sep 2021 06:29 )             40.2     09-06    137  |  102  |  17  ----------------------------<  112<H>  4.5   |  25  |  1.00    Ca    9.3      06 Sep 2021 06:29    TPro  7.2  /  Alb  3.3  /  TBili  0.6  /  DBili  x   /  AST  60<H>  /  ALT  97<H>  /  AlkPhos  81  09-06                                14.2   8.97  )-----------( 245      ( 06 Sep 2021 06:29 )             40.2     09-06    137  |  102  |  17  ----------------------------<  112<H>  4.5   |  25  |  1.00    Ca    9.3      06 Sep 2021 06:29    TPro  7.2  /  Alb  3.3  /  TBili  0.6  /  DBili  x   /  AST  60<H>  /  ALT  97<H>  /  AlkPhos  81  09-06            MEDICATIONS  (STANDING):  aspirin enteric coated 81 milliGRAM(s) Oral daily  atorvastatin 80 milliGRAM(s) Oral at bedtime  clopidogrel Tablet 75 milliGRAM(s) Oral daily  heparin   Injectable 5000 Unit(s) SubCutaneous every 8 hours  latanoprost 0.005% Ophthalmic Solution 1 Drop(s) Both EYES at bedtime  pantoprazole    Tablet 40 milliGRAM(s) Oral before breakfast  polyethylene glycol 3350 17 Gram(s) Oral daily  senna 2 Tablet(s) Oral at bedtime    MEDICATIONS  (PRN):  benzocaine 15 mG/menthol 3.6 mG (Sugar-Free) Lozenge 1 Lozenge Oral two times a day PRN Sore Throat  bisacodyl Suppository 10 milliGRAM(s) Rectal at bedtime PRN Constipation  fluticasone propionate 50 MICROgram(s)/spray Nasal Spray 1 Spray(s) Both Nostrils two times a day PRN nasal congestion  melatonin 6 milliGRAM(s) Oral at bedtime PRN Insomnia  phenol 1.4% (CHLORASEPTIC) Oral Spray 1 Spray(s) Topical three times a day PRN sore throat   HPI:     Patient is a 86 Year old male with a PMHx of HLD, BPH, History of Supraventricular Tachycardia s/p ablation & Osteoarthritis, PREET on home cpap who presented to  ED on 8/28 who woke up from a nap and had L arm weakness and paraesthesia In the ED he had a negative Head CT and was given TPA. MRI of the head was significant for small uncomplicated right MCA watershed infarct. CT angio of brain showed proximal internal carotid artery with at least moderate narrowing but no occlusion. Post TPA Patient continued to have LUE > LLE weakness.   Patient was seen by cardiology who were unable to preform TTE due to probe not being passed successfully. TTE bubble study was negative for any PFO. Also recommended Ecotrin to 81 mg po daily, Plavix 75 mg po daily & Statin. Pt recommended for acute inpatient rehabilitation and was transferred to [facility] on 8/31/2021.       Subjective:    Patient seen and examined at bedside. No overnight events. Patient notes improved strength in LUE. Has been sleeping well as per nursing notes. Patient feels well, denies any pain or complaints at this time. Has been tolerating therapy.      PAST MEDICAL HISTORY:  Benign Prostatic Hypertrophy     Diverticulosis     GERD (Gastroesophageal Reflux Disease)     History of Supraventricular Tachycardia s/p ablation    Hyperlipidemia     Osteoarthritis     Osteoporosis.     PAST SURGICAL HISTORY:  History of Cholecystectomy     History of Prior Ablation Treatment     S/P Cataract Surgery left eye.        Vital Signs Last 24 Hrs  T(C): 36.4 (06 Sep 2021 21:29), Max: 36.4 (06 Sep 2021 21:29)  T(F): 97.5 (06 Sep 2021 21:29), Max: 97.5 (06 Sep 2021 21:29)  HR: 60 (07 Sep 2021 08:25) (60 - 73)  BP: 121/78 (07 Sep 2021 08:25) (117/73 - 121/78)  BP(mean): --  RR: 16 (07 Sep 2021 08:25) (15 - 16)  SpO2: 94% (07 Sep 2021 08:25) (94% - 95%)        ROS:     As per subjective      Physical Exam:   Physical Exam: Constitutional - NAD, Comfortable, sitting upright in chair  HEENT - NCAT, EOMI  Neck - Supple  Chest - good chest expansion, good respiratory effort, CTA, no wheezing, rales, or rhonchi  Cardio - warm and well perfused, RRR, no murmur. + external cardiac monitor  Abdomen -  Soft, NTND  Extremities - No peripheral edema, No calf tenderness   Neurologic Exam: AAOx3, neuro stable                Psychiatric - Mood stable, Affect WNL                              RECENT LABS/IMAGING                          14.2   8.97  )-----------( 245      ( 06 Sep 2021 06:29 )             40.2     09-06    137  |  102  |  17  ----------------------------<  112<H>  4.5   |  25  |  1.00    Ca    9.3      06 Sep 2021 06:29    TPro  7.2  /  Alb  3.3  /  TBili  0.6  /  DBili  x   /  AST  60<H>  /  ALT  97<H>  /  AlkPhos  81  09-06                                14.2   8.97  )-----------( 245      ( 06 Sep 2021 06:29 )             40.2     09-06    137  |  102  |  17  ----------------------------<  112<H>  4.5   |  25  |  1.00    Ca    9.3      06 Sep 2021 06:29    TPro  7.2  /  Alb  3.3  /  TBili  0.6  /  DBili  x   /  AST  60<H>  /  ALT  97<H>  /  AlkPhos  81  09-06            MEDICATIONS  (STANDING):  aspirin enteric coated 81 milliGRAM(s) Oral daily  atorvastatin 80 milliGRAM(s) Oral at bedtime  clopidogrel Tablet 75 milliGRAM(s) Oral daily  heparin   Injectable 5000 Unit(s) SubCutaneous every 8 hours  latanoprost 0.005% Ophthalmic Solution 1 Drop(s) Both EYES at bedtime  pantoprazole    Tablet 40 milliGRAM(s) Oral before breakfast  polyethylene glycol 3350 17 Gram(s) Oral daily  senna 2 Tablet(s) Oral at bedtime    MEDICATIONS  (PRN):  benzocaine 15 mG/menthol 3.6 mG (Sugar-Free) Lozenge 1 Lozenge Oral two times a day PRN Sore Throat  bisacodyl Suppository 10 milliGRAM(s) Rectal at bedtime PRN Constipation  fluticasone propionate 50 MICROgram(s)/spray Nasal Spray 1 Spray(s) Both Nostrils two times a day PRN nasal congestion  melatonin 6 milliGRAM(s) Oral at bedtime PRN Insomnia  phenol 1.4% (CHLORASEPTIC) Oral Spray 1 Spray(s) Topical three times a day PRN sore throat

## 2021-09-08 PROCEDURE — 99233 SBSQ HOSP IP/OBS HIGH 50: CPT

## 2021-09-08 RX ADMIN — CLOPIDOGREL BISULFATE 75 MILLIGRAM(S): 75 TABLET, FILM COATED ORAL at 11:36

## 2021-09-08 RX ADMIN — ATORVASTATIN CALCIUM 80 MILLIGRAM(S): 80 TABLET, FILM COATED ORAL at 22:25

## 2021-09-08 RX ADMIN — Medication 81 MILLIGRAM(S): at 11:37

## 2021-09-08 RX ADMIN — POLYETHYLENE GLYCOL 3350 17 GRAM(S): 17 POWDER, FOR SOLUTION ORAL at 11:36

## 2021-09-08 RX ADMIN — SIMETHICONE 80 MILLIGRAM(S): 80 TABLET, CHEWABLE ORAL at 11:37

## 2021-09-08 RX ADMIN — Medication 1 SPRAY(S): at 06:36

## 2021-09-08 RX ADMIN — PANTOPRAZOLE SODIUM 40 MILLIGRAM(S): 20 TABLET, DELAYED RELEASE ORAL at 06:36

## 2021-09-08 RX ADMIN — Medication 1 SPRAY(S): at 22:25

## 2021-09-08 RX ADMIN — ENOXAPARIN SODIUM 40 MILLIGRAM(S): 100 INJECTION SUBCUTANEOUS at 22:25

## 2021-09-08 RX ADMIN — LATANOPROST 1 DROP(S): 0.05 SOLUTION/ DROPS OPHTHALMIC; TOPICAL at 22:25

## 2021-09-08 NOTE — PROGRESS NOTE ADULT - SUBJECTIVE AND OBJECTIVE BOX
HPI:  Patient is a 86 Year old male with a PMHx of HLD, BPH, History of Supraventricular Tachycardia s/p ablation & Osteoarthritis, PREET on home cpap who presented to  ED on 8/28 who woke up from a nap and had L arm weakness and paraesthesia In the ED he had a negative Head CT and was given TPA. MRI of the head was significant for small uncomplicated right MCA watershed infarct. CT angio of brain showed proximal internal carotid artery with at least moderate narrowing but no occlusion. Post TPA Patient continued to have LUE > LLE weakness.   Patient was seen by cardiology who were unable to preform TTE due to probe not being passed successfully. TTE bubble study was negative for any PFO. Also recommended Ecotrin to 81 mg po daily, Plavix 75 mg po daily & Statin. Pt recommended for acute inpatient rehabilitation and was transferred to [facility] on 8/31/2021.            (31 Aug 2021 15:52)      PAST MEDICAL & SURGICAL HISTORY:  GERD (Gastroesophageal Reflux Disease)    Hyperlipidemia    Benign Prostatic Hypertrophy    Diverticulosis    Osteoarthritis    Osteoporosis    History of Supraventricular Tachycardia  s/p ablation    History of Cholecystectomy    History of Prior Ablation Treatment    S/P Cataract Surgery  left eye      Subjective:  seen in OT.  shoulder pain improving.   cough improved.  Pt. c/o gas discomfort to hospitalist.       VITALS  Vital Signs Last 24 Hrs  T(C): 36.6 (08 Sep 2021 08:19), Max: 36.6 (08 Sep 2021 08:19)  T(F): 97.8 (08 Sep 2021 08:19), Max: 97.8 (08 Sep 2021 08:19)  HR: 75 (08 Sep 2021 08:19) (71 - 75)  BP: 112/71 (08 Sep 2021 08:19) (112/71 - 121/74)  BP(mean): --  RR: 16 (08 Sep 2021 08:19) (15 - 16)  SpO2: 97% (08 Sep 2021 08:19) (97% - 98%)    REVIEW OF SYMPTOMS  Neurological deficits-- left sided weakness    Physical Exam:   Constitutional - NAD, Comfortable, sitting upright in chair  HEENT - NCAT, EOMI  Neck - Supple  Chest - good chest expansion, good respiratory effort,  no wheezing, rales, or rhonchi  Cardio - warm and well perfused, RRR, no murmur. + external cardiac monitor  Abdomen -  Soft, NTND  Extremities - No peripheral edema, No calf tenderness   Neurologic Exam: AAOx3, neuro stable--Left UE shoulder 3/5, elbow, wrist and fingers 4/5.  Coordination impaired.                Psychiatric - Mood stable, Affect WNL    RECENT LABS                  RADIOLOGY/OTHER RESULTS      MEDICATIONS  (STANDING):  aspirin enteric coated 81 milliGRAM(s) Oral daily  atorvastatin 80 milliGRAM(s) Oral at bedtime  clopidogrel Tablet 75 milliGRAM(s) Oral daily  enoxaparin Injectable 40 milliGRAM(s) SubCutaneous at bedtime  latanoprost 0.005% Ophthalmic Solution 1 Drop(s) Both EYES at bedtime  pantoprazole    Tablet 40 milliGRAM(s) Oral before breakfast  polyethylene glycol 3350 17 Gram(s) Oral daily  senna 2 Tablet(s) Oral at bedtime    MEDICATIONS  (PRN):  benzocaine 15 mG/menthol 3.6 mG (Sugar-Free) Lozenge 1 Lozenge Oral two times a day PRN Sore Throat  benzonatate 100 milliGRAM(s) Oral three times a day PRN Cough  bisacodyl Suppository 10 milliGRAM(s) Rectal at bedtime PRN Constipation  fluticasone propionate 50 MICROgram(s)/spray Nasal Spray 1 Spray(s) Both Nostrils two times a day PRN nasal congestion  melatonin 6 milliGRAM(s) Oral at bedtime PRN Insomnia  phenol 1.4% (CHLORASEPTIC) Oral Spray 1 Spray(s) Topical three times a day PRN sore throat  simethicone 80 milliGRAM(s) Chew three times a day PRN Gas

## 2021-09-08 NOTE — PROGRESS NOTE ADULT - ASSESSMENT
Patient is a 86 Year old male with a PMHx of HLD, BPH, History of Supraventricular Tachycardia s/p ablation & Osteoarthritis who presented to  ED on 8/28 with L arm weakness and paraesthesia. + TPA. MRI shows right MCA watershed infarct. Admitted  to acute rehabilitation with left UE monoparesis, sensory impairment, dysarthria, mild cognitive deficits, gait and ADL impairments.      #R MCA infarct  - Continue DAPT per cardio and neuro recs  - Continue atorvastatin  - Lipid panel wnl except HDL 35, HbA1C 5.6  - cont comprehensive rehab program, PT/OT/SLP 3 hours a day, 5 days a week  - Precautions: cardiac, Aspiration, falls    #HLD   - Continue 80mg Atorvastatin.     #Hiatal hernia   - Continue Protonix 40 mg daily.   --d/w hospitalist  --daughter will f/u with patients outpatient GI     #Hypertension   -BP controlled off medications  - Hospitalist is following    #Nasal congestion/ throat pain  - PRN Flonase.   -Cepacol PRN      #PREET  -Continue to use CPAP at night.     #GI  - Dulcolax supp prn, Miralax daily, senna 2 tabs at qhs    #  - continent  --PVR=60    #Diet  - Regular - Carb controlled - DASH/ TLC diet.     #DVT prophylaxis.   - Continue Heparin     # Sleep:  - Melatonin qHS prn    # Skin/Pressure Injury:  - Skin assessment on admission - no pressure ulcers  - Turn every 2 hours while in bed    #Discharge   - IDR - 9/2  SW - Patient lives in [place] & [place]. Commutes back & forth.   OT - e/UBD - min A;  Mod A--grooming/ LBD/ toilet transfer;  Shower not accessed yet.  Goal Mod I-Sup 3 weeks  PT - Trans - Min A . Ambulate 10 feet with HC w WC follow with Min A. 2 steps Mod A . -- Goal Mod I Transf, Sup Amb; CG stairs  SLP - Horse voice. Mild deficits - memory & recall.   Discharge - 9/21    spoke with pt's OT and PT  and received updates on pt's function-- He is CG/CS for mobility, ADLs improving-- min A dressing.  sup grooming.  OT working on scapular mobilization and visual scanning.  Speech notes reviewed-- pt. doing visual scanning tasks with 71% accuracy, and mental manipulation with 40% accuracy, and vocal exercises for dysphonia.    ** Spoke with pt's Daughter, Jordyn Hanna__, to provide update on pt's status,  medical update, progress in therapy, general prognosis and discussed that pt's discharge can be changed to earlier sometime between 9/15 to 9/17.  Discussed family training prior.  Jordyn Hanna would like to discuss with pt's girlfriend to figure a plan of care after discharge.  Pt. will have supervision/ assistance at home from family.  All questions answered.

## 2021-09-08 NOTE — CHART NOTE - NSCHARTNOTEFT_GEN_A_CORE
Nutrition Follow Up Note  Hospital Course (Per Electronic Medical Record): 86 Year old male with a PMHx of HLD, BPH, History of Supraventricular Tachycardia s/p ablation & Osteoarthritis who presented to  ED on 8/28 with L arm weakness and paraesthesia. + TPA. MRI shows right MCA watershed infarct. Admitted  to acute rehabilitation with left UE monoparesis, sensory impairment, dysarthria, mild cognitive deficits, gait and ADL impairments.      Source: Medical Record [X] Patient [X] Family [ ]         Diet: Regular, DASH/TLC  Pt reports good PO intake/appetite. Pt noted to be gassy and hx of constipation, reports this has been going on for 4-5 months and had a workup for it. Discussed adequate fiber and fluid intake. Pt receptive to adding prunes to diet for additional fiber.    Enteral/Parenteral Nutrition: N/A    Current Weight: 175.4 lbs (9/8) weight fluctuations noted - recommend zeroing bed scale when pt OOB  187.6 lbs (9/6)  176.3 lbs (9/5)    Pertinent Medications: MEDICATIONS  (STANDING):  aspirin enteric coated 81 milliGRAM(s) Oral daily  atorvastatin 80 milliGRAM(s) Oral at bedtime  clopidogrel Tablet 75 milliGRAM(s) Oral daily  enoxaparin Injectable 40 milliGRAM(s) SubCutaneous at bedtime  latanoprost 0.005% Ophthalmic Solution 1 Drop(s) Both EYES at bedtime  pantoprazole    Tablet 40 milliGRAM(s) Oral before breakfast  polyethylene glycol 3350 17 Gram(s) Oral daily  senna 2 Tablet(s) Oral at bedtime    MEDICATIONS  (PRN):  benzocaine 15 mG/menthol 3.6 mG (Sugar-Free) Lozenge 1 Lozenge Oral two times a day PRN Sore Throat  benzonatate 100 milliGRAM(s) Oral three times a day PRN Cough  bisacodyl Suppository 10 milliGRAM(s) Rectal at bedtime PRN Constipation  fluticasone propionate 50 MICROgram(s)/spray Nasal Spray 1 Spray(s) Both Nostrils two times a day PRN nasal congestion  melatonin 6 milliGRAM(s) Oral at bedtime PRN Insomnia  phenol 1.4% (CHLORASEPTIC) Oral Spray 1 Spray(s) Topical three times a day PRN sore throat  simethicone 80 milliGRAM(s) Chew three times a day PRN Gas      Pertinent Labs:  09-06 Na137 mmol/L Glu 112 mg/dL<H> K+ 4.5 mmol/L Cr  1.00 mg/dL BUN 17 mg/dL 09-06 Alb 3.3 g/dL 08-29 Chol 124 mg/dL LDL --    HDL 35 mg/dL<L> Trig 78 mg/dL        Skin: skin tear per nursing flow sheets    Edema: No edema per nursing flow sheets     Last BM: on 9/6 per nursing flow sheets    Estimated Needs:   [X] No Change since Previous Assessment  [ ] Recalculated:     Previous Nutrition Diagnosis:   Overweight/Obesity    Nutrition Diagnosis is [X] Ongoing    [ ] Resolved   [ ] Not Applicable      New Nutrition Diagnosis: [X] Not Applicable  [ ] Inadequate Protein Energy Intake   [ ] Inadequate Oral Intake   [ ] Excessive Energy Intake   [ ] Increased Nutrient Needs   [ ] Obesity   [ ] Altered GI Function   [ ] Unintended Weight Loss   [ ] Food & Nutrition Related Knowledge Deficit  [ ] Limited Adherence to nutrition related recommendations   [ ] Malnutrition      Interventions:   1. Recommend continuing with current plan of care    Monitoring & Evaluation:   [X] Weights   [X] PO Intake   [X] Follow Up (Per Protocol)  [X] Tolerance to Diet Prescription   [X] Other: Labs     RD Remains Available.  Mehnaz Portillo RD

## 2021-09-09 DIAGNOSIS — N17.9 ACUTE KIDNEY FAILURE, UNSPECIFIED: ICD-10-CM

## 2021-09-09 DIAGNOSIS — R74.01 ELEVATION OF LEVELS OF LIVER TRANSAMINASE LEVELS: ICD-10-CM

## 2021-09-09 LAB
ALBUMIN SERPL ELPH-MCNC: 3.3 G/DL — SIGNIFICANT CHANGE UP (ref 3.3–5)
ALP SERPL-CCNC: 83 U/L — SIGNIFICANT CHANGE UP (ref 40–120)
ALT FLD-CCNC: 89 U/L — HIGH (ref 10–45)
ANION GAP SERPL CALC-SCNC: 6 MMOL/L — SIGNIFICANT CHANGE UP (ref 5–17)
AST SERPL-CCNC: 41 U/L — HIGH (ref 10–40)
BILIRUB SERPL-MCNC: 0.3 MG/DL — SIGNIFICANT CHANGE UP (ref 0.2–1.2)
BUN SERPL-MCNC: 19 MG/DL — SIGNIFICANT CHANGE UP (ref 7–23)
CALCIUM SERPL-MCNC: 8.8 MG/DL — SIGNIFICANT CHANGE UP (ref 8.4–10.5)
CHLORIDE SERPL-SCNC: 101 MMOL/L — SIGNIFICANT CHANGE UP (ref 96–108)
CO2 SERPL-SCNC: 30 MMOL/L — SIGNIFICANT CHANGE UP (ref 22–31)
CREAT SERPL-MCNC: 1.18 MG/DL — SIGNIFICANT CHANGE UP (ref 0.5–1.3)
GLUCOSE SERPL-MCNC: 112 MG/DL — HIGH (ref 70–99)
HCT VFR BLD CALC: 41.7 % — SIGNIFICANT CHANGE UP (ref 39–50)
HGB BLD-MCNC: 14.6 G/DL — SIGNIFICANT CHANGE UP (ref 13–17)
MCHC RBC-ENTMCNC: 34.1 PG — HIGH (ref 27–34)
MCHC RBC-ENTMCNC: 35 GM/DL — SIGNIFICANT CHANGE UP (ref 32–36)
MCV RBC AUTO: 97.4 FL — SIGNIFICANT CHANGE UP (ref 80–100)
NRBC # BLD: 0 /100 WBCS — SIGNIFICANT CHANGE UP (ref 0–0)
PLATELET # BLD AUTO: 288 K/UL — SIGNIFICANT CHANGE UP (ref 150–400)
POTASSIUM SERPL-MCNC: 4.3 MMOL/L — SIGNIFICANT CHANGE UP (ref 3.5–5.3)
POTASSIUM SERPL-SCNC: 4.3 MMOL/L — SIGNIFICANT CHANGE UP (ref 3.5–5.3)
PROT SERPL-MCNC: 7.2 G/DL — SIGNIFICANT CHANGE UP (ref 6–8.3)
RBC # BLD: 4.28 M/UL — SIGNIFICANT CHANGE UP (ref 4.2–5.8)
RBC # FLD: 12.5 % — SIGNIFICANT CHANGE UP (ref 10.3–14.5)
SODIUM SERPL-SCNC: 137 MMOL/L — SIGNIFICANT CHANGE UP (ref 135–145)
WBC # BLD: 9.63 K/UL — SIGNIFICANT CHANGE UP (ref 3.8–10.5)
WBC # FLD AUTO: 9.63 K/UL — SIGNIFICANT CHANGE UP (ref 3.8–10.5)

## 2021-09-09 PROCEDURE — 99232 SBSQ HOSP IP/OBS MODERATE 35: CPT

## 2021-09-09 RX ADMIN — Medication 1 SPRAY(S): at 06:35

## 2021-09-09 RX ADMIN — SIMETHICONE 80 MILLIGRAM(S): 80 TABLET, CHEWABLE ORAL at 11:00

## 2021-09-09 RX ADMIN — ATORVASTATIN CALCIUM 80 MILLIGRAM(S): 80 TABLET, FILM COATED ORAL at 21:48

## 2021-09-09 RX ADMIN — CLOPIDOGREL BISULFATE 75 MILLIGRAM(S): 75 TABLET, FILM COATED ORAL at 11:46

## 2021-09-09 RX ADMIN — Medication 100 MILLIGRAM(S): at 21:49

## 2021-09-09 RX ADMIN — Medication 81 MILLIGRAM(S): at 11:46

## 2021-09-09 RX ADMIN — LATANOPROST 1 DROP(S): 0.05 SOLUTION/ DROPS OPHTHALMIC; TOPICAL at 21:49

## 2021-09-09 RX ADMIN — ENOXAPARIN SODIUM 40 MILLIGRAM(S): 100 INJECTION SUBCUTANEOUS at 21:48

## 2021-09-09 RX ADMIN — PANTOPRAZOLE SODIUM 40 MILLIGRAM(S): 20 TABLET, DELAYED RELEASE ORAL at 06:35

## 2021-09-09 RX ADMIN — Medication 100 MILLIGRAM(S): at 11:00

## 2021-09-09 NOTE — PROGRESS NOTE ADULT - SUBJECTIVE AND OBJECTIVE BOX
Patient is a 86y old  Male who presents with a chief complaint of R MCA CVA (08 Sep 2021 15:32)      Patient seen and examined at bedside.  No overnight events  No complaints this morning    ALLERGIES:  sulfa drugs (Unknown)    MEDICATIONS  (STANDING):  aspirin enteric coated 81 milliGRAM(s) Oral daily  atorvastatin 80 milliGRAM(s) Oral at bedtime  clopidogrel Tablet 75 milliGRAM(s) Oral daily  enoxaparin Injectable 40 milliGRAM(s) SubCutaneous at bedtime  latanoprost 0.005% Ophthalmic Solution 1 Drop(s) Both EYES at bedtime  pantoprazole    Tablet 40 milliGRAM(s) Oral before breakfast  polyethylene glycol 3350 17 Gram(s) Oral daily  senna 2 Tablet(s) Oral at bedtime    MEDICATIONS  (PRN):  benzocaine 15 mG/menthol 3.6 mG (Sugar-Free) Lozenge 1 Lozenge Oral two times a day PRN Sore Throat  benzonatate 100 milliGRAM(s) Oral three times a day PRN Cough  bisacodyl Suppository 10 milliGRAM(s) Rectal at bedtime PRN Constipation  fluticasone propionate 50 MICROgram(s)/spray Nasal Spray 1 Spray(s) Both Nostrils two times a day PRN nasal congestion  melatonin 6 milliGRAM(s) Oral at bedtime PRN Insomnia  phenol 1.4% (CHLORASEPTIC) Oral Spray 1 Spray(s) Topical three times a day PRN sore throat  simethicone 80 milliGRAM(s) Chew three times a day PRN Gas    Vital Signs Last 24 Hrs  T(F): 97.3 (08 Sep 2021 22:24), Max: 97.3 (08 Sep 2021 22:24)  HR: 77 (09 Sep 2021 08:46) (70 - 77)  BP: 132/77 (09 Sep 2021 08:46) (119/71 - 132/77)  RR: 16 (09 Sep 2021 08:46) (16 - 16)  SpO2: 97% (09 Sep 2021 08:46) (96% - 97%)  I&O's Summary        PHYSICAL EXAM:  GENERAL: NAD  HENT:  Atraumatic, Normocephalic; No tonsillar erythema, exudates, or enlargement; Moist mucous membranes;   EYES: EOMI, PERRLA, conjunctiva and sclera clear, no lid-lag  NECK: Supple, No JVD, Normal thyroid  CHEST/LUNG: Clear to percussion bilaterally; No rales, rhonchi, wheezing, or rubs; normal respiratory effort, no intercostal retractions  HEART: Regular rate and rhythm; No murmurs, rubs, or gallops; No pitting edema  ABDOMEN: Soft, Nontender, Nondistended; Bowel sounds present; No HSM  MUSCULOSKELETAL/EXTREMITIES:  2+ Peripheral Pulses, No clubbing or digital cyanosis  PSYCH: Appropriate affect, Alert & Awake    LABS:                        14.6   9.63  )-----------( 288      ( 09 Sep 2021 06:30 )             41.7       09-09    137  |  101  |  19  ----------------------------<  112  4.3   |  30  |  1.18    Ca    8.8      09 Sep 2021 06:30    TPro  7.2  /  Alb  3.3  /  TBili  0.3  /  DBili  x   /  AST  41  /  ALT  89  /  AlkPhos  83  09-09     eGFR if Non African American: 56 mL/min/1.73M2 (09-09-21 @ 06:30)  eGFR if : 65 mL/min/1.73M2 (09-09-21 @ 06:30)      08-29 Chol 124 mg/dL LDL -- HDL 35 mg/dL Trig 78 mg/dL        COVID-19 PCR: NotDetec (09-07-21 @ 06:10)  COVID-19 PCR: NotDetec (08-31-21 @ 20:57)  COVID-19 PCR: NotDetec (08-28-21 @ 17:30)      Care Discussed with Rehab Attending and Other Providers

## 2021-09-09 NOTE — PROGRESS NOTE ADULT - SUBJECTIVE AND OBJECTIVE BOX
Patient is a 86y old  Male who presents with a chief complaint of R MCA CVA (09 Sep 2021 11:35)      HPI:  Patient is a 86 Year old male with a PMHx of HLD, BPH, History of Supraventricular Tachycardia s/p ablation & Osteoarthritis, PREET on home cpap who presented to  ED on 8/28 who woke up from a nap and had L arm weakness and paraesthesia In the ED he had a negative Head CT and was given TPA. MRI of the head was significant for small uncomplicated right MCA watershed infarct. CT angio of brain showed proximal internal carotid artery with at least moderate narrowing but no occlusion. Post TPA Patient continued to have LUE > LLE weakness.   Patient was seen by cardiology who were unable to preform TTE due to probe not being passed successfully. TTE bubble study was negative for any PFO. Also recommended Ecotrin to 81 mg po daily, Plavix 75 mg po daily & Statin. Pt recommended for acute inpatient rehabilitation and was transferred to [facility] on 8/31/2021.            (31 Aug 2021 15:52)        SUBJECTIVE: Patient seen and examined. No acute overnight events, slept well. Patient notes a continued dry cough. Denies any sputum, fevers, chills, SOB, dyspnea, orthopnea. He also notes intermittent dyspepsia. Otherwise he feels well this AM. Tolerating therapy. No other complaints.       REVIEW OF SYMPTOMS  Neurological deficits-- left sided weakness. (+) dry cough, dyspepsia  Denies any dizziness, CP, n/v, abdominal pain      VITALS  86y  Vital Signs Last 24 Hrs  T(C): 36.3 (08 Sep 2021 22:24), Max: 36.3 (08 Sep 2021 22:24)  T(F): 97.3 (08 Sep 2021 22:24), Max: 97.3 (08 Sep 2021 22:24)  HR: 77 (09 Sep 2021 08:46) (70 - 77)  BP: 132/77 (09 Sep 2021 08:46) (119/71 - 132/77)  BP(mean): --  RR: 16 (09 Sep 2021 08:46) (16 - 16)  SpO2: 97% (09 Sep 2021 08:46) (96% - 97%)  Daily     Daily         Physical Exam:   Constitutional - NAD, Comfortable, sitting upright in chair  HEENT - NCAT, EOMI  Neck - Supple  Chest - good chest expansion, good respiratory effort. CTA b/l, no wheezing, rales, or rhonchi  Cardio - warm and well perfused, RRR, no murmur. + external cardiac monitor  Abdomen -  Soft, NTND  Extremities - No peripheral edema, No calf tenderness   Neurologic Exam: AAOx3, neuro stable--Left UE shoulder 3/5, elbow, wrist and fingers 4/5.  Coordination impaired.                Psychiatric - Mood stable, Affect WNL      FUNCTIONAL STATUS:        RECENT LABS:                        14.6   9.63  )-----------( 288      ( 09 Sep 2021 06:30 )             41.7     09-09    137  |  101  |  19  ----------------------------<  112<H>  4.3   |  30  |  1.18    Ca    8.8      09 Sep 2021 06:30    TPro  7.2  /  Alb  3.3  /  TBili  0.3  /  DBili  x   /  AST  41<H>  /  ALT  89<H>  /  AlkPhos  83  09-09    LIVER FUNCTIONS - ( 09 Sep 2021 06:30 )  Alb: 3.3 g/dL / Pro: 7.2 g/dL / ALK PHOS: 83 U/L / ALT: 89 U/L / AST: 41 U/L / GGT: x                   CAPILLARY BLOOD GLUCOSE            MEDICATIONS:  MEDICATIONS  (STANDING):  aspirin enteric coated 81 milliGRAM(s) Oral daily  atorvastatin 80 milliGRAM(s) Oral at bedtime  clopidogrel Tablet 75 milliGRAM(s) Oral daily  enoxaparin Injectable 40 milliGRAM(s) SubCutaneous at bedtime  latanoprost 0.005% Ophthalmic Solution 1 Drop(s) Both EYES at bedtime  pantoprazole    Tablet 40 milliGRAM(s) Oral before breakfast  polyethylene glycol 3350 17 Gram(s) Oral daily  senna 2 Tablet(s) Oral at bedtime    MEDICATIONS  (PRN):  benzocaine 15 mG/menthol 3.6 mG (Sugar-Free) Lozenge 1 Lozenge Oral two times a day PRN Sore Throat  benzonatate 100 milliGRAM(s) Oral three times a day PRN Cough  bisacodyl Suppository 10 milliGRAM(s) Rectal at bedtime PRN Constipation  fluticasone propionate 50 MICROgram(s)/spray Nasal Spray 1 Spray(s) Both Nostrils two times a day PRN nasal congestion  guaiFENesin Oral Liquid (Sugar-Free) 100 milliGRAM(s) Oral every 6 hours PRN Cough  melatonin 6 milliGRAM(s) Oral at bedtime PRN Insomnia  phenol 1.4% (CHLORASEPTIC) Oral Spray 1 Spray(s) Topical three times a day PRN sore throat  simethicone 80 milliGRAM(s) Chew three times a day PRN Gas

## 2021-09-09 NOTE — PROGRESS NOTE ADULT - ASSESSMENT
Patient is a 86 Year old male with a PMHx of HLD, BPH, History of Supraventricular Tachycardia s/p ablation & Osteoarthritis who presented to  ED on 8/28 with L arm weakness and paraesthesia. + TPA. MRI shows right MCA watershed infarct. Admitted  to acute rehabilitation with left UE monoparesis, sensory impairment, dysarthria, mild cognitive deficits, gait and ADL impairments.      #R MCA infarct  - Continue DAPT per cardio and neuro recs  - Continue atorvastatin  - Lipid panel wnl except HDL 35, HbA1C 5.6  - cont comprehensive rehab program, PT/OT/SLP 3 hours a day, 5 days a week  - Precautions: cardiac, Aspiration, falls    #Cough  - Continue tessalon perle 100mg TID PRN  - Robitussin added PRN    #HLD   - Continue 80mg Atorvastatin.     #Hiatal hernia   - Continue Protonix 40 mg daily.   --d/w hospitalist  --daughter will f/u with patients outpatient GI     #Hypertension   -BP controlled off medications  - Hospitalist is following    #Nasal congestion/ throat pain  - PRN Flonase.   -Cepacol PRN    #PREET  -Continue to use CPAP at night.     #GI  - Dulcolax supp prn, Miralax daily, senna 2 tabs at qhs  - Simethicone PRN dyspepsia    #  - continent  --PVR=60    #Diet  - Regular - Carb controlled - DASH/ TLC diet.     #DVT prophylaxis.   - Continue Heparin     # Sleep:  - Melatonin qHS prn    # Skin/Pressure Injury:  - Skin assessment on admission - no pressure ulcers  - Turn every 2 hours while in bed    #Discharge   - IDR - 9/9  SW - Patient lives in [place] & [place]. Commutes back & forth.   OT - e/g - setup supervision; UBD - min A;  Mod A--LBD. Toileting min A/supervision; Goal Mod I-Sup 3 weeks  PT - Trans - CG/Min A . Ambulate with straight cane w close supervision. 12 steps w/ 1HR, CG/assist -- Goal Mod I Transf, Sup Amb; CG stairs  SLP - Horse voice. Mild deficits - memory & recall. L visual field difficulties. Reduced cognition. Regular/thin liquid diet  Discharge - 9/16    spoke with pt's OT and PT  and received updates on pt's function-- He is CG/CS for mobility, ADLs improving-- min A dressing.  sup grooming.  OT working on scapular mobilization and visual scanning.  Speech notes reviewed-- pt. doing visual scanning tasks with 71% accuracy, and mental manipulation with 40% accuracy, and vocal exercises for dysphonia.    ** Spoke with pt's Daughter, Jordyn Hanna__, to provide update on pt's status,  medical update, progress in therapy, general prognosis and discussed that pt's discharge can be changed to earlier sometime between 9/15 to 9/17.  Discussed family training prior.  Jordyn Hanna would like to discuss with pt's girlfriend to figure a plan of care after discharge.  Pt. will have supervision/ assistance at home from family.  All questions answered.    Patient is a 86 Year old male with a PMHx of HLD, BPH, History of Supraventricular Tachycardia s/p ablation & Osteoarthritis who presented to  ED on 8/28 with L arm weakness and paraesthesia. + TPA. MRI shows right MCA watershed infarct. Admitted  to acute rehabilitation with left UE monoparesis, sensory impairment, dysarthria, mild cognitive deficits, gait and ADL impairments.      #R MCA infarct  - Continue DAPT per cardio and neuro recs  - Continue atorvastatin  - Lipid panel wnl except HDL 35, HbA1C 5.6  - cont comprehensive rehab program, PT/OT/SLP 3 hours a day, 5 days a week  - Precautions: cardiac, Aspiration, falls    #Cough  - Continue tessalon perle 100mg TID PRN  - Robitussin added PRN    #HLD   - Continue 80mg Atorvastatin.     #Hiatal hernia   - Continue Protonix 40 mg daily.   --d/w hospitalist  --daughter will f/u with patients outpatient GI     #Hypertension   -BP controlled off medications  - Hospitalist is following    #Nasal congestion/ throat pain  - PRN Flonase.   -Cepacol PRN    #PREET  -Continue to use CPAP at night.     #GI  - Dulcolax supp prn, Miralax daily, senna 2 tabs at qhs  - Simethicone PRN dyspepsia    #  - continent  --PVR=60    #Diet  - Regular - Carb controlled - DASH/ TLC diet.     #DVT prophylaxis.   - Continue Heparin     # Sleep:  - Melatonin qHS prn    # Skin/Pressure Injury:  - Skin assessment on admission - no pressure ulcers  - Turn every 2 hours while in bed    #Discharge   - IDR - 9/9  SW - Patient lives in [place] & [place]. Commutes back & forth.   OT - e - setup supervision; g/dressing - min A;  CG--bath/toil/shower transf. Goal Mod I-Sup 3 weeks  PT - Trans - CG . Ambulate 100ft with straight cane w close supervision. 12 steps w/ 1HR,-- CG/assist -- Goal Mod I Transf, Sup Amb; CG stairs  SLP - Horse voice. Mild deficits - memory & recall. L visual field difficulties. Reduced cognition. Regular/thin liquid diet, decreased abstract reasoning, working memory, tangential.   Discharge - 9/16

## 2021-09-09 NOTE — PROGRESS NOTE ADULT - PROBLEM SELECTOR PLAN 1
- Acute R MCA infarct on MRI s/p tPA  - CTA neck with R ICA bifurcation with moderate narrowing  - cardio wanted to perform ARSH as cardioembolic cause could not be excluded (moderate narrowing of proximal internal carotid artery unlikely to explain R watershed infarct)  - unable to perform ARSH at  as unable to be passed and therefore TTE bubble study was performed which was negative for PFO  - lipid panel wnl except HDL 35, HbA1C 5.6  - patient to follow up with Dr. Palla, cardio, outpatient for holter/ MCOT monitor  - continue with DAPT with asa/plavix and continue with statin  - continue comprehensive rehab program

## 2021-09-10 PROCEDURE — 99233 SBSQ HOSP IP/OBS HIGH 50: CPT

## 2021-09-10 RX ORDER — MOMETASONE FUROATE 220 UG/1
1 INHALANT RESPIRATORY (INHALATION) DAILY
Refills: 0 | Status: COMPLETED | OUTPATIENT
Start: 2021-09-10 | End: 2021-09-17

## 2021-09-10 RX ORDER — PANTOPRAZOLE SODIUM 20 MG/1
40 TABLET, DELAYED RELEASE ORAL EVERY 12 HOURS
Refills: 0 | Status: DISCONTINUED | OUTPATIENT
Start: 2021-09-10 | End: 2021-09-15

## 2021-09-10 RX ORDER — ALBUTEROL 90 UG/1
2 AEROSOL, METERED ORAL EVERY 6 HOURS
Refills: 0 | Status: DISCONTINUED | OUTPATIENT
Start: 2021-09-10 | End: 2021-09-17

## 2021-09-10 RX ADMIN — LATANOPROST 1 DROP(S): 0.05 SOLUTION/ DROPS OPHTHALMIC; TOPICAL at 21:23

## 2021-09-10 RX ADMIN — Medication 100 MILLIGRAM(S): at 05:41

## 2021-09-10 RX ADMIN — ATORVASTATIN CALCIUM 80 MILLIGRAM(S): 80 TABLET, FILM COATED ORAL at 21:23

## 2021-09-10 RX ADMIN — Medication 81 MILLIGRAM(S): at 11:22

## 2021-09-10 RX ADMIN — PANTOPRAZOLE SODIUM 40 MILLIGRAM(S): 20 TABLET, DELAYED RELEASE ORAL at 05:42

## 2021-09-10 RX ADMIN — CLOPIDOGREL BISULFATE 75 MILLIGRAM(S): 75 TABLET, FILM COATED ORAL at 11:22

## 2021-09-10 RX ADMIN — ENOXAPARIN SODIUM 40 MILLIGRAM(S): 100 INJECTION SUBCUTANEOUS at 21:22

## 2021-09-10 RX ADMIN — PANTOPRAZOLE SODIUM 40 MILLIGRAM(S): 20 TABLET, DELAYED RELEASE ORAL at 17:12

## 2021-09-10 NOTE — PROGRESS NOTE ADULT - ASSESSMENT
Patient is a 86 Year old male with a PMHx of HLD, BPH, History of Supraventricular Tachycardia s/p ablation & Osteoarthritis who presented to  ED on 8/28 with L arm weakness and paraesthesia. + TPA. MRI shows right MCA watershed infarct. Admitted  to acute rehabilitation with left UE monoparesis, sensory impairment, dysarthria, mild cognitive deficits, gait and ADL impairments.      #R MCA infarct  - Continue DAPT per cardio and neuro recs  - Continue atorvastatin  - Lipid panel wnl except HDL 35, HbA1C 5.6  - cont comprehensive rehab program, PT/OT/SLP 3 hours a day, 5 days a week  - Precautions: cardiac, Aspiration, falls    #Cough  - Continue tessalon perle 100mg TID PRN  - Robitussin added PRN but not improving  --d/w hospitalist to f/u    #HLD   - Continue 80mg Atorvastatin.     #Hiatal hernia   - Continue Protonix 40 mg daily.   --d/w hospitalist--simethicone not helping--will assess  --daughter will f/u with patients outpatient GI     #Hypertension   -BP controlled off medications  - Hospitalist is following    #Nasal congestion/ throat pain  - PRN Flonase.   -Cepacol PRN    #PREET  -Continue to use CPAP at night.     #GI  - Dulcolax supp prn, Miralax daily, senna 2 tabs at qhs  - Simethicone PRN dyspepsia    #  - continent  --PVR=60    #Diet  - Regular - Carb controlled - DASH/ TLC diet.     #DVT prophylaxis.   - Continue Heparin     # Sleep:  - Melatonin qHS prn    # Skin/Pressure Injury:  - Skin assessment on admission - no pressure ulcers  - Turn every 2 hours while in bed    #Discharge   - IDR - 9/9  SW - Patient lives in [place] & [place]. Commutes back & forth.   OT - e - setup supervision; g/dressing - min A;  CG--bath/toil/shower transf. Goal Mod I-Sup 3 weeks  PT - Trans - CG . Ambulate 100ft with straight cane w close supervision. 12 steps w/ 1HR,-- CG/assist -- Goal Mod I Transf, Sup Amb; CG stairs  SLP - Horse voice. Mild deficits - memory & recall. L visual field difficulties. Reduced cognition. Regular/thin liquid diet, decreased abstract reasoning, working memory, tangential.   Discharge - 9/16       Patient is a 86 Year old male with a PMHx of HLD, BPH, History of Supraventricular Tachycardia s/p ablation & Osteoarthritis who presented to  ED on 8/28 with L arm weakness and paraesthesia. + TPA. MRI shows right MCA watershed infarct. Admitted  to acute rehabilitation with left UE monoparesis, sensory impairment, dysarthria, mild cognitive deficits, gait and ADL impairments.      #R MCA infarct  - Continue DAPT per cardio and neuro recs  - Continue atorvastatin  - Lipid panel wnl except HDL 35, HbA1C 5.6  - cont comprehensive rehab program, PT/OT/SLP 3 hours a day, 5 days a week  - Precautions: cardiac, Aspiration, falls    #Cough  - Continue tessalon perle 100mg TID PRN  - Robitussin added PRN but not improving  --d/w hospitalist to f/u--added albuterol inhaler PRN    #HLD   - Continue 80mg Atorvastatin.     #Hiatal hernia--dyspepsia   -simethicone not helping-  -increase Protonix 40 mg BID  --ordered stool for H. Pylori-- daughter states pt's GI did not mention that this has been r/o  --d/w hospitalist-     #Hypertension   -BP controlled off medications  - Hospitalist is following    #Nasal congestion/ throat pain  - PRN Flonase.   -Cepacol PRN    #PREET  -Continue to use CPAP at night.     #GI  - Dulcolax supp prn, Miralax daily, senna 2 tabs at qhs  - Simethicone PRN dyspepsia    #  - continent  --PVR=60    #Diet  - Regular - Carb controlled - DASH/ TLC diet.     #DVT prophylaxis.   - Continue Heparin     # Sleep:  - Melatonin qHS prn    # Skin/Pressure Injury:  - Skin assessment on admission - no pressure ulcers  - Turn every 2 hours while in bed    #Discharge   - IDR - 9/9  SW - Patient lives in [place] & [place]. Commutes back & forth.   OT - e - setup supervision; g/dressing - min A;  CG--bath/toil/shower transf. Goal Mod I-Sup 3 weeks  PT - Trans - CG . Ambulate 100ft with straight cane w close supervision. 12 steps w/ 1HR,-- CG/assist -- Goal Mod I Transf, Sup Amb; CG stairs  SLP - Horse voice. Mild deficits - memory & recall. L visual field difficulties. Reduced cognition. Regular/thin liquid diet, decreased abstract reasoning, working memory, tangential.   Discharge - 9/16

## 2021-09-10 NOTE — PROGRESS NOTE ADULT - SUBJECTIVE AND OBJECTIVE BOX
HPI:  Patient is a 86 Year old male with a PMHx of HLD, BPH, History of Supraventricular Tachycardia s/p ablation & Osteoarthritis, PREET on home cpap who presented to  ED on 8/28 who woke up from a nap and had L arm weakness and paraesthesia In the ED he had a negative Head CT and was given TPA. MRI of the head was significant for small uncomplicated right MCA watershed infarct. CT angio of brain showed proximal internal carotid artery with at least moderate narrowing but no occlusion. Post TPA Patient continued to have LUE > LLE weakness.   Patient was seen by cardiology who were unable to preform TTE due to probe not being passed successfully. TTE bubble study was negative for any PFO. Also recommended Ecotrin to 81 mg po daily, Plavix 75 mg po daily & Statin. Pt recommended for acute inpatient rehabilitation and was transferred to [facility] on 8/31/2021.            (31 Aug 2021 15:52)      PAST MEDICAL & SURGICAL HISTORY:  GERD (Gastroesophageal Reflux Disease)    Hyperlipidemia    Benign Prostatic Hypertrophy    Diverticulosis    Osteoarthritis    Osteoporosis    History of Supraventricular Tachycardia  s/p ablation    History of Cholecystectomy    History of Prior Ablation Treatment    S/P Cataract Surgery  left eye        Subjective:  reports dry cough persists.  Robitussin not helping.  States tends to get coughing like this in cold weather.   Dyspepsia not improved with Simethicone tabs.        VITALS  Vital Signs Last 24 Hrs  T(C): 36.6 (10 Sep 2021 08:29), Max: 36.6 (10 Sep 2021 08:29)  T(F): 97.8 (10 Sep 2021 08:29), Max: 97.8 (10 Sep 2021 08:29)  HR: 68 (10 Sep 2021 08:29) (66 - 68)  BP: 127/78 (10 Sep 2021 08:29) (127/78 - 136/78)  BP(mean): --  RR: 16 (10 Sep 2021 08:29) (16 - 16)  SpO2: 96% (10 Sep 2021 08:29) (96% - 96%)    REVIEW OF SYMPTOMS  Neurological deficits-- left sided weakness. (+) dry cough, dyspepsia  Denies any dizziness, CP, n/v, abdominal pain      Physical Exam:   Constitutional - NAD, Comfortable, sitting upright in chair  HEENT - NCAT, EOMI  Neck - Supple  Chest - good chest expansion, good respiratory effort. CTA b/l, no wheezing, rales, or rhonchi  Cardio - warm and well perfused, RRR, no murmur. + external cardiac monitor  Abdomen -  Soft, NTND  Extremities - No peripheral edema, No calf tenderness   Neurologic Exam: AAOx3, neuro stable--Left UE shoulder 3/5, elbow, wrist and fingers 4/5.  Coordination impaired.                Psychiatric - Mood stable, Affect WNL    RECENT LABS                        14.6   9.63  )-----------( 288      ( 09 Sep 2021 06:30 )             41.7     09-09    137  |  101  |  19  ----------------------------<  112<H>  4.3   |  30  |  1.18    Ca    8.8      09 Sep 2021 06:30    TPro  7.2  /  Alb  3.3  /  TBili  0.3  /  DBili  x   /  AST  41<H>  /  ALT  89<H>  /  AlkPhos  83  09-09            RADIOLOGY/OTHER RESULTS      MEDICATIONS  (STANDING):  aspirin enteric coated 81 milliGRAM(s) Oral daily  atorvastatin 80 milliGRAM(s) Oral at bedtime  clopidogrel Tablet 75 milliGRAM(s) Oral daily  enoxaparin Injectable 40 milliGRAM(s) SubCutaneous at bedtime  latanoprost 0.005% Ophthalmic Solution 1 Drop(s) Both EYES at bedtime  pantoprazole    Tablet 40 milliGRAM(s) Oral before breakfast  polyethylene glycol 3350 17 Gram(s) Oral daily  senna 2 Tablet(s) Oral at bedtime    MEDICATIONS  (PRN):  benzocaine 15 mG/menthol 3.6 mG (Sugar-Free) Lozenge 1 Lozenge Oral two times a day PRN Sore Throat  benzonatate 100 milliGRAM(s) Oral three times a day PRN Cough  bisacodyl Suppository 10 milliGRAM(s) Rectal at bedtime PRN Constipation  fluticasone propionate 50 MICROgram(s)/spray Nasal Spray 1 Spray(s) Both Nostrils two times a day PRN nasal congestion  guaiFENesin Oral Liquid (Sugar-Free) 100 milliGRAM(s) Oral every 6 hours PRN Cough  melatonin 6 milliGRAM(s) Oral at bedtime PRN Insomnia  phenol 1.4% (CHLORASEPTIC) Oral Spray 1 Spray(s) Topical three times a day PRN sore throat  simethicone 80 milliGRAM(s) Chew three times a day PRN Gas

## 2021-09-11 DIAGNOSIS — R10.13 EPIGASTRIC PAIN: ICD-10-CM

## 2021-09-11 DIAGNOSIS — R05 COUGH: ICD-10-CM

## 2021-09-11 PROCEDURE — 99232 SBSQ HOSP IP/OBS MODERATE 35: CPT

## 2021-09-11 RX ORDER — NYSTATIN 500MM UNIT
500000 POWDER (EA) MISCELLANEOUS THREE TIMES A DAY
Refills: 0 | Status: COMPLETED | OUTPATIENT
Start: 2021-09-11 | End: 2021-09-16

## 2021-09-11 RX ORDER — LACTOBACILLUS ACIDOPHILUS 100MM CELL
1 CAPSULE ORAL THREE TIMES A DAY
Refills: 0 | Status: DISCONTINUED | OUTPATIENT
Start: 2021-09-11 | End: 2021-09-13

## 2021-09-11 RX ADMIN — PANTOPRAZOLE SODIUM 40 MILLIGRAM(S): 20 TABLET, DELAYED RELEASE ORAL at 05:14

## 2021-09-11 RX ADMIN — Medication 500000 UNIT(S): at 21:30

## 2021-09-11 RX ADMIN — PANTOPRAZOLE SODIUM 40 MILLIGRAM(S): 20 TABLET, DELAYED RELEASE ORAL at 17:31

## 2021-09-11 RX ADMIN — LATANOPROST 1 DROP(S): 0.05 SOLUTION/ DROPS OPHTHALMIC; TOPICAL at 21:30

## 2021-09-11 RX ADMIN — ENOXAPARIN SODIUM 40 MILLIGRAM(S): 100 INJECTION SUBCUTANEOUS at 21:29

## 2021-09-11 RX ADMIN — Medication 1 TABLET(S): at 13:30

## 2021-09-11 RX ADMIN — Medication 1 TABLET(S): at 21:30

## 2021-09-11 RX ADMIN — Medication 100 MILLIGRAM(S): at 13:31

## 2021-09-11 RX ADMIN — MOMETASONE FUROATE 1 PUFF(S): 220 INHALANT RESPIRATORY (INHALATION) at 08:37

## 2021-09-11 RX ADMIN — Medication 81 MILLIGRAM(S): at 11:42

## 2021-09-11 RX ADMIN — CLOPIDOGREL BISULFATE 75 MILLIGRAM(S): 75 TABLET, FILM COATED ORAL at 11:42

## 2021-09-11 RX ADMIN — ATORVASTATIN CALCIUM 80 MILLIGRAM(S): 80 TABLET, FILM COATED ORAL at 21:30

## 2021-09-11 NOTE — PROGRESS NOTE ADULT - COMMENTS
Patiet continues to have persistent cough not responsive to antitussives or lozenges. Reports h/o cough prior to admission but now worse. non productive. No SOB. +throat discomfort

## 2021-09-11 NOTE — PROGRESS NOTE ADULT - ASSESSMENT
Patient is a 86 Year old male with a PMHx of HLD, BPH, History of Supraventricular Tachycardia s/p ablation & Osteoarthritis who presented to  ED on 8/28 with L arm weakness and paraesthesia. + TPA. MRI shows right MCA watershed infarct. Admitted  to acute rehabilitation with left UE monoparesis, sensory impairment, dysarthria, mild cognitive deficits, gait and ADL impairments.      #R MCA infarct  - Lipid panel wnl except HDL 35, HbA1C 5.6  - Continue DAPT per cardio and neuro recs  - Continue atorvastatin  - cont comprehensive rehab program, PT/OT/SLP 3 hours a day, 5 days a week  - Precautions: cardiac, Aspiration, falls    #Hypertension   -BP controlled off medications  -  (114/72 - 116/74) 9/11    #Nasal congestion/ throat pain  - PRN Flonase.   -Cepacol PRN    #Cough  - Continue tessalon perle 100mg TID PRN  - Robitussin added PRN but not improving  -- albuterol inhaler PRN  - nystatin swish and swallow ordered for thrush 9/11    #HLD   - Continue 80mg Atorvastatin.     #Hiatal hernia--dyspepsia   -simethicone not helping-  -increase Protonix 40 mg BID  -  stool for H. Pylori    #PREET  -Continue to use CPAP at night.     #GI  - Dulcolax supp prn, Miralax daily, senna 2 tabs at qhs  - Simethicone PRN dyspepsia    #Diet  - Regular - Carb controlled - DASH/ TLC diet.     #DVT prophylaxis.   - Continue Heparin

## 2021-09-11 NOTE — PROGRESS NOTE ADULT - SUBJECTIVE AND OBJECTIVE BOX
Patient is a 86y old  Male who presents with a chief complaint of R MCA CVA (10 Sep 2021 13:18)      Patient seen and examined at bedside.  No overnight events  Had diarrhea yesterday, did not take miralax or senna    ALLERGIES:  sulfa drugs (Unknown)    MEDICATIONS  (STANDING):  aspirin enteric coated 81 milliGRAM(s) Oral daily  atorvastatin 80 milliGRAM(s) Oral at bedtime  clopidogrel Tablet 75 milliGRAM(s) Oral daily  enoxaparin Injectable 40 milliGRAM(s) SubCutaneous at bedtime  lactobacillus acidophilus 1 Tablet(s) Oral three times a day  latanoprost 0.005% Ophthalmic Solution 1 Drop(s) Both EYES at bedtime  mometasone 220 MICROgram(s) Inhaler 1 Puff(s) Inhalation daily  pantoprazole    Tablet 40 milliGRAM(s) Oral every 12 hours  polyethylene glycol 3350 17 Gram(s) Oral daily  senna 2 Tablet(s) Oral at bedtime    MEDICATIONS  (PRN):  ALBUTerol    90 MICROgram(s) HFA Inhaler 2 Puff(s) Inhalation every 6 hours PRN Shortness of Breath  aluminum hydroxide/magnesium hydroxide/simethicone Suspension 30 milliLiter(s) Oral every 4 hours PRN Dyspepsia  benzocaine 15 mG/menthol 3.6 mG (Sugar-Free) Lozenge 1 Lozenge Oral two times a day PRN Sore Throat  benzonatate 100 milliGRAM(s) Oral three times a day PRN Cough  bisacodyl Suppository 10 milliGRAM(s) Rectal at bedtime PRN Constipation  fluticasone propionate 50 MICROgram(s)/spray Nasal Spray 1 Spray(s) Both Nostrils two times a day PRN nasal congestion  guaiFENesin Oral Liquid (Sugar-Free) 100 milliGRAM(s) Oral every 6 hours PRN Cough  melatonin 6 milliGRAM(s) Oral at bedtime PRN Insomnia  phenol 1.4% (CHLORASEPTIC) Oral Spray 1 Spray(s) Topical three times a day PRN sore throat  simethicone 80 milliGRAM(s) Chew three times a day PRN Gas    Vital Signs Last 24 Hrs  T(F): 97.5 (11 Sep 2021 08:23), Max: 97.9 (10 Sep 2021 20:34)  HR: 74 (11 Sep 2021 08:39) (67 - 74)  BP: 114/72 (11 Sep 2021 08:23) (114/72 - 116/74)  RR: 16 (11 Sep 2021 08:23) (16 - 16)  SpO2: 96% (11 Sep 2021 08:39) (96% - 97%)  I&O's Summary    10 Sep 2021 07:01  -  11 Sep 2021 07:00  --------------------------------------------------------  IN: 0 mL / OUT: 476 mL / NET: -476 mL      PHYSICAL EXAM:  GENERAL: NAD  HENT:  Atraumatic, Normocephalic; No tonsillar erythema, exudates, or enlargement; Moist mucous membranes;   EYES: EOMI, PERRLA, conjunctiva and sclera clear, no lid-lag  NECK: Supple, No JVD, Normal thyroid  CHEST/LUNG: Clear to percussion bilaterally; No rales, rhonchi, wheezing, or rubs; normal respiratory effort, no intercostal retractions  HEART: Regular rate and rhythm; No murmurs, rubs, or gallops; No pitting edema  ABDOMEN: Soft, Nontender, Nondistended; Bowel sounds present; No HSM  MUSCULOSKELETAL/EXTREMITIES:  2+ Peripheral Pulses, No clubbing or digital cyanosis  PSYCH: Appropriate affect, Alert & Awake    LABS:                        14.6   9.63  )-----------( 288      ( 09 Sep 2021 06:30 )             41.7       09-09    137  |  101  |  19  ----------------------------<  112  4.3   |  30  |  1.18    Ca    8.8      09 Sep 2021 06:30    TPro  7.2  /  Alb  3.3  /  TBili  0.3  /  DBili  x   /  AST  41  /  ALT  89  /  AlkPhos  83  09-09     eGFR if Non African American: 56 mL/min/1.73M2 (09-09-21 @ 06:30)  eGFR if : 65 mL/min/1.73M2 (09-09-21 @ 06:30)    08-29 Chol 124 mg/dL LDL -- HDL 35 mg/dL Trig 78 mg/dL    COVID-19 PCR: NotDetec (09-07-21 @ 06:10)  COVID-19 PCR: NotDetec (08-31-21 @ 20:57)  COVID-19 PCR: NotDetec (08-28-21 @ 17:30)      Care Discussed with Rehab Attending and Other Providers

## 2021-09-11 NOTE — PROGRESS NOTE ADULT - PROBLEM SELECTOR PLAN 6
- mildly elevated  - continue to monitor - Chronic issue and has seen outpatient GI  - Starting lactobacillus, On PPI BID, Maalox PRN, Simethicone PRN

## 2021-09-11 NOTE — PROGRESS NOTE ADULT - PROBLEM SELECTOR PLAN 7
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

## 2021-09-11 NOTE — PROGRESS NOTE ADULT - PROBLEM SELECTOR PLAN 8
- Resolved  - noted to be elevated however patient afebrile and nontoxic appearing - mildly elevated  - continue to monitor

## 2021-09-11 NOTE — PROGRESS NOTE ADULT - PROBLEM SELECTOR PLAN 9
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

## 2021-09-12 PROCEDURE — 99232 SBSQ HOSP IP/OBS MODERATE 35: CPT

## 2021-09-12 RX ADMIN — CLOPIDOGREL BISULFATE 75 MILLIGRAM(S): 75 TABLET, FILM COATED ORAL at 11:15

## 2021-09-12 RX ADMIN — ATORVASTATIN CALCIUM 80 MILLIGRAM(S): 80 TABLET, FILM COATED ORAL at 21:54

## 2021-09-12 RX ADMIN — Medication 81 MILLIGRAM(S): at 11:15

## 2021-09-12 RX ADMIN — ENOXAPARIN SODIUM 40 MILLIGRAM(S): 100 INJECTION SUBCUTANEOUS at 21:54

## 2021-09-12 RX ADMIN — Medication 500000 UNIT(S): at 21:55

## 2021-09-12 RX ADMIN — PANTOPRAZOLE SODIUM 40 MILLIGRAM(S): 20 TABLET, DELAYED RELEASE ORAL at 05:11

## 2021-09-12 RX ADMIN — PANTOPRAZOLE SODIUM 40 MILLIGRAM(S): 20 TABLET, DELAYED RELEASE ORAL at 18:02

## 2021-09-12 RX ADMIN — Medication 1 TABLET(S): at 05:11

## 2021-09-12 RX ADMIN — Medication 1 TABLET(S): at 21:54

## 2021-09-12 RX ADMIN — LATANOPROST 1 DROP(S): 0.05 SOLUTION/ DROPS OPHTHALMIC; TOPICAL at 21:55

## 2021-09-12 RX ADMIN — MOMETASONE FUROATE 1 PUFF(S): 220 INHALANT RESPIRATORY (INHALATION) at 08:18

## 2021-09-12 RX ADMIN — Medication 1 TABLET(S): at 13:28

## 2021-09-12 RX ADMIN — Medication 500000 UNIT(S): at 05:11

## 2021-09-12 NOTE — PROGRESS NOTE ADULT - PROBLEM SELECTOR PLAN 6
- Chronic issue and has seen outpatient GI  - Lactobacillus TID, On PPI BID, Maalox PRN, Simethicone PRN  - bowel regimen held due to diarrhea

## 2021-09-12 NOTE — PROGRESS NOTE ADULT - SUBJECTIVE AND OBJECTIVE BOX
Patient is a 86y old  Male who presents with a chief complaint of R MCA CVA (11 Sep 2021 12:37)      Patient seen and examined at bedside.  No overnight events  Reports having water stool. Stopping bowel regimen    ALLERGIES:  sulfa drugs (Unknown)    MEDICATIONS  (STANDING):  aspirin enteric coated 81 milliGRAM(s) Oral daily  atorvastatin 80 milliGRAM(s) Oral at bedtime  clopidogrel Tablet 75 milliGRAM(s) Oral daily  enoxaparin Injectable 40 milliGRAM(s) SubCutaneous at bedtime  lactobacillus acidophilus 1 Tablet(s) Oral three times a day  latanoprost 0.005% Ophthalmic Solution 1 Drop(s) Both EYES at bedtime  mometasone 220 MICROgram(s) Inhaler 1 Puff(s) Inhalation daily  nystatin    Suspension 124856 Unit(s) Oral three times a day  pantoprazole    Tablet 40 milliGRAM(s) Oral every 12 hours    MEDICATIONS  (PRN):  ALBUTerol    90 MICROgram(s) HFA Inhaler 2 Puff(s) Inhalation every 6 hours PRN Shortness of Breath  aluminum hydroxide/magnesium hydroxide/simethicone Suspension 30 milliLiter(s) Oral every 4 hours PRN Dyspepsia  benzocaine 15 mG/menthol 3.6 mG (Sugar-Free) Lozenge 1 Lozenge Oral two times a day PRN Sore Throat  benzonatate 100 milliGRAM(s) Oral three times a day PRN Cough  bisacodyl Suppository 10 milliGRAM(s) Rectal at bedtime PRN Constipation  fluticasone propionate 50 MICROgram(s)/spray Nasal Spray 1 Spray(s) Both Nostrils two times a day PRN nasal congestion  guaiFENesin Oral Liquid (Sugar-Free) 100 milliGRAM(s) Oral every 6 hours PRN Cough  melatonin 6 milliGRAM(s) Oral at bedtime PRN Insomnia  phenol 1.4% (CHLORASEPTIC) Oral Spray 1 Spray(s) Topical three times a day PRN sore throat  simethicone 80 milliGRAM(s) Chew three times a day PRN Gas    Vital Signs Last 24 Hrs  T(F): 97.9 (12 Sep 2021 08:36), Max: 98 (11 Sep 2021 19:27)  HR: 77 (12 Sep 2021 08:36) (66 - 77)  BP: 123/80 (12 Sep 2021 08:36) (122/75 - 123/80)  RR: 16 (12 Sep 2021 08:36) (16 - 16)  SpO2: 97% (12 Sep 2021 08:36) (96% - 97%)  I&O's Summary    11 Sep 2021 07:01  -  12 Sep 2021 07:00  --------------------------------------------------------  IN: 0 mL / OUT: 250 mL / NET: -250 mL          PHYSICAL EXAM:  GENERAL: NAD  HENT:  Atraumatic, Normocephalic; No tonsillar erythema, exudates, or enlargement; Moist mucous membranes;   EYES: EOMI, PERRLA, conjunctiva and sclera clear, no lid-lag  NECK: Supple, No JVD, Normal thyroid  CHEST/LUNG: Clear to percussion bilaterally; No rales, rhonchi, wheezing, or rubs; normal respiratory effort, no intercostal retractions  HEART: Regular rate and rhythm; No murmurs, rubs, or gallops; No pitting edema  ABDOMEN: Soft, Nontender, Nondistended; Bowel sounds present; No HSM  MUSCULOSKELETAL/EXTREMITIES:  2+ Peripheral Pulses, No clubbing or digital cyanosis  PSYCH: Appropriate affect, Alert & Awake    LABS:                        08-29 Chol 124 mg/dL LDL -- HDL 35 mg/dL Trig 78 mg/dL                      COVID-19 PCR: NotDetec (09-07-21 @ 06:10)  COVID-19 PCR: NotDetec (08-31-21 @ 20:57)  COVID-19 PCR: NotDetec (08-28-21 @ 17:30)      Care Discussed with Rehab Attending and Other Providers

## 2021-09-12 NOTE — PROGRESS NOTE ADULT - SUBJECTIVE AND OBJECTIVE BOX
Patient is a 86y old  Male who presents with a chief complaint of R MCA CVA (12 Sep 2021 12:15)      HPI:  Patient is a 86 Year old male with a PMHx of HLD, BPH, History of Supraventricular Tachycardia s/p ablation & Osteoarthritis, PREET on home cpap who presented to  ED on  who woke up from a nap and had L arm weakness and paraesthesia In the ED he had a negative Head CT and was given TPA. MRI of the head was significant for small uncomplicated right MCA watershed infarct. CT angio of brain showed proximal internal carotid artery with at least moderate narrowing but no occlusion. Post TPA Patient continued to have LUE > LLE weakness.   Patient was seen by cardiology who were unable to preform TTE due to probe not being passed successfully. TTE bubble study was negative for any PFO. Also recommended Ecotrin to 81 mg po daily, Plavix 75 mg po daily & Statin. Pt recommended for acute inpatient rehabilitation and was transferred to [facility] on 2021.            (31 Aug 2021 15:52)      PAST MEDICAL & SURGICAL HISTORY:  GERD (Gastroesophageal Reflux Disease)    Hyperlipidemia    Benign Prostatic Hypertrophy    Diverticulosis    Osteoarthritis    Osteoporosis    History of Supraventricular Tachycardia  s/p ablation    History of Cholecystectomy    History of Prior Ablation Treatment    S/P Cataract Surgery  left eye        MEDICATIONS  (STANDING):  aspirin enteric coated 81 milliGRAM(s) Oral daily  atorvastatin 80 milliGRAM(s) Oral at bedtime  clopidogrel Tablet 75 milliGRAM(s) Oral daily  enoxaparin Injectable 40 milliGRAM(s) SubCutaneous at bedtime  lactobacillus acidophilus 1 Tablet(s) Oral three times a day  latanoprost 0.005% Ophthalmic Solution 1 Drop(s) Both EYES at bedtime  mometasone 220 MICROgram(s) Inhaler 1 Puff(s) Inhalation daily  nystatin    Suspension 185277 Unit(s) Oral three times a day  pantoprazole    Tablet 40 milliGRAM(s) Oral every 12 hours    MEDICATIONS  (PRN):  ALBUTerol    90 MICROgram(s) HFA Inhaler 2 Puff(s) Inhalation every 6 hours PRN Shortness of Breath  aluminum hydroxide/magnesium hydroxide/simethicone Suspension 30 milliLiter(s) Oral every 4 hours PRN Dyspepsia  benzocaine 15 mG/menthol 3.6 mG (Sugar-Free) Lozenge 1 Lozenge Oral two times a day PRN Sore Throat  benzonatate 100 milliGRAM(s) Oral three times a day PRN Cough  bisacodyl Suppository 10 milliGRAM(s) Rectal at bedtime PRN Constipation  fluticasone propionate 50 MICROgram(s)/spray Nasal Spray 1 Spray(s) Both Nostrils two times a day PRN nasal congestion  guaiFENesin Oral Liquid (Sugar-Free) 100 milliGRAM(s) Oral every 6 hours PRN Cough  melatonin 6 milliGRAM(s) Oral at bedtime PRN Insomnia  phenol 1.4% (CHLORASEPTIC) Oral Spray 1 Spray(s) Topical three times a day PRN sore throat  simethicone 80 milliGRAM(s) Chew three times a day PRN Gas      Allergies    sulfa drugs (Unknown)    Intolerances          VITALS  86y  Vital Signs Last 24 Hrs  T(C): 36.6 (12 Sep 2021 08:36), Max: 36.7 (11 Sep 2021 19:27)  T(F): 97.9 (12 Sep 2021 08:36), Max: 98 (11 Sep 2021 19:27)  HR: 77 (12 Sep 2021 08:36) (66 - 77)  BP: 123/80 (12 Sep 2021 08:36) (122/75 - 123/80)  BP(mean): --  RR: 16 (12 Sep 2021 08:36) (16 - 16)  SpO2: 97% (12 Sep 2021 08:36) (96% - 97%)  Daily     Daily Weight in k.3 (12 Sep 2021 00:18)        RECENT LABS:                      CAPILLARY BLOOD GLUCOSE      Review of Systems:   · Additional ROS	Patient with loose stool yesterday, sl mucousy and today with copious watery diarrhea. no fever, no abdmoinal pain or N/V    Physical Exam:   · Constitutional	detailed exam  · Constitutional Comments	mild distress due to diarrhea  · ENMT	detailed exam  · ENMT Comments	+oral thrush no posterior throat injection or swelling noted  · Respiratory	detailed exam  · Respiratory Details	breath sounds equal; respirations non-labored; clear to auscultation bilaterally; no wheezes  · Cardiovascular	detailed exam  · Cardiovascular Details	regular rate and rhythm  · Gastrointestinal	detailed exam  · GI Normal	soft; nontender  no R/G  · Extremities	detailed exam  · Extremities Comments	calves soft, NT bilaterally

## 2021-09-12 NOTE — PROGRESS NOTE ADULT - PROBLEM SELECTOR PLAN 9
- likely on CKD II  - Encourage oral intake  - Avoid nephrotoxic agents  - Monitor BMP as per routine

## 2021-09-12 NOTE — PROGRESS NOTE ADULT - ASSESSMENT
Patient is a 86 Year old male with a PMHx of HLD, BPH, History of Supraventricular Tachycardia s/p ablation & Osteoarthritis who presented to  ED on 8/28 with L arm weakness and paraesthesia. + TPA. MRI shows right MCA watershed infarct. Admitted  to acute rehabilitation with left UE monoparesis, sensory impairment, dysarthria, mild cognitive deficits, gait and ADL impairments.      #R MCA infarct  - Lipid panel wnl except HDL 35, HbA1C 5.6  - Continue DAPT per cardio and neuro recs  - Continue atorvastatin  - cont comprehensive rehab program, PT/OT/SLP 3 hours a day, 5 days a week  - Precautions: cardiac, Aspiration, falls    #Hypertension   -BP controlled off medications  - (122/75 - 123/80) 9/12    #Nasal congestion/ throat pain  - PRN Flonase.   -Cepacol PRN    #Cough  - Continue tessalon perle 100mg TID PRN  - Robitussin added PRN but not improving  -- albuterol inhaler PRN  - nystatin swish and swallow ordered for thrush 9/11    #HLD   - Continue 80mg Atorvastatin.     #Hiatal hernia--dyspepsia   -simethicone not helping-  -increase Protonix 40 mg BID  -  stool for H. Pylori pending    # watery diarrhea 9/12  - afebrile, no leukocytosis  - COVID negative 9/7  - vitals stable, no abdominal pain, N/V  - discussed with hospitalist. All laxatives held  - hold off on GI PCR pending response to dc bowel meds    #PREET  -Continue to use CPAP at night.     #Diet  - Regular - Carb controlled - DASH/ TLC diet.     #DVT prophylaxis.   - Continue Heparin

## 2021-09-12 NOTE — PROGRESS NOTE ADULT - PROBLEM SELECTOR PLAN 7
- States he previously was on an inhaled steroid for the same issue in the past but was discontinued because of potential affects on the lungs  - Continue mometasone (Stop after 7 doses)  - Continue albuterol PRN

## 2021-09-13 DIAGNOSIS — R19.5 OTHER FECAL ABNORMALITIES: ICD-10-CM

## 2021-09-13 LAB
ALBUMIN SERPL ELPH-MCNC: 3.2 G/DL — LOW (ref 3.3–5)
ALP SERPL-CCNC: 72 U/L — SIGNIFICANT CHANGE UP (ref 40–120)
ALT FLD-CCNC: 63 U/L — HIGH (ref 10–45)
ANION GAP SERPL CALC-SCNC: 8 MMOL/L — SIGNIFICANT CHANGE UP (ref 5–17)
AST SERPL-CCNC: 27 U/L — SIGNIFICANT CHANGE UP (ref 10–40)
BILIRUB SERPL-MCNC: 0.4 MG/DL — SIGNIFICANT CHANGE UP (ref 0.2–1.2)
BUN SERPL-MCNC: 13 MG/DL — SIGNIFICANT CHANGE UP (ref 7–23)
CALCIUM SERPL-MCNC: 8.5 MG/DL — SIGNIFICANT CHANGE UP (ref 8.4–10.5)
CHLORIDE SERPL-SCNC: 105 MMOL/L — SIGNIFICANT CHANGE UP (ref 96–108)
CO2 SERPL-SCNC: 25 MMOL/L — SIGNIFICANT CHANGE UP (ref 22–31)
CREAT SERPL-MCNC: 0.96 MG/DL — SIGNIFICANT CHANGE UP (ref 0.5–1.3)
GLUCOSE SERPL-MCNC: 98 MG/DL — SIGNIFICANT CHANGE UP (ref 70–99)
HCT VFR BLD CALC: 39.5 % — SIGNIFICANT CHANGE UP (ref 39–50)
HGB BLD-MCNC: 13.9 G/DL — SIGNIFICANT CHANGE UP (ref 13–17)
MCHC RBC-ENTMCNC: 34.7 PG — HIGH (ref 27–34)
MCHC RBC-ENTMCNC: 35.2 GM/DL — SIGNIFICANT CHANGE UP (ref 32–36)
MCV RBC AUTO: 98.5 FL — SIGNIFICANT CHANGE UP (ref 80–100)
NRBC # BLD: 0 /100 WBCS — SIGNIFICANT CHANGE UP (ref 0–0)
PLATELET # BLD AUTO: 289 K/UL — SIGNIFICANT CHANGE UP (ref 150–400)
POTASSIUM SERPL-MCNC: 4 MMOL/L — SIGNIFICANT CHANGE UP (ref 3.5–5.3)
POTASSIUM SERPL-SCNC: 4 MMOL/L — SIGNIFICANT CHANGE UP (ref 3.5–5.3)
PROT SERPL-MCNC: 6.6 G/DL — SIGNIFICANT CHANGE UP (ref 6–8.3)
RBC # BLD: 4.01 M/UL — LOW (ref 4.2–5.8)
RBC # FLD: 12.4 % — SIGNIFICANT CHANGE UP (ref 10.3–14.5)
SARS-COV-2 RNA SPEC QL NAA+PROBE: SIGNIFICANT CHANGE UP
SODIUM SERPL-SCNC: 138 MMOL/L — SIGNIFICANT CHANGE UP (ref 135–145)
WBC # BLD: 8.55 K/UL — SIGNIFICANT CHANGE UP (ref 3.8–10.5)
WBC # FLD AUTO: 8.55 K/UL — SIGNIFICANT CHANGE UP (ref 3.8–10.5)

## 2021-09-13 PROCEDURE — 99232 SBSQ HOSP IP/OBS MODERATE 35: CPT

## 2021-09-13 PROCEDURE — 99233 SBSQ HOSP IP/OBS HIGH 50: CPT

## 2021-09-13 RX ADMIN — PANTOPRAZOLE SODIUM 40 MILLIGRAM(S): 20 TABLET, DELAYED RELEASE ORAL at 05:17

## 2021-09-13 RX ADMIN — ENOXAPARIN SODIUM 40 MILLIGRAM(S): 100 INJECTION SUBCUTANEOUS at 21:02

## 2021-09-13 RX ADMIN — Medication 500000 UNIT(S): at 05:17

## 2021-09-13 RX ADMIN — LATANOPROST 1 DROP(S): 0.05 SOLUTION/ DROPS OPHTHALMIC; TOPICAL at 21:02

## 2021-09-13 RX ADMIN — Medication 500000 UNIT(S): at 13:18

## 2021-09-13 RX ADMIN — Medication 81 MILLIGRAM(S): at 12:35

## 2021-09-13 RX ADMIN — PANTOPRAZOLE SODIUM 40 MILLIGRAM(S): 20 TABLET, DELAYED RELEASE ORAL at 18:40

## 2021-09-13 RX ADMIN — CLOPIDOGREL BISULFATE 75 MILLIGRAM(S): 75 TABLET, FILM COATED ORAL at 12:35

## 2021-09-13 RX ADMIN — Medication 20 MILLIGRAM(S): at 18:40

## 2021-09-13 RX ADMIN — Medication 20 MILLIGRAM(S): at 12:35

## 2021-09-13 RX ADMIN — ATORVASTATIN CALCIUM 80 MILLIGRAM(S): 80 TABLET, FILM COATED ORAL at 21:03

## 2021-09-13 RX ADMIN — MOMETASONE FUROATE 1 PUFF(S): 220 INHALANT RESPIRATORY (INHALATION) at 12:58

## 2021-09-13 RX ADMIN — Medication 500000 UNIT(S): at 21:03

## 2021-09-13 RX ADMIN — Medication 1 TABLET(S): at 05:17

## 2021-09-13 NOTE — PROGRESS NOTE ADULT - PROBLEM SELECTOR PLAN 1
- Acute R MCA infarct on MRI s/p tPA  - CTA neck with R ICA bifurcation with moderate narrowing  - cardio wanted to perform ARSH as cardioembolic cause could not be excluded (moderate narrowing of proximal internal carotid artery unlikely to explain R watershed infarct)  -TTE bubble study was performed which was negative for PFO  - continue with DAPT with asa/plavix and continue with statin

## 2021-09-13 NOTE — PROGRESS NOTE ADULT - SUBJECTIVE AND OBJECTIVE BOX
86y old  Male who presents with a chief complaint of R MCA CVA     Patient seen and examined at bedside.  c/o multiple loose stools intermittently since he was admitted to rehab, yesterday he states he had 10 loose sttols and feels bloated and gassy, no difficulty urinating, no headaches, no dizziness, also say she has a dry cough, no fever, tolerating po diet.   Reports having water stool. Stopping bowel regimen        Vital Signs Last 24 Hrs  T(C): 36.7 (13 Sep 2021 08:53), Max: 36.7 (12 Sep 2021 19:34)  T(F): 98.1 (13 Sep 2021 08:53), Max: 98.1 (13 Sep 2021 08:53)  HR: 74 (13 Sep 2021 08:53) (70 - 74)  BP: 114/74 (13 Sep 2021 08:53) (114/74 - 129/74)  BP(mean): --  RR: 16 (13 Sep 2021 08:53) (16 - 16)  SpO2: 96% (13 Sep 2021 08:53) (96% - 97%)    GENERAL- NAD  EAR/NOSE/MOUTH/THROAT - no pharyngeal exudates, no oral leisions,  MMM  EYES- MICHELLE, conjunctiva and Sclera clear  NECK- supple  RESPIRATORY-  clear to auscultation bilaterally, non laboured breathing  CARDIOVASCULAR - SIS2, RRR  GI - soft NT BS present  EXTREMITIES- no pedal edema  NEUROLOGY- no gross focal deficits  PSYCHIATRY- AAO X 3  MUSCULOSKELETAL- ROM normal                  13.9                 138  | 25   | 13           8.55  >-----------< 289     ------------------------< 98                    39.5                 4.0  | 105  | 0.96                                         Ca 8.5   Mg x     Ph x              MEDICATIONS  (STANDING):  aspirin enteric coated 81 milliGRAM(s) Oral daily  atorvastatin 80 milliGRAM(s) Oral at bedtime  clopidogrel Tablet 75 milliGRAM(s) Oral daily  enoxaparin Injectable 40 milliGRAM(s) SubCutaneous at bedtime  lactobacillus acidophilus 1 Tablet(s) Oral three times a day  latanoprost 0.005% Ophthalmic Solution 1 Drop(s) Both EYES at bedtime  mometasone 220 MICROgram(s) Inhaler 1 Puff(s) Inhalation daily  nystatin    Suspension 239657 Unit(s) Oral three times a day  pantoprazole    Tablet 40 milliGRAM(s) Oral every 12 hours    MEDICATIONS  (PRN):  ALBUTerol    90 MICROgram(s) HFA Inhaler 2 Puff(s) Inhalation every 6 hours PRN Shortness of Breath  aluminum hydroxide/magnesium hydroxide/simethicone Suspension 30 milliLiter(s) Oral every 4 hours PRN Dyspepsia  benzocaine 15 mG/menthol 3.6 mG (Sugar-Free) Lozenge 1 Lozenge Oral two times a day PRN Sore Throat  benzonatate 100 milliGRAM(s) Oral three times a day PRN Cough  bisacodyl Suppository 10 milliGRAM(s) Rectal at bedtime PRN Constipation  fluticasone propionate 50 MICROgram(s)/spray Nasal Spray 1 Spray(s) Both Nostrils two times a day PRN nasal congestion  guaiFENesin Oral Liquid (Sugar-Free) 100 milliGRAM(s) Oral every 6 hours PRN Cough  melatonin 6 milliGRAM(s) Oral at bedtime PRN Insomnia  phenol 1.4% (CHLORASEPTIC) Oral Spray 1 Spray(s) Topical three times a day PRN sore throat  simethicone 80 milliGRAM(s) Chew three times a day PRN Gas

## 2021-09-13 NOTE — PROGRESS NOTE ADULT - SUBJECTIVE AND OBJECTIVE BOX
Patient is a 86y old  Male who presents with a chief complaint of R MCA CVA (13 Sep 2021 10:47)      HPI:  Patient is a 86 Year old male with a PMHx of HLD, BPH, History of Supraventricular Tachycardia s/p ablation & Osteoarthritis, PREET on home cpap who presented to  ED on  who woke up from a nap and had L arm weakness and paraesthesia In the ED he had a negative Head CT and was given TPA. MRI of the head was significant for small uncomplicated right MCA watershed infarct. CT angio of brain showed proximal internal carotid artery with at least moderate narrowing but no occlusion. Post TPA Patient continued to have LUE > LLE weakness.   Patient was seen by cardiology who were unable to preform TTE due to probe not being passed successfully. TTE bubble study was negative for any PFO. Also recommended Ecotrin to 81 mg po daily, Plavix 75 mg po daily & Statin. Pt recommended for acute inpatient rehabilitation and was transferred to [facility] on 2021.            (31 Aug 2021 15:52)        SUBJECTIVE: Patient seen and examined. No acute overnight events, slept well. Patient with ~10 episodes of loose stool over the weekend. Patient notes intermittent dyspepsia and bloating since admission to rehab. Patient w/ 1 BM this AM which was soft as per nursing. H pylori antigen pending. Otherwise feels well and denies any complaints      REVIEW OF SYMPTOMS  Neurological deficits-- (+) left sided weakness. (+) dry cough, dyspepsia, loose stools  Denies any dizziness, CP, n/v, abdominal pain      VITALS  86y  Vital Signs Last 24 Hrs  T(C): 36.7 (13 Sep 2021 08:53), Max: 36.7 (12 Sep 2021 19:34)  T(F): 98.1 (13 Sep 2021 08:53), Max: 98.1 (13 Sep 2021 08:53)  HR: 74 (13 Sep 2021 08:53) (70 - 74)  BP: 114/74 (13 Sep 2021 08:53) (114/74 - 129/74)  BP(mean): --  RR: 16 (13 Sep 2021 08:53) (16 - 16)  SpO2: 96% (13 Sep 2021 08:53) (96% - 97%)  Daily     Daily Weight in k.1 (13 Sep 2021 00:01)        Physical Exam:   Constitutional - NAD, Comfortable, laying comfortably in bed  HEENT - NCAT, EOMI  Neck - Supple  Chest - good chest expansion, good respiratory effort.  Cardio - warm and well perfused, RRR, no murmur. + external cardiac monitor  Abdomen -  Soft, NTND. Normoactive bowel sounds. No epigastric tenderness  Extremities - No peripheral edema, No calf tenderness   Neurologic Exam: AAOx3, neuro stable      Psychiatric - Mood stable, Affect WNL        FUNCTIONAL STATUS:        RECENT LABS:                        13.9   8.55  )-----------( 289      ( 13 Sep 2021 06:30 )             39.5         138  |  105  |  13  ----------------------------<  98  4.0   |  25  |  0.96    Ca    8.5      13 Sep 2021 06:30    TPro  6.6  /  Alb  3.2<L>  /  TBili  0.4  /  DBili  x   /  AST  27  /  ALT  63<H>  /  AlkPhos  72      LIVER FUNCTIONS - ( 13 Sep 2021 06:30 )  Alb: 3.2 g/dL / Pro: 6.6 g/dL / ALK PHOS: 72 U/L / ALT: 63 U/L / AST: 27 U/L / GGT: x                   CAPILLARY BLOOD GLUCOSE            MEDICATIONS:  MEDICATIONS  (STANDING):  aspirin enteric coated 81 milliGRAM(s) Oral daily  atorvastatin 80 milliGRAM(s) Oral at bedtime  clopidogrel Tablet 75 milliGRAM(s) Oral daily  dicyclomine 20 milliGRAM(s) Oral three times a day before meals  enoxaparin Injectable 40 milliGRAM(s) SubCutaneous at bedtime  latanoprost 0.005% Ophthalmic Solution 1 Drop(s) Both EYES at bedtime  mometasone 220 MICROgram(s) Inhaler 1 Puff(s) Inhalation daily  nystatin    Suspension 732361 Unit(s) Oral three times a day  pantoprazole    Tablet 40 milliGRAM(s) Oral every 12 hours    MEDICATIONS  (PRN):  ALBUTerol    90 MICROgram(s) HFA Inhaler 2 Puff(s) Inhalation every 6 hours PRN Shortness of Breath  aluminum hydroxide/magnesium hydroxide/simethicone Suspension 30 milliLiter(s) Oral every 4 hours PRN Dyspepsia  benzocaine 15 mG/menthol 3.6 mG (Sugar-Free) Lozenge 1 Lozenge Oral two times a day PRN Sore Throat  benzonatate 100 milliGRAM(s) Oral three times a day PRN Cough  bisacodyl Suppository 10 milliGRAM(s) Rectal at bedtime PRN Constipation  fluticasone propionate 50 MICROgram(s)/spray Nasal Spray 1 Spray(s) Both Nostrils two times a day PRN nasal congestion  guaiFENesin Oral Liquid (Sugar-Free) 100 milliGRAM(s) Oral every 6 hours PRN Cough  melatonin 6 milliGRAM(s) Oral at bedtime PRN Insomnia  phenol 1.4% (CHLORASEPTIC) Oral Spray 1 Spray(s) Topical three times a day PRN sore throat  simethicone 80 milliGRAM(s) Chew three times a day PRN Gas     Patient is a 86y old  Male who presents with a chief complaint of R MCA CVA (13 Sep 2021 10:47)      HPI:  Patient is a 86 Year old male with a PMHx of HLD, BPH, History of Supraventricular Tachycardia s/p ablation & Osteoarthritis, PREET on home cpap who presented to  ED on  who woke up from a nap and had L arm weakness and paraesthesia In the ED he had a negative Head CT and was given TPA. MRI of the head was significant for small uncomplicated right MCA watershed infarct. CT angio of brain showed proximal internal carotid artery with at least moderate narrowing but no occlusion. Post TPA Patient continued to have LUE > LLE weakness.   Patient was seen by cardiology who were unable to preform TTE due to probe not being passed successfully. TTE bubble study was negative for any PFO. Also recommended Ecotrin to 81 mg po daily, Plavix 75 mg po daily & Statin. Pt recommended for acute inpatient rehabilitation and was transferred to [facility] on 2021.             SUBJECTIVE: Patient seen and examined. No acute overnight events, slept well. Patient with ~10 episodes of loose stool over the weekend. Patient notes intermittent dyspepsia and bloating since admission to rehab. Patient w/ 1 BM this AM which was soft as per nursing. H pylori antigen pending. Otherwise feels well and denies any complaints      REVIEW OF SYMPTOMS  Neurological deficits-- (+) left sided weakness. (+) dry cough, dyspepsia, loose stools  Denies any dizziness, CP, n/v, abdominal pain      VITALS  86y  Vital Signs Last 24 Hrs  T(C): 36.7 (13 Sep 2021 08:53), Max: 36.7 (12 Sep 2021 19:34)  T(F): 98.1 (13 Sep 2021 08:53), Max: 98.1 (13 Sep 2021 08:53)  HR: 74 (13 Sep 2021 08:53) (70 - 74)  BP: 114/74 (13 Sep 2021 08:53) (114/74 - 129/74)  BP(mean): --  RR: 16 (13 Sep 2021 08:53) (16 - 16)  SpO2: 96% (13 Sep 2021 08:53) (96% - 97%)  Daily     Daily Weight in k.1 (13 Sep 2021 00:01)        Physical Exam:   Constitutional - NAD, Comfortable, laying comfortably in bed  HEENT - NCAT, EOMI  Neck - Supple  Chest - good chest expansion, good respiratory effort.  Cardio - warm and well perfused, RRR, no murmur. + external cardiac monitor  Abdomen -  Soft, NTND. Normoactive bowel sounds. No epigastric tenderness  Extremities - No peripheral edema, No calf tenderness   Neurologic Exam: AAOx3, neuro stable      Psychiatric - Mood stable, Affect WNL        FUNCTIONAL STATUS:        RECENT LABS:                        13.9   8.55  )-----------( 289      ( 13 Sep 2021 06:30 )             39.5         138  |  105  |  13  ----------------------------<  98  4.0   |  25  |  0.96    Ca    8.5      13 Sep 2021 06:30    TPro  6.6  /  Alb  3.2<L>  /  TBili  0.4  /  DBili  x   /  AST  27  /  ALT  63<H>  /  AlkPhos  72      LIVER FUNCTIONS - ( 13 Sep 2021 06:30 )  Alb: 3.2 g/dL / Pro: 6.6 g/dL / ALK PHOS: 72 U/L / ALT: 63 U/L / AST: 27 U/L / GGT: x                   CAPILLARY BLOOD GLUCOSE            MEDICATIONS:  MEDICATIONS  (STANDING):  aspirin enteric coated 81 milliGRAM(s) Oral daily  atorvastatin 80 milliGRAM(s) Oral at bedtime  clopidogrel Tablet 75 milliGRAM(s) Oral daily  dicyclomine 20 milliGRAM(s) Oral three times a day before meals  enoxaparin Injectable 40 milliGRAM(s) SubCutaneous at bedtime  latanoprost 0.005% Ophthalmic Solution 1 Drop(s) Both EYES at bedtime  mometasone 220 MICROgram(s) Inhaler 1 Puff(s) Inhalation daily  nystatin    Suspension 353003 Unit(s) Oral three times a day  pantoprazole    Tablet 40 milliGRAM(s) Oral every 12 hours    MEDICATIONS  (PRN):  ALBUTerol    90 MICROgram(s) HFA Inhaler 2 Puff(s) Inhalation every 6 hours PRN Shortness of Breath  aluminum hydroxide/magnesium hydroxide/simethicone Suspension 30 milliLiter(s) Oral every 4 hours PRN Dyspepsia  benzocaine 15 mG/menthol 3.6 mG (Sugar-Free) Lozenge 1 Lozenge Oral two times a day PRN Sore Throat  benzonatate 100 milliGRAM(s) Oral three times a day PRN Cough  bisacodyl Suppository 10 milliGRAM(s) Rectal at bedtime PRN Constipation  fluticasone propionate 50 MICROgram(s)/spray Nasal Spray 1 Spray(s) Both Nostrils two times a day PRN nasal congestion  guaiFENesin Oral Liquid (Sugar-Free) 100 milliGRAM(s) Oral every 6 hours PRN Cough  melatonin 6 milliGRAM(s) Oral at bedtime PRN Insomnia  phenol 1.4% (CHLORASEPTIC) Oral Spray 1 Spray(s) Topical three times a day PRN sore throat  simethicone 80 milliGRAM(s) Chew three times a day PRN Gas

## 2021-09-13 NOTE — PROGRESS NOTE ADULT - PROBLEM SELECTOR PLAN 2
multiple loose stool intermittently  ?IBS  will give a trial of bentyl  h. pylori sent results pending

## 2021-09-13 NOTE — PROGRESS NOTE ADULT - ASSESSMENT
86 Year old male with a PMHx of HLD, BPH, History of Supraventricular Tachycardia s/p ablation & Osteoarthritis who presented to  ED on 8/28 with L arm weakness and paraesthesia. + TPA. MRI shows right MCA watershed infarct. Admitted  to acute rehabilitation with left UE monoparesis, sensory impairment, dysarthria, mild cognitive deficits, gait and ADL impairments- pt/ot/dvt ppx

## 2021-09-13 NOTE — PROGRESS NOTE ADULT - ASSESSMENT
Patient is a 86 Year old male with a PMHx of HLD, BPH, History of Supraventricular Tachycardia s/p ablation & Osteoarthritis who presented to  ED on 8/28 with L arm weakness and paraesthesia. + TPA. MRI shows right MCA watershed infarct. Admitted  to acute rehabilitation with left UE monoparesis, sensory impairment, dysarthria, mild cognitive deficits, gait and ADL impairments.      #R MCA infarct  - Continue DAPT per cardio and neuro recs  - Continue atorvastatin  - Lipid panel wnl except HDL 35, HbA1C 5.6  - cont comprehensive rehab program, PT/OT/SLP 3 hours a day, 5 days a week  - Precautions: cardiac, Aspiration, falls    #Loose stools/dyspepsia  -?IBS  -trial of bentyl started 9/13  -H pylori antigen pending  -Hospitalist following    #Cough  - Continue tessalon perle 100mg TID PRN  - Robitussin added PRN but not improving  --d/w hospitalist to f/u--added albuterol inhaler PRN    #HLD   - Continue 80mg Atorvastatin.     #Hiatal hernia--dyspepsia   -simethicone not helping  -increase Protonix 40 mg BID  --ordered stool for H. Pylori-- daughter states pt's GI did not mention that this has been r/o  --d/w hospitalist-     #Hypertension   -BP controlled off medications  - Hospitalist is following    #Nasal congestion/ throat pain  - PRN Flonase.   -Cepacol PRN    #PREET  -Continue to use CPAP at night.     #GI  - Discontinue stool softeners 2/2 loose stools  - Simethicone PRN dyspepsia    #  - continent  --PVR=60    #Diet  - Regular - Carb controlled - DASH/ TLC diet.     #DVT prophylaxis.   - Continue Heparin     # Sleep:  - Melatonin qHS prn    # Skin/Pressure Injury:  - Skin assessment on admission - no pressure ulcers  - Turn every 2 hours while in bed    #Discharge   - IDR - 9/9  SW - Patient lives in [place] & [place]. Commutes back & forth.   OT - e - setup supervision; g/dressing - min A;  CG--bath/toil/shower transf. Goal Mod I-Sup 3 weeks  PT - Trans - CG . Ambulate 100ft with straight cane w close supervision. 12 steps w/ 1HR,-- CG/assist -- Goal Mod I Transf, Sup Amb; CG stairs  SLP - Horse voice. Mild deficits - memory & recall. L visual field difficulties. Reduced cognition. Regular/thin liquid diet, decreased abstract reasoning, working memory, tangential.   Discharge - 9/16       Patient is a 86 Year old male with a PMHx of HLD, BPH, History of Supraventricular Tachycardia s/p ablation & Osteoarthritis who presented to  ED on 8/28 with L arm weakness and paraesthesia. + TPA. MRI shows right MCA watershed infarct. Admitted  to acute rehabilitation with left UE monoparesis, sensory impairment, dysarthria, mild cognitive deficits, gait and ADL impairments.      #R MCA infarct  - Continue DAPT per cardio and neuro recs  - Continue atorvastatin  - Lipid panel wnl except HDL 35, HbA1C 5.6  - cont comprehensive rehab program, PT/OT/SLP 3 hours a day, 5 days a week  - Precautions: cardiac, Aspiration, falls    #Loose stools/dyspepsia  -?IBS  -trial of bentyl started 9/13  -H pylori antigen pending  -d/w Hospitalist     #Cough  --added albuterol inhaler PRN-- cough improving-- rec. pt. to increase to BID  - Continue tessalon perle 100mg TID PRN  -d/c Robitussin PRN as not helping   --d/w hospitalist to f/u--    #HLD   - Continue 80mg Atorvastatin.     #Hiatal hernia--  -simethicone not helping  -increased Protonix 40 mg BID  --ordered stool for H. Pylori-- daughter states pt's GI did not mention that this has been r/o  --d/w hospitalist-     #Hypertension   -BP controlled off medications  - Hospitalist is following    #Nasal congestion/ throat pain  - PRN Flonase.   -Cepacol PRN    #PREET  -Continue to use CPAP at night.     #GI  - Discontinue stool softeners 2/2 loose stools  - Simethicone PRN dyspepsia    #  - continent  --PVR=60    #Diet  - Regular - Carb controlled - DASH/ TLC diet.     #DVT prophylaxis.   - Continue Heparin     # Sleep:  - Melatonin qHS prn    # Skin/Pressure Injury:  - Skin assessment on admission - no pressure ulcers  - Turn every 2 hours while in bed    #Discharge   - IDR - 9/9  SW - Patient lives in [place] & [place]. Commutes back & forth.   OT - e - setup supervision; g/dressing - min A;  CG--bath/toil/shower transf. Goal Mod I-Sup 3 weeks  PT - Trans - CG . Ambulate 100ft with straight cane w close supervision. 12 steps w/ 1HR,-- CG/assist -- Goal Mod I Transf, Sup Amb; CG stairs  SLP - Horse voice. Mild deficits - memory & recall. L visual field difficulties. Reduced cognition. Regular/thin liquid diet, decreased abstract reasoning, working memory, tangential.   Discharge - 9/16

## 2021-09-14 ENCOUNTER — TRANSCRIPTION ENCOUNTER (OUTPATIENT)
Age: 86
End: 2021-09-14

## 2021-09-14 DIAGNOSIS — R05 COUGH: ICD-10-CM

## 2021-09-14 PROCEDURE — 99233 SBSQ HOSP IP/OBS HIGH 50: CPT

## 2021-09-14 PROCEDURE — 99232 SBSQ HOSP IP/OBS MODERATE 35: CPT

## 2021-09-14 RX ORDER — MONTELUKAST 4 MG/1
10 TABLET, CHEWABLE ORAL DAILY
Refills: 0 | Status: DISCONTINUED | OUTPATIENT
Start: 2021-09-14 | End: 2021-09-17

## 2021-09-14 RX ADMIN — ENOXAPARIN SODIUM 40 MILLIGRAM(S): 100 INJECTION SUBCUTANEOUS at 21:04

## 2021-09-14 RX ADMIN — Medication 500000 UNIT(S): at 21:03

## 2021-09-14 RX ADMIN — Medication 20 MILLIGRAM(S): at 12:40

## 2021-09-14 RX ADMIN — MONTELUKAST 10 MILLIGRAM(S): 4 TABLET, CHEWABLE ORAL at 12:40

## 2021-09-14 RX ADMIN — Medication 1 SPRAY(S): at 14:36

## 2021-09-14 RX ADMIN — ATORVASTATIN CALCIUM 80 MILLIGRAM(S): 80 TABLET, FILM COATED ORAL at 21:04

## 2021-09-14 RX ADMIN — PANTOPRAZOLE SODIUM 40 MILLIGRAM(S): 20 TABLET, DELAYED RELEASE ORAL at 17:46

## 2021-09-14 RX ADMIN — Medication 500000 UNIT(S): at 14:36

## 2021-09-14 RX ADMIN — Medication 20 MILLIGRAM(S): at 17:46

## 2021-09-14 RX ADMIN — Medication 20 MILLIGRAM(S): at 06:18

## 2021-09-14 RX ADMIN — CLOPIDOGREL BISULFATE 75 MILLIGRAM(S): 75 TABLET, FILM COATED ORAL at 12:40

## 2021-09-14 RX ADMIN — PANTOPRAZOLE SODIUM 40 MILLIGRAM(S): 20 TABLET, DELAYED RELEASE ORAL at 06:18

## 2021-09-14 RX ADMIN — Medication 81 MILLIGRAM(S): at 12:40

## 2021-09-14 RX ADMIN — LATANOPROST 1 DROP(S): 0.05 SOLUTION/ DROPS OPHTHALMIC; TOPICAL at 21:04

## 2021-09-14 RX ADMIN — Medication 500000 UNIT(S): at 06:19

## 2021-09-14 NOTE — PROGRESS NOTE ADULT - SUBJECTIVE AND OBJECTIVE BOX
Patient is a 86y old  Male who presents with a chief complaint of R MCA CVA (13 Sep 2021 11:41)      HPI:  Patient is a 86 Year old male with a PMHx of HLD, BPH, History of Supraventricular Tachycardia s/p ablation & Osteoarthritis, PREET on home cpap who presented to  ED on 8/28 who woke up from a nap and had L arm weakness and paraesthesia In the ED he had a negative Head CT and was given TPA. MRI of the head was significant for small uncomplicated right MCA watershed infarct. CT angio of brain showed proximal internal carotid artery with at least moderate narrowing but no occlusion. Post TPA Patient continued to have LUE > LLE weakness.   Patient was seen by cardiology who were unable to preform TTE due to probe not being passed successfully. TTE bubble study was negative for any PFO. Also recommended Ecotrin to 81 mg po daily, Plavix 75 mg po daily & Statin. Pt recommended for acute inpatient rehabilitation and was transferred to [facility] on 8/31/2021.            (31 Aug 2021 15:52)        SUBJECTIVE: Patient seen and examined. No acute overnight events, slept well. Patient notes improved abdominal discomfort since starting bentyl. Denies any additional episodes of soft stools since yesterday AM. Still having episodes of dry cough. No other complaints this AM.      REVIEW OF SYMPTOMS  Neurological deficits-- (+) left sided weakness. (+) dry cough  Denies any dizziness, CP, n/v, abdominal pain, diarrhea      VITALS  86y  Vital Signs Last 24 Hrs  T(C): 36.5 (14 Sep 2021 08:05), Max: 36.6 (13 Sep 2021 20:58)  T(F): 97.7 (14 Sep 2021 08:05), Max: 97.9 (13 Sep 2021 20:58)  HR: 67 (14 Sep 2021 08:05) (60 - 75)  BP: 120/74 (14 Sep 2021 08:05) (102/61 - 120/74)  BP(mean): --  RR: 15 (14 Sep 2021 08:05) (14 - 15)  SpO2: 95% (14 Sep 2021 08:05) (95% - 96%)  Daily     Daily         Physical Exam:   Constitutional - NAD, Comfortable, laying comfortably in bed  HEENT - NCAT, EOMI  Chest - good chest expansion, good respiratory effort. CTA b/l, no wheezing, rales, or rhonchi  Cardio - warm and well perfused, RRR, no murmur. + external cardiac monitor  Abdomen -  Soft, NTND. Normoactive bowel sounds. No epigastric tenderness  Extremities - No peripheral edema, No calf tenderness   Neurologic Exam: AAOx3, neuro stable  Psychiatric - Mood stable, Affect WNL        FUNCTIONAL STATUS:        RECENT LABS:                        13.9   8.55  )-----------( 289      ( 13 Sep 2021 06:30 )             39.5     09-13    138  |  105  |  13  ----------------------------<  98  4.0   |  25  |  0.96    Ca    8.5      13 Sep 2021 06:30    TPro  6.6  /  Alb  3.2<L>  /  TBili  0.4  /  DBili  x   /  AST  27  /  ALT  63<H>  /  AlkPhos  72  09-13    LIVER FUNCTIONS - ( 13 Sep 2021 06:30 )  Alb: 3.2 g/dL / Pro: 6.6 g/dL / ALK PHOS: 72 U/L / ALT: 63 U/L / AST: 27 U/L / GGT: x                   CAPILLARY BLOOD GLUCOSE            MEDICATIONS:  MEDICATIONS  (STANDING):  aspirin enteric coated 81 milliGRAM(s) Oral daily  atorvastatin 80 milliGRAM(s) Oral at bedtime  clopidogrel Tablet 75 milliGRAM(s) Oral daily  dicyclomine 20 milliGRAM(s) Oral three times a day before meals  enoxaparin Injectable 40 milliGRAM(s) SubCutaneous at bedtime  latanoprost 0.005% Ophthalmic Solution 1 Drop(s) Both EYES at bedtime  mometasone 220 MICROgram(s) Inhaler 1 Puff(s) Inhalation daily  montelukast 10 milliGRAM(s) Oral daily  nystatin    Suspension 615927 Unit(s) Oral three times a day  pantoprazole    Tablet 40 milliGRAM(s) Oral every 12 hours    MEDICATIONS  (PRN):  ALBUTerol    90 MICROgram(s) HFA Inhaler 2 Puff(s) Inhalation every 6 hours PRN Shortness of Breath  aluminum hydroxide/magnesium hydroxide/simethicone Suspension 30 milliLiter(s) Oral every 4 hours PRN Dyspepsia  benzocaine 15 mG/menthol 3.6 mG (Sugar-Free) Lozenge 1 Lozenge Oral two times a day PRN Sore Throat  benzonatate 100 milliGRAM(s) Oral three times a day PRN Cough  bisacodyl Suppository 10 milliGRAM(s) Rectal at bedtime PRN Constipation  fluticasone propionate 50 MICROgram(s)/spray Nasal Spray 1 Spray(s) Both Nostrils two times a day PRN nasal congestion  guaiFENesin Oral Liquid (Sugar-Free) 100 milliGRAM(s) Oral every 6 hours PRN Cough  melatonin 6 milliGRAM(s) Oral at bedtime PRN Insomnia  phenol 1.4% (CHLORASEPTIC) Oral Spray 1 Spray(s) Topical three times a day PRN sore throat  simethicone 80 milliGRAM(s) Chew three times a day PRN Gas     Patient is a 86y old  Male who presents with a chief complaint of R MCA CVA (13 Sep 2021 11:41)      HPI:  Patient is a 86 Year old male with a PMHx of HLD, BPH, History of Supraventricular Tachycardia s/p ablation & Osteoarthritis, PREET on home cpap who presented to  ED on 8/28 who woke up from a nap and had L arm weakness and paraesthesia In the ED he had a negative Head CT and was given TPA. MRI of the head was significant for small uncomplicated right MCA watershed infarct. CT angio of brain showed proximal internal carotid artery with at least moderate narrowing but no occlusion. Post TPA Patient continued to have LUE > LLE weakness.   Patient was seen by cardiology who were unable to preform TTE due to probe not being passed successfully. TTE bubble study was negative for any PFO. Also recommended Ecotrin to 81 mg po daily, Plavix 75 mg po daily & Statin. Pt recommended for acute inpatient rehabilitation and was transferred to [facility] on 8/31/2021.             SUBJECTIVE: Patient seen and examined. No acute overnight events, slept well. Patient notes improved abdominal discomfort since starting bentyl. Denies any additional episodes of soft stools since yesterday AM. Still having episodes of dry cough. No other complaints this AM.      REVIEW OF SYMPTOMS  Neurological deficits-- (+) left sided weakness. (+) dry cough  Denies any dizziness, CP, n/v, abdominal pain, diarrhea      VITALS  86y  Vital Signs Last 24 Hrs  T(C): 36.5 (14 Sep 2021 08:05), Max: 36.6 (13 Sep 2021 20:58)  T(F): 97.7 (14 Sep 2021 08:05), Max: 97.9 (13 Sep 2021 20:58)  HR: 67 (14 Sep 2021 08:05) (60 - 75)  BP: 120/74 (14 Sep 2021 08:05) (102/61 - 120/74)  BP(mean): --  RR: 15 (14 Sep 2021 08:05) (14 - 15)  SpO2: 95% (14 Sep 2021 08:05) (95% - 96%)  Daily     Daily         Physical Exam:   Constitutional - NAD, Comfortable, laying comfortably in bed  HEENT - NCAT, EOMI  Chest - good chest expansion, good respiratory effort. CTA b/l, no wheezing, rales, or rhonchi  Cardio - warm and well perfused, RRR, no murmur. + external cardiac monitor  Abdomen -  Soft, NTND. Normoactive bowel sounds. No epigastric tenderness  Extremities - No peripheral edema, No calf tenderness   Neurologic Exam: AAOx3, neuro stable  Psychiatric - Mood stable, Affect WNL        FUNCTIONAL STATUS:        RECENT LABS:                        13.9   8.55  )-----------( 289      ( 13 Sep 2021 06:30 )             39.5     09-13    138  |  105  |  13  ----------------------------<  98  4.0   |  25  |  0.96    Ca    8.5      13 Sep 2021 06:30    TPro  6.6  /  Alb  3.2<L>  /  TBili  0.4  /  DBili  x   /  AST  27  /  ALT  63<H>  /  AlkPhos  72  09-13    LIVER FUNCTIONS - ( 13 Sep 2021 06:30 )  Alb: 3.2 g/dL / Pro: 6.6 g/dL / ALK PHOS: 72 U/L / ALT: 63 U/L / AST: 27 U/L / GGT: x                   CAPILLARY BLOOD GLUCOSE            MEDICATIONS:  MEDICATIONS  (STANDING):  aspirin enteric coated 81 milliGRAM(s) Oral daily  atorvastatin 80 milliGRAM(s) Oral at bedtime  clopidogrel Tablet 75 milliGRAM(s) Oral daily  dicyclomine 20 milliGRAM(s) Oral three times a day before meals  enoxaparin Injectable 40 milliGRAM(s) SubCutaneous at bedtime  latanoprost 0.005% Ophthalmic Solution 1 Drop(s) Both EYES at bedtime  mometasone 220 MICROgram(s) Inhaler 1 Puff(s) Inhalation daily  montelukast 10 milliGRAM(s) Oral daily  nystatin    Suspension [id number] Unit(s) Oral three times a day  pantoprazole    Tablet 40 milliGRAM(s) Oral every 12 hours    MEDICATIONS  (PRN):  ALBUTerol    90 MICROgram(s) HFA Inhaler 2 Puff(s) Inhalation every 6 hours PRN Shortness of Breath  aluminum hydroxide/magnesium hydroxide/simethicone Suspension 30 milliLiter(s) Oral every 4 hours PRN Dyspepsia  benzocaine 15 mG/menthol 3.6 mG (Sugar-Free) Lozenge 1 Lozenge Oral two times a day PRN Sore Throat  benzonatate 100 milliGRAM(s) Oral three times a day PRN Cough  bisacodyl Suppository 10 milliGRAM(s) Rectal at bedtime PRN Constipation  fluticasone propionate 50 MICROgram(s)/spray Nasal Spray 1 Spray(s) Both Nostrils two times a day PRN nasal congestion  guaiFENesin Oral Liquid (Sugar-Free) 100 milliGRAM(s) Oral every 6 hours PRN Cough  melatonin 6 milliGRAM(s) Oral at bedtime PRN Insomnia  phenol 1.4% (CHLORASEPTIC) Oral Spray 1 Spray(s) Topical three times a day PRN sore throat  simethicone 80 milliGRAM(s) Chew three times a day PRN Gas

## 2021-09-14 NOTE — PROGRESS NOTE ADULT - PROBLEM SELECTOR PLAN 2
- States he previously was on an inhaled steroid for the same issue in the past but was discontinued because of potential affects on the lungs  - Continue mometasone (Stop after 7 doses)  - Continue albuterol PRN  - will send throat swab   - c/w cough drops prn  - gargles  - stsrt singulair

## 2021-09-14 NOTE — DISCHARGE NOTE PROVIDER - CARE PROVIDER_API CALL
Palla, Venugopal R (MD)  Cardiovascular Disease; Internal Medicine  [address] [place], [place] [address]  Phone: [phone]  Fax: [phone]  Follow Up Time:     Elis Vela  NEUROLOGY - GENERAL  [address] [place] [address], [address]  [place] [address]  Phone: [phone]  Fax: [phone]  Follow Up Time:    Palla, Venugopal R (MD)  Cardiovascular Disease; Internal Medicine  [address] [place], [place] [id number]  Phone: [phone]  Fax: [phone]  Follow Up Time:     Elis Vela  NEUROLOGY - GENERAL  [address] [place] [address], [address]  [place] [address]  Phone: [phone]  Fax: [phone]  Follow Up Time:     Nandini Salas ()  Brain Injury Medicine; PhysicalRehab Medicine  [address] [place] [address]  Phone: [phone]  Fax: [phone]  Follow Up Time:

## 2021-09-14 NOTE — DISCHARGE NOTE PROVIDER - CARE PROVIDERS DIRECT ADDRESSES
,[email or web address]

## 2021-09-14 NOTE — PROGRESS NOTE ADULT - ASSESSMENT
Patient is a 86 Year old male with a PMHx of HLD, BPH, History of Supraventricular Tachycardia s/p ablation & Osteoarthritis who presented to  ED on 8/28 with L arm weakness and paraesthesia. + TPA. MRI shows right MCA watershed infarct. Admitted  to acute rehabilitation with left UE monoparesis, sensory impairment, dysarthria, mild cognitive deficits, gait and ADL impairments.      #R MCA infarct  - Continue DAPT per cardio and neuro recs  - Continue atorvastatin  - Lipid panel wnl except HDL 35, HbA1C 5.6  - cont comprehensive rehab program, PT/OT/SLP 3 hours a day, 5 days a week  - Precautions: cardiac, Aspiration, falls    #Loose stools/dyspepsia  -?IBS  -trial of bentyl started 9/13  -H pylori antigen pending  -d/w Hospitalist   -Loose stools and abdominal discomfort improved 9/14, no further episodes of loose stool since 9/13 AM    #Cough  --added albuterol inhaler PRN-- cough improving-- rec. pt. to increase to BID  - Continue tessalon perle 100mg TID PRN  -d/c Robitussin PRN as not helping   -Cough persistent despite adding albuterol  --d/w hospitalist to f/u--    #HLD   - Continue 80mg Atorvastatin.     #Hiatal hernia--  -simethicone not helping  -increased Protonix 40 mg BID  --ordered stool for H. Pylori-- daughter states pt's GI did not mention that this has been r/o  --d/w hospitalist-     #Hypertension   -BP controlled off medications  - Hospitalist is following    #Nasal congestion/ throat pain  - PRN Flonase.   -Cepacol PRN    #PREET  -Continue to use CPAP at night.     #GI  - Discontinue stool softeners 2/2 loose stools  - Simethicone PRN dyspepsia    #  - continent  --PVR=60    #Diet  - Regular - Carb controlled - DASH/ TLC diet.     #DVT prophylaxis.   - Continue Heparin     # Sleep:  - Melatonin qHS prn    # Skin/Pressure Injury:  - Skin assessment on admission - no pressure ulcers  - Turn every 2 hours while in bed    #Discharge   - IDR - 9/9  SW - Patient lives in [place] & [place]. Commutes back & forth.   OT - e - setup supervision; g/dressing - min A;  CG--bath/toil/shower transf. Goal Mod I-Sup 3 weeks  PT - Trans - CG . Ambulate 100ft with straight cane w close supervision. 12 steps w/ 1HR,-- CG/assist -- Goal Mod I Transf, Sup Amb; CG stairs  SLP - Horse voice. Mild deficits - memory & recall. L visual field difficulties. Reduced cognition. Regular/thin liquid diet, decreased abstract reasoning, working memory, tangential.   Discharge - 9/16 Patient is a 86 Year old male with a PMHx of HLD, BPH, History of Supraventricular Tachycardia s/p ablation & Osteoarthritis who presented to  ED on 8/28 with L arm weakness and paraesthesia. + TPA. MRI shows right MCA watershed infarct. Admitted  to acute rehabilitation with left UE monoparesis, sensory impairment, dysarthria, mild cognitive deficits, gait and ADL impairments.      #R MCA infarct  - Continue DAPT per cardio and neuro recs  - Continue atorvastatin  - Lipid panel wnl except HDL 35, HbA1C 5.6  - cont comprehensive rehab program, PT/OT/SLP 3 hours a day, 5 days a week  - Precautions: cardiac, Aspiration, falls    #Loose stools/dyspepsia  -?IBS  -trial of bentyl started 9/13--  -H pylori antigen pending  -d/w Hospitalist   -Loose stools and abdominal discomfort improved 9/14, no further episodes of loose stool since 9/13 AM    #Cough  -- -Cough persistent despite adding albuterol  --d/w hospitalist  --started on singulair  -- cont. asmanex  --Throat Cx ordered for associated pain/irritation   - Continue tessalon perle 100mg TID PRN       #HLD   - Continue 80mg Atorvastatin.     #Hiatal hernia--  -simethicone not helping  -increased Protonix 40 mg BID  --ordered stool for H. Pylori-- result pending  --d/w hospitalist-     #Hypertension   -BP controlled off medications  - Hospitalist is following    #Nasal congestion/ throat pain  - PRN Flonase.   -Cepacol PRN    #PREET  -Continue to use CPAP at night.     #GI  - Discontinue stool softeners 2/2 loose stools  - Simethicone PRN dyspepsia    #  - continent  --PVR=60    #Diet  - Regular - Carb controlled - DASH/ TLC diet.     #DVT prophylaxis.   - Continue Heparin     # Sleep:  - Melatonin qHS prn    # Skin/Pressure Injury:  - Skin assessment on admission - no pressure ulcers  - Turn every 2 hours while in bed    #Discharge   - IDR - 9/9  SW - Patient lives in [place] & [place]. Commutes back & forth.   OT - e - setup supervision; g/dressing - min A;  CG--bath/toil/shower transf. Goal Mod I-Sup 3 weeks  PT - Trans - CG . Ambulate 100ft with straight cane w close supervision. 12 steps w/ 1HR,-- CG/assist -- Goal Mod I Transf, Sup Amb; CG stairs  SLP - Horse voice. Mild deficits - memory & recall. L visual field difficulties. Reduced cognition. Regular/thin liquid diet, decreased abstract reasoning, working memory, tangential.   Discharge - 9/16

## 2021-09-14 NOTE — DISCHARGE NOTE PROVIDER - NSDCFUSCHEDAPPT_GEN_ALL_CORE_FT
LIZZIE HOLT ; 10/07/2021 ; [facility] [address]  LIZZIE HOLT ; 10/15/2021 ; [facility] Neurology [address] LIZZIE HOLT ; 09/21/2021 ; [facility] Intmed [facility]  LIZZIE HOLT ; 10/07/2021 ; [facility] [address]  LIZZIE HOLT ; 10/15/2021 ; [facility] Neurology [address]

## 2021-09-14 NOTE — DISCHARGE NOTE PROVIDER - PROVIDER TOKENS
PROVIDER:[TOKEN:[[id number]]],PROVIDER:[TOKEN:[[id number]]] PROVIDER:[TOKEN:[[id number]]],PROVIDER:[TOKEN:[[id number]]],PROVIDER:[TOKEN:[[id number]]]

## 2021-09-14 NOTE — DISCHARGE NOTE PROVIDER - HOSPITAL COURSE
HPI:  Patient is a 86 Year old male with a PMHx of HLD, BPH, History of Supraventricular Tachycardia s/p ablation & Osteoarthritis, PREET on home cpap who presented to  ED on 8/28 who woke up from a nap and had L arm weakness and paraesthesia In the ED he had a negative Head CT and was given TPA. MRI of the head was significant for small uncomplicated right MCA watershed infarct. CT angio of brain showed proximal internal carotid artery with at least moderate narrowing but no occlusion. Post TPA Patient continued to have LUE > LLE weakness.   Patient was seen by cardiology who were unable to preform TTE due to probe not being passed successfully. TTE bubble study was negative for any PFO. Also recommended Ecotrin to 81 mg po daily, Plavix 75 mg po daily & Statin. Pt recommended for acute inpatient rehabilitation and was transferred to [facility] on 8/31/2021.      (31 Aug 2021 15:52)    Patient participated in daily therapies and made good functional gains.  Rehab course was complicated by episodes of soft stools and dyspepsia. Patient was started on PRN simethicone with no improvement. Stool H pylori antigen was ordered, which was _____. Patient was started on bentyl for possible IBS and his symptoms improved. Hospital stay was also complicated by a dry cough. Patient was started on flonase in addition to PRN tessalon perle, robitussin, cepacol lozenges, and inhalers.      Patient tolerated course of inpatient PT/OT/SLP rehab with significant functional improvements. Patient seen and examined on day of discharge.  Medications, medication side effects, and discharge instructions were reviewed with the patient, who expressed understanding of all information.  Patient was medically and functionally optimized and cleared for discharge. HPI:  Patient is a 86 Year old male with a PMHx of HLD, BPH, History of Supraventricular Tachycardia s/p ablation & Osteoarthritis, PREET on home cpap who presented to  ED on 8/28 who woke up from a nap and had L arm weakness and paraesthesia In the ED he had a negative Head CT and was given TPA. MRI of the head was significant for small uncomplicated right MCA watershed infarct. CT angio of brain showed proximal internal carotid artery with at least moderate narrowing but no occlusion. Post TPA Patient continued to have LUE > LLE weakness.   Patient was seen by cardiology who were unable to perform TTE due to probe not being passed successfully. TTE bubble study was negative for any PFO. Also recommended Ecotrin to 81 mg po daily, Plavix 75 mg po daily & Statin. Pt recommended for acute inpatient rehabilitation and was transferred to [facility] on 8/31/2021.     Patient participated in daily therapies and made good functional gains.  Rehab course was complicated by episodes of soft stools and dyspepsia. Patient was started on PRN simethicone with no improvement. Stool H pylori antigen was ordered, which was Negative. Patient was started on bentyl for possible IBS and his symptoms improved. Hospital stay was also complicated by a dry cough. Patient was seen by hospitalist and few medications trialed, but determined that patient should continue mometasone inhaler and montelukast.     Patient tolerated course of inpatient PT/OT/SLP rehab with significant functional improvements. Patient seen and examined on day of discharge.  Medications, medication side effects, and discharge instructions were reviewed with the patient, who expressed understanding of all information.  Patient was medically and functionally optimized and cleared for discharge. HPI:  Patient is a 86 Year old male with a PMHx of HLD, BPH, History of Supraventricular Tachycardia s/p ablation & Osteoarthritis, PREET on home cpap who presented to  ED on 8/28 who woke up from a nap and had L arm weakness and paraesthesia In the ED he had a negative Head CT and was given TPA. MRI of the head was significant for small uncomplicated right MCA watershed infarct. CT angio of brain showed proximal internal carotid artery with at least moderate narrowing but no occlusion. Post TPA Patient continued to have LUE > LLE weakness.   Patient was seen by cardiology who were unable to perform TTE due to probe not being passed successfully. TTE bubble study was negative for any PFO. Also recommended Ecotrin to 81 mg po daily, Plavix 75 mg po daily & Statin. Pt recommended for acute inpatient rehabilitation and was transferred to [facility] on 8/31/2021.     Patient participated in daily therapies and made good functional gains.  Rehab course was complicated by episodes of soft stools and dyspepsia. Patient was started on PRN simethicone with no improvement. Stool H pylori antigen was ordered, which was Negative. Patient was started on bentyl for possible IBS and his symptoms improved. Hospital stay was also complicated by a dry cough. Patient was seen by hospitalist and few medications trialed, but determined that patient should continue  montelukast and use dehumidifier at night, losenges, gargles, and nasal saline.     Patient tolerated course of inpatient PT/OT/SLP rehab with significant functional improvements. Patient seen and examined on day of discharge.  Medications, medication side effects, and discharge instructions were reviewed with the patient, who expressed understanding of all information.  Patient was medically and functionally optimized and cleared for discharge.      Discharge Function:   OT - e / g / UBD/ toil transf - setup supervision; LBD - min A;  CG--bath--Standing, and shower transf,  Sup--seated bathing.   PT - Trans -Sup.  Ambulate 150ft without AD w/ supervision. 12 steps w/ 1HR,-- CG --   SLP - Reg/thin; Horse voice. Mod I Comp/PS; Decreased working memory, impaired money management, decreased insight,  Mild deficits - memory,  L visual field difficulties. decreased abstract reasoning,  tangential.

## 2021-09-14 NOTE — PROGRESS NOTE ADULT - SUBJECTIVE AND OBJECTIVE BOX
86y old  Male with a PMHx of HLD, BPH, History of Supraventricular Tachycardia s/p ablation & Osteoarthritis who presented to  ED on 8/28 with L arm weakness and paraesthesia. + TPA. MRI shows right MCA watershed infarct.    Patient seen and examined at bedside.  c/o dry cough since arrival, no relief with cough drops or cough syrup,  will send throat swab, no  loose stools today, no complaints of bloating or  belly pain today,  no n/v, no sob      Vital Signs Last 24 Hrs  T(C): 36.5 (14 Sep 2021 08:05), Max: 36.6 (13 Sep 2021 20:58)  T(F): 97.7 (14 Sep 2021 08:05), Max: 97.9 (13 Sep 2021 20:58)  HR: 67 (14 Sep 2021 08:05) (60 - 75)  BP: 120/74 (14 Sep 2021 08:05) (102/61 - 120/74)  BP(mean): --  RR: 15 (14 Sep 2021 08:05) (14 - 15)  SpO2: 95% (14 Sep 2021 08:05) (95% - 96%)    GENERAL- NAD  EAR/NOSE/MOUTH/THROAT - no pharyngeal exudates, no oral lesions  MMM  EYES- MICHELLE, conjunctiva and Sclera clear  NECK- supple  RESPIRATORY-  clear to auscultation bilaterally, non laboured breathing  CARDIOVASCULAR - SIS2, RRR  GI - soft NT BS present  EXTREMITIES- no pedal edema  NEUROLOGY- no gross focal deficits  PSYCHIATRY- AAO X 3                    13.9                 138  | 25   | 13           8.55  >-----------< 289     ------------------------< 98                    39.5                 4.0  | 105  | 0.96                                         Ca 8.5   Mg x     Ph x            MEDICATIONS  (STANDING):  aspirin enteric coated 81 milliGRAM(s) Oral daily  atorvastatin 80 milliGRAM(s) Oral at bedtime  clopidogrel Tablet 75 milliGRAM(s) Oral daily  dicyclomine 20 milliGRAM(s) Oral three times a day before meals  enoxaparin Injectable 40 milliGRAM(s) SubCutaneous at bedtime  latanoprost 0.005% Ophthalmic Solution 1 Drop(s) Both EYES at bedtime  mometasone 220 MICROgram(s) Inhaler 1 Puff(s) Inhalation daily  montelukast 10 milliGRAM(s) Oral daily  nystatin    Suspension 407028 Unit(s) Oral three times a day  pantoprazole    Tablet 40 milliGRAM(s) Oral every 12 hours    MEDICATIONS  (PRN):  ALBUTerol    90 MICROgram(s) HFA Inhaler 2 Puff(s) Inhalation every 6 hours PRN Shortness of Breath  aluminum hydroxide/magnesium hydroxide/simethicone Suspension 30 milliLiter(s) Oral every 4 hours PRN Dyspepsia  benzocaine 15 mG/menthol 3.6 mG (Sugar-Free) Lozenge 1 Lozenge Oral two times a day PRN Sore Throat  benzonatate 100 milliGRAM(s) Oral three times a day PRN Cough  bisacodyl Suppository 10 milliGRAM(s) Rectal at bedtime PRN Constipation  fluticasone propionate 50 MICROgram(s)/spray Nasal Spray 1 Spray(s) Both Nostrils two times a day PRN nasal congestion  guaiFENesin Oral Liquid (Sugar-Free) 100 milliGRAM(s) Oral every 6 hours PRN Cough  melatonin 6 milliGRAM(s) Oral at bedtime PRN Insomnia  phenol 1.4% (CHLORASEPTIC) Oral Spray 1 Spray(s) Topical three times a day PRN sore throat  simethicone 80 milliGRAM(s) Chew three times a day PRN Gas

## 2021-09-14 NOTE — DISCHARGE NOTE PROVIDER - NSDCCPCAREPLAN_GEN_ALL_CORE_FT
PRINCIPAL DISCHARGE DIAGNOSIS  Diagnosis: Cerebrovascular accident (CVA)  Assessment and Plan of Treatment: You were admitted to [facility] for acute inpatient rehabilitation for weakness, speech difficulty, and cognitive impairments after your stroke. You participated in physical, occupational, and speech therapy during your stay here and made significant improvements.  -Continue taking aspirin 81mg, plavix 81mg, and atorvastatin 80mg daily      SECONDARY DISCHARGE DIAGNOSES  Diagnosis: Loose stools  Assessment and Plan of Treatment: You had episodes of loose stool during your hospital stay. This was possibly due to irritable bowel syndrome (IBS). You were started on a medication called bentyl and your symptoms improved  -Start taking bentyl ???????  -Follow up with your primary care doctor within 1 month of discharge    Diagnosis: Dry cough  Assessment and Plan of Treatment:      PRINCIPAL DISCHARGE DIAGNOSIS  Diagnosis: Cerebrovascular accident (CVA)  Assessment and Plan of Treatment: You were admitted to [facility] for acute inpatient rehabilitation for weakness, speech difficulty, and cognitive impairments after your stroke. You participated in physical, occupational, and speech therapy during your stay here and made significant improvements.  -Continue taking aspirin 81mg, plavix 81mg, and atorvastatin 80mg daily      SECONDARY DISCHARGE DIAGNOSES  Diagnosis: Loose stools  Assessment and Plan of Treatment: You had episodes of loose stool during your hospital stay. This was possibly due to irritable bowel syndrome (IBS). You were started on a medication called bentyl and your symptoms improved  -Start taking bentyl 20mg three times a day before meals  -Follow up with your primary care doctor within 1 month of discharge    Diagnosis: Dry cough  Assessment and Plan of Treatment: Continue taking symbicort 10mg daily and asmanex 1 puff daily  Take albuterol as needed  Purchase a cough medication over the counter to take as needed    Diagnosis: Glaucoma  Assessment and Plan of Treatment: Continue your home latanoprost eye drops     PRINCIPAL DISCHARGE DIAGNOSIS  Diagnosis: Cerebrovascular accident (CVA)  Assessment and Plan of Treatment: You were admitted to [facility] for acute inpatient rehabilitation for weakness, speech difficulty, and cognitive impairments after your stroke. You participated in physical, occupational, and speech therapy during your stay here and made significant improvements.  -Continue taking aspirin 81mg, plavix 81mg, and atorvastatin 80mg daily      SECONDARY DISCHARGE DIAGNOSES  Diagnosis: Loose stools  Assessment and Plan of Treatment: You had episodes of loose stool during your hospital stay. This was possibly due to irritable bowel syndrome (IBS). You were started on a medication called bentyl and your symptoms improved  -Start taking bentyl 20mg three times a day before meals  -Follow up with your primary care doctor within 1 month of discharge    Diagnosis: Dry cough  Assessment and Plan of Treatment: Continue taking montelukast 10mg daily.  Use Saline Nasal Spray as needed throughout the day and Dehumidifier at night.       Diagnosis: Glaucoma  Assessment and Plan of Treatment: Continue your home latanoprost eye drops

## 2021-09-14 NOTE — DISCHARGE NOTE PROVIDER - NSDCMRMEDTOKEN_GEN_ALL_CORE_FT
atorvastatin 80 mg oral tablet: 1 tab(s) orally once a day (at bedtime)  latanoprost 0.005% ophthalmic solution: 1 drop(s) to each affected eye once a day (at bedtime)   latanoprost 0.005% ophthalmic solution: 1 drop(s) to each affected eye once a day (at bedtime)   albuterol 90 mcg/inh inhalation aerosol: 2 puff(s) inhaled every 6 hours, As Needed -Shortness of Breath - for cough   Aspirin Low Dose 81 mg oral delayed release tablet: 1 tab(s) orally once a day  atorvastatin 80 mg oral tablet: 1 tab(s) orally once a day (at bedtime)  dicyclomine 10 mg oral capsule: 2 cap(s) orally 3 times a day (before meals)  latanoprost 0.005% ophthalmic solution: 1 drop(s) to each affected eye once a day (at bedtime)  mometasone 220 mcg/inh inhalation aerosol powder: 1 puff(s) inhaled once a day   montelukast 10 mg oral tablet: 1 tab(s) orally once a day  pantoprazole 40 mg oral delayed release tablet: 1 tab(s) orally once a day  Plavix 75 mg oral tablet: 1 tab(s) orally once a day    Aspirin Low Dose 81 mg oral delayed release tablet: 1 tab(s) orally once a day  atorvastatin 80 mg oral tablet: 1 tab(s) orally once a day (at bedtime)  dicyclomine 10 mg oral capsule: 2 cap(s) orally 3 times a day (before meals)  latanoprost 0.005% ophthalmic solution: 1 drop(s) to each affected eye once a day (at bedtime)  montelukast 10 mg oral tablet: 1 tab(s) orally once a day  pantoprazole 40 mg oral delayed release tablet: 1 tab(s) orally once a day  Plavix 75 mg oral tablet: 1 tab(s) orally once a day   sodium chloride 0.2% nasal spray: 1 spray(s) intranasally every 4 hours, As Needed

## 2021-09-15 ENCOUNTER — TRANSCRIPTION ENCOUNTER (OUTPATIENT)
Age: 86
End: 2021-09-15

## 2021-09-15 LAB — H PYLORI AG STL QL: SIGNIFICANT CHANGE UP

## 2021-09-15 PROCEDURE — 99233 SBSQ HOSP IP/OBS HIGH 50: CPT

## 2021-09-15 RX ORDER — PANTOPRAZOLE SODIUM 20 MG/1
40 TABLET, DELAYED RELEASE ORAL DAILY
Refills: 0 | Status: DISCONTINUED | OUTPATIENT
Start: 2021-09-15 | End: 2021-09-17

## 2021-09-15 RX ADMIN — Medication 500000 UNIT(S): at 21:06

## 2021-09-15 RX ADMIN — MONTELUKAST 10 MILLIGRAM(S): 4 TABLET, CHEWABLE ORAL at 12:32

## 2021-09-15 RX ADMIN — PANTOPRAZOLE SODIUM 40 MILLIGRAM(S): 20 TABLET, DELAYED RELEASE ORAL at 06:10

## 2021-09-15 RX ADMIN — LATANOPROST 1 DROP(S): 0.05 SOLUTION/ DROPS OPHTHALMIC; TOPICAL at 21:06

## 2021-09-15 RX ADMIN — Medication 20 MILLIGRAM(S): at 12:32

## 2021-09-15 RX ADMIN — ATORVASTATIN CALCIUM 80 MILLIGRAM(S): 80 TABLET, FILM COATED ORAL at 21:07

## 2021-09-15 RX ADMIN — MOMETASONE FUROATE 1 PUFF(S): 220 INHALANT RESPIRATORY (INHALATION) at 08:44

## 2021-09-15 RX ADMIN — Medication 20 MILLIGRAM(S): at 06:10

## 2021-09-15 RX ADMIN — SIMETHICONE 80 MILLIGRAM(S): 80 TABLET, CHEWABLE ORAL at 06:16

## 2021-09-15 RX ADMIN — Medication 81 MILLIGRAM(S): at 12:32

## 2021-09-15 RX ADMIN — CLOPIDOGREL BISULFATE 75 MILLIGRAM(S): 75 TABLET, FILM COATED ORAL at 12:32

## 2021-09-15 RX ADMIN — Medication 20 MILLIGRAM(S): at 16:22

## 2021-09-15 RX ADMIN — Medication 500000 UNIT(S): at 06:10

## 2021-09-15 RX ADMIN — Medication 500000 UNIT(S): at 14:28

## 2021-09-15 RX ADMIN — ENOXAPARIN SODIUM 40 MILLIGRAM(S): 100 INJECTION SUBCUTANEOUS at 21:07

## 2021-09-15 NOTE — PROGRESS NOTE ADULT - PROBLEM SELECTOR PLAN 2
- States he previously was on an inhaled steroid for the same issue in the past but was discontinued because of potential affects on the lungs  - Continue mometasone (Stop after 7 doses)  - Continue albuterol PRN  - will send throat swab   - c/w cough drops prn  - gargles  - c/w singular

## 2021-09-15 NOTE — PROGRESS NOTE ADULT - SUBJECTIVE AND OBJECTIVE BOX
HPI:  Patient is a 86 Year old male with a PMHx of HLD, BPH, History of Supraventricular Tachycardia s/p ablation & Osteoarthritis, PREET on home cpap who presented to  ED on 8/28 who woke up from a nap and had L arm weakness and paraesthesia In the ED he had a negative Head CT and was given TPA. MRI of the head was significant for small uncomplicated right MCA watershed infarct. CT angio of brain showed proximal internal carotid artery with at least moderate narrowing but no occlusion. Post TPA Patient continued to have LUE > LLE weakness.   Patient was seen by cardiology who were unable to preform TTE due to probe not being passed successfully. TTE bubble study was negative for any PFO. Also recommended Ecotrin to 81 mg po daily, Plavix 75 mg po daily & Statin. Pt recommended for acute inpatient rehabilitation and was transferred to [facility] on 8/31/2021.            (31 Aug 2021 15:52)      PAST MEDICAL & SURGICAL HISTORY:  GERD (Gastroesophageal Reflux Disease)    Hyperlipidemia    Benign Prostatic Hypertrophy    Diverticulosis    Osteoarthritis    Osteoporosis    History of Supraventricular Tachycardia  s/p ablation    History of Cholecystectomy    History of Prior Ablation Treatment    S/P Cataract Surgery  left eye        Subjective:  No abdominal pain-- reports having some gas, but bloating better.  Cough improved.       VITALS  Vital Signs Last 24 Hrs  T(C): 36.7 (15 Sep 2021 09:13), Max: 36.7 (14 Sep 2021 21:02)  T(F): 98 (15 Sep 2021 09:13), Max: 98 (14 Sep 2021 21:02)  HR: 68 (15 Sep 2021 09:13) (63 - 86)  BP: 113/73 (15 Sep 2021 09:13) (110/68 - 113/73)  BP(mean): --  RR: 15 (15 Sep 2021 09:13) (14 - 15)  SpO2: 96% (15 Sep 2021 09:13) (96% - 99%)    REVIEW OF SYMPTOMS  Neurological deficits-- (+) left sided weakness. (+) dry cough  Denies any dizziness, CP, n/v, abdominal pain, diarrhea        Physical Exam:   Constitutional - NAD, Comfortable, laying comfortably in bed  HEENT - NCAT, EOMI  Chest -breathing comfortably on RA  Cardio - warm and well perfused, RRR, + external cardiac monitor  Abdomen -  Soft, NTND. Normoactive bowel sounds. No epigastric tenderness  Extremities - No peripheral edema, No calf tenderness   Neurologic Exam: AAOx3, left UE 4/5, Coordination FNF on left impaired  Psychiatric - Mood stable, Affect WNL    RECENT LABS                  RADIOLOGY/OTHER RESULTS      MEDICATIONS  (STANDING):  aspirin enteric coated 81 milliGRAM(s) Oral daily  atorvastatin 80 milliGRAM(s) Oral at bedtime  clopidogrel Tablet 75 milliGRAM(s) Oral daily  dicyclomine 20 milliGRAM(s) Oral three times a day before meals  enoxaparin Injectable 40 milliGRAM(s) SubCutaneous at bedtime  latanoprost 0.005% Ophthalmic Solution 1 Drop(s) Both EYES at bedtime  mometasone 220 MICROgram(s) Inhaler 1 Puff(s) Inhalation daily  montelukast 10 milliGRAM(s) Oral daily  nystatin    Suspension 442512 Unit(s) Oral three times a day  pantoprazole    Tablet 40 milliGRAM(s) Oral every 12 hours    MEDICATIONS  (PRN):  ALBUTerol    90 MICROgram(s) HFA Inhaler 2 Puff(s) Inhalation every 6 hours PRN Shortness of Breath  aluminum hydroxide/magnesium hydroxide/simethicone Suspension 30 milliLiter(s) Oral every 4 hours PRN Dyspepsia  benzocaine 15 mG/menthol 3.6 mG (Sugar-Free) Lozenge 1 Lozenge Oral two times a day PRN Sore Throat  benzonatate 100 milliGRAM(s) Oral three times a day PRN Cough  bisacodyl Suppository 10 milliGRAM(s) Rectal at bedtime PRN Constipation  fluticasone propionate 50 MICROgram(s)/spray Nasal Spray 1 Spray(s) Both Nostrils two times a day PRN nasal congestion  guaiFENesin Oral Liquid (Sugar-Free) 100 milliGRAM(s) Oral every 6 hours PRN Cough  melatonin 6 milliGRAM(s) Oral at bedtime PRN Insomnia  phenol 1.4% (CHLORASEPTIC) Oral Spray 1 Spray(s) Topical three times a day PRN sore throat  simethicone 80 milliGRAM(s) Chew three times a day PRN Gas         HPI:  Patient is a 86 Year old male with a PMHx of HLD, BPH, History of Supraventricular Tachycardia s/p ablation & Osteoarthritis, PREET on home cpap who presented to  ED on 8/28 who woke up from a nap and had L arm weakness and paraesthesia In the ED he had a negative Head CT and was given TPA. MRI of the head was significant for small uncomplicated right MCA watershed infarct. CT angio of brain showed proximal internal carotid artery with at least moderate narrowing but no occlusion. Post TPA Patient continued to have LUE > LLE weakness.   Patient was seen by cardiology who were unable to preform TTE due to probe not being passed successfully. TTE bubble study was negative for any PFO. Also recommended Ecotrin to 81 mg po daily, Plavix 75 mg po daily & Statin. Pt recommended for acute inpatient rehabilitation and was transferred to [facility] on 8/31/2021.           PAST MEDICAL & SURGICAL HISTORY:  GERD (Gastroesophageal Reflux Disease)    Hyperlipidemia    Benign Prostatic Hypertrophy    Diverticulosis    Osteoarthritis    Osteoporosis    History of Supraventricular Tachycardia  s/p ablation    History of Cholecystectomy    History of Prior Ablation Treatment    S/P Cataract Surgery  left eye        Subjective:  No abdominal pain-- reports having some gas, but bloating better.  Cough improved.       VITALS  Vital Signs Last 24 Hrs  T(C): 36.7 (15 Sep 2021 09:13), Max: 36.7 (14 Sep 2021 21:02)  T(F): 98 (15 Sep 2021 09:13), Max: 98 (14 Sep 2021 21:02)  HR: 68 (15 Sep 2021 09:13) (63 - 86)  BP: 113/73 (15 Sep 2021 09:13) (110/68 - 113/73)  BP(mean): --  RR: 15 (15 Sep 2021 09:13) (14 - 15)  SpO2: 96% (15 Sep 2021 09:13) (96% - 99%)    REVIEW OF SYMPTOMS  Neurological deficits-- (+) left sided weakness. (+) dry cough  Denies any dizziness, CP, n/v, abdominal pain, diarrhea        Physical Exam:   Constitutional - NAD, Comfortable, laying comfortably in bed  HEENT - NCAT, EOMI  Chest -breathing comfortably on RA  Cardio - warm and well perfused, RRR, + external cardiac monitor  Abdomen -  Soft, NTND. Normoactive bowel sounds. No epigastric tenderness  Extremities - No peripheral edema, No calf tenderness   Neurologic Exam: AAOx3, left UE 4/5, Coordination FNF on left impaired  Psychiatric - Mood stable, Affect WNL    RECENT LABS                  RADIOLOGY/OTHER RESULTS      MEDICATIONS  (STANDING):  aspirin enteric coated 81 milliGRAM(s) Oral daily  atorvastatin 80 milliGRAM(s) Oral at bedtime  clopidogrel Tablet 75 milliGRAM(s) Oral daily  dicyclomine 20 milliGRAM(s) Oral three times a day before meals  enoxaparin Injectable 40 milliGRAM(s) SubCutaneous at bedtime  latanoprost 0.005% Ophthalmic Solution 1 Drop(s) Both EYES at bedtime  mometasone 220 MICROgram(s) Inhaler 1 Puff(s) Inhalation daily  montelukast 10 milliGRAM(s) Oral daily  nystatin    Suspension [id number] Unit(s) Oral three times a day  pantoprazole    Tablet 40 milliGRAM(s) Oral every 12 hours    MEDICATIONS  (PRN):  ALBUTerol    90 MICROgram(s) HFA Inhaler 2 Puff(s) Inhalation every 6 hours PRN Shortness of Breath  aluminum hydroxide/magnesium hydroxide/simethicone Suspension 30 milliLiter(s) Oral every 4 hours PRN Dyspepsia  benzocaine 15 mG/menthol 3.6 mG (Sugar-Free) Lozenge 1 Lozenge Oral two times a day PRN Sore Throat  benzonatate 100 milliGRAM(s) Oral three times a day PRN Cough  bisacodyl Suppository 10 milliGRAM(s) Rectal at bedtime PRN Constipation  fluticasone propionate 50 MICROgram(s)/spray Nasal Spray 1 Spray(s) Both Nostrils two times a day PRN nasal congestion  guaiFENesin Oral Liquid (Sugar-Free) 100 milliGRAM(s) Oral every 6 hours PRN Cough  melatonin 6 milliGRAM(s) Oral at bedtime PRN Insomnia  phenol 1.4% (CHLORASEPTIC) Oral Spray 1 Spray(s) Topical three times a day PRN sore throat  simethicone 80 milliGRAM(s) Chew three times a day PRN Gas

## 2021-09-15 NOTE — PROGRESS NOTE ADULT - SUBJECTIVE AND OBJECTIVE BOX
86y old  Male with a PMHx of HLD, BPH, History of Supraventricular Tachycardia s/p ablation & Osteoarthritis who presented to  ED on 8/28 with L arm weakness and paraesthesia. + TPA. MRI shows right MCA watershed infarct.    Patient seen and examined at bedside.  still c/o dry cough but feels it is mildly improved, throat swab pending,   had 1 soft stools today  feels bloating has improved, still feels a little gassy  no n/v, no sob      Vital Signs Last 24 Hrs  T(C): 36.7 (15 Sep 2021 09:13), Max: 36.7 (14 Sep 2021 21:02)  T(F): 98 (15 Sep 2021 09:13), Max: 98 (14 Sep 2021 21:02)  HR: 68 (15 Sep 2021 09:13) (63 - 86)  BP: 113/73 (15 Sep 2021 09:13) (110/68 - 113/73)  BP(mean): --  RR: 15 (15 Sep 2021 09:13) (14 - 15)  SpO2: 96% (15 Sep 2021 09:13) (96% - 99%)      GENERAL- NAD  EAR/NOSE/MOUTH/THROAT - no pharyngeal exudates, no oral lesions  MMM  EYES- MICHELLE, conjunctiva and Sclera clear  NECK- supple  RESPIRATORY-  clear to auscultation bilaterally, non laboured breathing  CARDIOVASCULAR - SIS2, RRR  GI - soft NT BS present  EXTREMITIES- no pedal edema  NEUROLOGY- no gross focal deficits  PSYCHIATRY- AAO X 3          MEDICATIONS  (STANDING):  aspirin enteric coated 81 milliGRAM(s) Oral daily  atorvastatin 80 milliGRAM(s) Oral at bedtime  clopidogrel Tablet 75 milliGRAM(s) Oral daily  dicyclomine 20 milliGRAM(s) Oral three times a day before meals  enoxaparin Injectable 40 milliGRAM(s) SubCutaneous at bedtime  latanoprost 0.005% Ophthalmic Solution 1 Drop(s) Both EYES at bedtime  mometasone 220 MICROgram(s) Inhaler 1 Puff(s) Inhalation daily  montelukast 10 milliGRAM(s) Oral daily  nystatin    Suspension 240505 Unit(s) Oral three times a day  pantoprazole    Tablet 40 milliGRAM(s) Oral every 12 hours    MEDICATIONS  (PRN):  ALBUTerol    90 MICROgram(s) HFA Inhaler 2 Puff(s) Inhalation every 6 hours PRN Shortness of Breath  aluminum hydroxide/magnesium hydroxide/simethicone Suspension 30 milliLiter(s) Oral every 4 hours PRN Dyspepsia  benzocaine 15 mG/menthol 3.6 mG (Sugar-Free) Lozenge 1 Lozenge Oral two times a day PRN Sore Throat  benzonatate 100 milliGRAM(s) Oral three times a day PRN Cough  bisacodyl Suppository 10 milliGRAM(s) Rectal at bedtime PRN Constipation  fluticasone propionate 50 MICROgram(s)/spray Nasal Spray 1 Spray(s) Both Nostrils two times a day PRN nasal congestion  guaiFENesin Oral Liquid (Sugar-Free) 100 milliGRAM(s) Oral every 6 hours PRN Cough  melatonin 6 milliGRAM(s) Oral at bedtime PRN Insomnia  phenol 1.4% (CHLORASEPTIC) Oral Spray 1 Spray(s) Topical three times a day PRN sore throat  simethicone 80 milliGRAM(s) Chew three times a day PRN Gas

## 2021-09-15 NOTE — CHART NOTE - NSCHARTNOTEFT_GEN_A_CORE
Nutrition Follow Up Note  Hospital Course (Per Electronic Medical Record): 86 Year old male with a PMHx of HLD, BPH, History of Supraventricular Tachycardia s/p ablation & Osteoarthritis who presented to  ED on 8/28 with L arm weakness and paraesthesia. + TPA. MRI shows right MCA watershed infarct. Admitted  to acute rehabilitation with left UE monoparesis, sensory impairment, dysarthria, mild cognitive deficits, gait and ADL impairments  Source: Medical Record [X] Patient [X] Family [ ]         Diet: Regular, DASH/TLC  Pt tolerating diet with good PO intake/appetite per pt; observed today during lunch had fair intake of salad. Pt continues to c/o gas and loose stools. Additional fruit and prune juice @ breakfast were taken off pt's menu due to possibly too much fiber for pt.    Enteral/Parenteral Nutrition: N/A    Current Weight: 187.3 lbs (9/14)  175.4 lbs (9/8) weight fluctuations noted - recommend zeroing bed scale when pt OOB  187.6 lbs (9/6)  176.3 lbs (9/5)      Pertinent Medications: MEDICATIONS  (STANDING):  aspirin enteric coated 81 milliGRAM(s) Oral daily  atorvastatin 80 milliGRAM(s) Oral at bedtime  clopidogrel Tablet 75 milliGRAM(s) Oral daily  dicyclomine 20 milliGRAM(s) Oral three times a day before meals  enoxaparin Injectable 40 milliGRAM(s) SubCutaneous at bedtime  latanoprost 0.005% Ophthalmic Solution 1 Drop(s) Both EYES at bedtime  mometasone 220 MICROgram(s) Inhaler 1 Puff(s) Inhalation daily  montelukast 10 milliGRAM(s) Oral daily  nystatin    Suspension 990171 Unit(s) Oral three times a day  pantoprazole    Tablet 40 milliGRAM(s) Oral every 12 hours    MEDICATIONS  (PRN):  ALBUTerol    90 MICROgram(s) HFA Inhaler 2 Puff(s) Inhalation every 6 hours PRN Shortness of Breath  aluminum hydroxide/magnesium hydroxide/simethicone Suspension 30 milliLiter(s) Oral every 4 hours PRN Dyspepsia  benzocaine 15 mG/menthol 3.6 mG (Sugar-Free) Lozenge 1 Lozenge Oral two times a day PRN Sore Throat  benzonatate 100 milliGRAM(s) Oral three times a day PRN Cough  bisacodyl Suppository 10 milliGRAM(s) Rectal at bedtime PRN Constipation  fluticasone propionate 50 MICROgram(s)/spray Nasal Spray 1 Spray(s) Both Nostrils two times a day PRN nasal congestion  guaiFENesin Oral Liquid (Sugar-Free) 100 milliGRAM(s) Oral every 6 hours PRN Cough  melatonin 6 milliGRAM(s) Oral at bedtime PRN Insomnia  phenol 1.4% (CHLORASEPTIC) Oral Spray 1 Spray(s) Topical three times a day PRN sore throat  simethicone 80 milliGRAM(s) Chew three times a day PRN Gas      Pertinent Labs:  09-13 Na138 mmol/L Glu 98 mg/dL K+ 4.0 mmol/L Cr  0.96 mg/dL BUN 13 mg/dL 09-13 Alb 3.2 g/dL<L> 08-29 Chol 124 mg/dL LDL --    HDL 35 mg/dL<L> Trig 78 mg/dL        Skin: skin tear per nursing flow sheets    Edema: No edema per nursing flow sheets     Last BM: on 9/13 Per nursing flowsheets     Estimated Needs:   [X] No Change since Previous Assessment  [ ] Recalculated:     Previous Nutrition Diagnosis:   Overweight/Obesity    Nutrition Diagnosis is [X] Ongoing    [ ] Resolved   [ ] Not Applicable      New Nutrition Diagnosis: [X] Not Applicable  [ ] Inadequate Protein Energy Intake   [ ] Inadequate Oral Intake   [ ] Excessive Energy Intake   [ ] Increased Nutrient Needs   [ ] Obesity   [ ] Altered GI Function   [ ] Unintended Weight Loss   [ ] Food & Nutrition Related Knowledge Deficit  [ ] Limited Adherence to nutrition related recommendations   [ ] Malnutrition      Interventions:   1. Recommend continuing with current plan of care    Monitoring & Evaluation:   [X] Weights   [X] PO Intake   [X] Follow Up (Per Protocol)  [X] Tolerance to Diet Prescription   [X] Other: Labs     RD Remains Available.  Mehnaz Portillo RD

## 2021-09-15 NOTE — PROGRESS NOTE ADULT - ASSESSMENT
Patient is a 86 Year old male with a PMHx of HLD, BPH, History of Supraventricular Tachycardia s/p ablation & Osteoarthritis who presented to  ED on 8/28 with L arm weakness and paraesthesia. + TPA. MRI shows right MCA watershed infarct. Admitted  to acute rehabilitation with left UE monoparesis, sensory impairment, dysarthria, mild cognitive deficits, gait and ADL impairments.      #R MCA infarct  - Continue DAPT per cardio and neuro recs  - Continue atorvastatin  - Lipid panel wnl except HDL 35, HbA1C 5.6  - cont comprehensive rehab program, PT/OT/SLP 3 hours a day, 5 days a week  - Precautions: cardiac, Aspiration, falls    #Loose stools/dyspepsia  -?IBS  -cont. bentyl-- started 9/13--  -H pylori antigen- Neg  -d/w Hospitalist   -Loose stools and abdominal discomfort improved     #Cough  -- improving  --d/w hospitalist  --cont. singulair  -- cont. asmanex  --Throat Cx ordered for associated pain/irritation --pending  - Continue tessalon perle 100mg TID PRN       #HLD   - Continue 80mg Atorvastatin.     #Hiatal hernia--  -simethicone not helping  -change Protonix 40 mg daily  --ordered stool for H. Pylori--neg.  --d/w hospitalist-     #Hypertension   -BP controlled off medications  - Hospitalist is following      #PREET  -Continue to use CPAP at night.     #GI  - Discontinue stool softeners 2/2 loose stools  - Simethicone PRN dyspepsia    #  - continent  --PVR=60    #Diet  - Regular - Carb controlled - DASH/ TLC diet.     #DVT prophylaxis.   - Continue Heparin     # Sleep:  - Melatonin qHS prn    # Skin/Pressure Injury:  - Skin assessment on admission - no pressure ulcers  - Turn every 2 hours while in bed    #Discharge   - IDR - 9/9  SW - Patient lives in [place] & [place]. Commutes back & forth.   OT - e - setup supervision; g/dressing - min A;  CG--bath/toil/shower transf. Goal Mod I-Sup 3 weeks  PT - Trans - CG . Ambulate 100ft with straight cane w close supervision. 12 steps w/ 1HR,-- CG/assist -- Goal Mod I Transf, Sup Amb; CG stairs  SLP - Horse voice. Mild deficits - memory & recall. L visual field difficulties. Reduced cognition. Regular/thin liquid diet, decreased abstract reasoning, working memory, tangential.   Discharge - 9/16

## 2021-09-15 NOTE — DISCHARGE NOTE NURSING/CASE MANAGEMENT/SOCIAL WORK - PATIENT PORTAL LINK FT
You can access the FollowMyHealth Patient Portal offered by [facility] by registering at the following website: [email or web address]. By joining [employer]’s FollowMyHealth portal, you will also be able to view your health information using other applications (apps) compatible with our system.

## 2021-09-16 LAB
ALBUMIN SERPL ELPH-MCNC: 3.5 G/DL — SIGNIFICANT CHANGE UP (ref 3.3–5)
ALP SERPL-CCNC: 81 U/L — SIGNIFICANT CHANGE UP (ref 40–120)
ALT FLD-CCNC: 66 U/L — HIGH (ref 10–45)
ANION GAP SERPL CALC-SCNC: 7 MMOL/L — SIGNIFICANT CHANGE UP (ref 5–17)
AST SERPL-CCNC: 31 U/L — SIGNIFICANT CHANGE UP (ref 10–40)
BILIRUB SERPL-MCNC: 0.5 MG/DL — SIGNIFICANT CHANGE UP (ref 0.2–1.2)
BUN SERPL-MCNC: 17 MG/DL — SIGNIFICANT CHANGE UP (ref 7–23)
CALCIUM SERPL-MCNC: 8.9 MG/DL — SIGNIFICANT CHANGE UP (ref 8.4–10.5)
CHLORIDE SERPL-SCNC: 102 MMOL/L — SIGNIFICANT CHANGE UP (ref 96–108)
CO2 SERPL-SCNC: 29 MMOL/L — SIGNIFICANT CHANGE UP (ref 22–31)
CREAT SERPL-MCNC: 1.15 MG/DL — SIGNIFICANT CHANGE UP (ref 0.5–1.3)
CULTURE RESULTS: SIGNIFICANT CHANGE UP
GLUCOSE SERPL-MCNC: 105 MG/DL — HIGH (ref 70–99)
HCT VFR BLD CALC: 40.6 % — SIGNIFICANT CHANGE UP (ref 39–50)
HGB BLD-MCNC: 14.1 G/DL — SIGNIFICANT CHANGE UP (ref 13–17)
MCHC RBC-ENTMCNC: 33.5 PG — SIGNIFICANT CHANGE UP (ref 27–34)
MCHC RBC-ENTMCNC: 34.7 GM/DL — SIGNIFICANT CHANGE UP (ref 32–36)
MCV RBC AUTO: 96.4 FL — SIGNIFICANT CHANGE UP (ref 80–100)
NRBC # BLD: 0 /100 WBCS — SIGNIFICANT CHANGE UP (ref 0–0)
PLATELET # BLD AUTO: 293 K/UL — SIGNIFICANT CHANGE UP (ref 150–400)
POTASSIUM SERPL-MCNC: 4.3 MMOL/L — SIGNIFICANT CHANGE UP (ref 3.5–5.3)
POTASSIUM SERPL-SCNC: 4.3 MMOL/L — SIGNIFICANT CHANGE UP (ref 3.5–5.3)
PROT SERPL-MCNC: 6.9 G/DL — SIGNIFICANT CHANGE UP (ref 6–8.3)
RBC # BLD: 4.21 M/UL — SIGNIFICANT CHANGE UP (ref 4.2–5.8)
RBC # FLD: 12.8 % — SIGNIFICANT CHANGE UP (ref 10.3–14.5)
SODIUM SERPL-SCNC: 138 MMOL/L — SIGNIFICANT CHANGE UP (ref 135–145)
SPECIMEN SOURCE: SIGNIFICANT CHANGE UP
WBC # BLD: 7.29 K/UL — SIGNIFICANT CHANGE UP (ref 3.8–10.5)
WBC # FLD AUTO: 7.29 K/UL — SIGNIFICANT CHANGE UP (ref 3.8–10.5)

## 2021-09-16 PROCEDURE — 99233 SBSQ HOSP IP/OBS HIGH 50: CPT

## 2021-09-16 PROCEDURE — 99232 SBSQ HOSP IP/OBS MODERATE 35: CPT

## 2021-09-16 RX ORDER — MONTELUKAST 4 MG/1
1 TABLET, CHEWABLE ORAL
Qty: 30 | Refills: 0
Start: 2021-09-16 | End: 2021-10-15

## 2021-09-16 RX ORDER — ALBUTEROL 90 UG/1
2 AEROSOL, METERED ORAL
Qty: 1 | Refills: 0
Start: 2021-09-16 | End: 2021-10-15

## 2021-09-16 RX ORDER — MOMETASONE FUROATE 220 UG/1
1 INHALANT RESPIRATORY (INHALATION)
Qty: 1 | Refills: 0
Start: 2021-09-16 | End: 2021-10-15

## 2021-09-16 RX ORDER — ATORVASTATIN CALCIUM 80 MG/1
1 TABLET, FILM COATED ORAL
Qty: 30 | Refills: 0
Start: 2021-09-16 | End: 2021-10-15

## 2021-09-16 RX ORDER — PANTOPRAZOLE SODIUM 20 MG/1
1 TABLET, DELAYED RELEASE ORAL
Qty: 30 | Refills: 0
Start: 2021-09-16 | End: 2021-10-15

## 2021-09-16 RX ORDER — CLOPIDOGREL BISULFATE 75 MG/1
1 TABLET, FILM COATED ORAL
Qty: 30 | Refills: 0
Start: 2021-09-16 | End: 2021-10-15

## 2021-09-16 RX ORDER — ASPIRIN/CALCIUM CARB/MAGNESIUM 324 MG
1 TABLET ORAL
Qty: 30 | Refills: 0
Start: 2021-09-16 | End: 2021-10-15

## 2021-09-16 RX ADMIN — MOMETASONE FUROATE 1 PUFF(S): 220 INHALANT RESPIRATORY (INHALATION) at 08:27

## 2021-09-16 RX ADMIN — ALBUTEROL 2 PUFF(S): 90 AEROSOL, METERED ORAL at 17:43

## 2021-09-16 RX ADMIN — ATORVASTATIN CALCIUM 80 MILLIGRAM(S): 80 TABLET, FILM COATED ORAL at 21:04

## 2021-09-16 RX ADMIN — Medication 81 MILLIGRAM(S): at 11:19

## 2021-09-16 RX ADMIN — MONTELUKAST 10 MILLIGRAM(S): 4 TABLET, CHEWABLE ORAL at 11:19

## 2021-09-16 RX ADMIN — Medication 20 MILLIGRAM(S): at 06:10

## 2021-09-16 RX ADMIN — CLOPIDOGREL BISULFATE 75 MILLIGRAM(S): 75 TABLET, FILM COATED ORAL at 11:19

## 2021-09-16 RX ADMIN — Medication 20 MILLIGRAM(S): at 11:19

## 2021-09-16 RX ADMIN — Medication 500000 UNIT(S): at 06:10

## 2021-09-16 RX ADMIN — LATANOPROST 1 DROP(S): 0.05 SOLUTION/ DROPS OPHTHALMIC; TOPICAL at 21:03

## 2021-09-16 RX ADMIN — Medication 20 MILLIGRAM(S): at 17:24

## 2021-09-16 RX ADMIN — PANTOPRAZOLE SODIUM 40 MILLIGRAM(S): 20 TABLET, DELAYED RELEASE ORAL at 11:19

## 2021-09-16 RX ADMIN — ENOXAPARIN SODIUM 40 MILLIGRAM(S): 100 INJECTION SUBCUTANEOUS at 21:04

## 2021-09-16 NOTE — PROGRESS NOTE ADULT - PROBLEM SELECTOR PLAN 1
- Acute R MCA infarct on MRI s/p tPA  - CTA neck with R ICA bifurcation with moderate narrowing  - cardio wanted to perform ARSH as cardioembolic cause could not be excluded (moderate narrowing of proximal internal carotid artery unlikely to explain R watershed infarct)  -TTE bubble study was performed which was negative for PFO  - continue with DAPT with asa / plavix and continue with statin

## 2021-09-16 NOTE — PROGRESS NOTE ADULT - SUBJECTIVE AND OBJECTIVE BOX
86y old  Male with a PMHx of HLD, BPH, History of Supraventricular Tachycardia s/p ablation & Osteoarthritis who presented to  ED on 8/28 with L arm weakness and paraesthesia. + TPA. MRI shows right MCA watershed infarct.    Patient seen and examined at bedside.  still c/o dry cough but feels it is mildly improved, throat swab negative    still feels a little gassy  no n/v, no sob      Vital Signs Last 24 Hrs  T(C): 36.5 (16 Sep 2021 07:51), Max: 36.5 (16 Sep 2021 07:51)  T(F): 97.7 (16 Sep 2021 07:51), Max: 97.7 (16 Sep 2021 07:51)  HR: 82 (16 Sep 2021 08:40) (62 - 91)  BP: 121/77 (16 Sep 2021 07:51) (121/77 - 129/70)  BP(mean): --  RR: 16 (16 Sep 2021 07:51) (15 - 16)  SpO2: 96% (16 Sep 2021 08:40) (96% - 97%)      GENERAL- NAD  EAR/NOSE/MOUTH/THROAT - no pharyngeal exudates, no oral lesions  MMM  EYES- MICHELLE, conjunctiva and Sclera clear  NECK- supple  RESPIRATORY-  clear to auscultation bilaterally, non laboured breathing  CARDIOVASCULAR - SIS2, RRR  GI - soft NT BS present  EXTREMITIES- no pedal edema  NEUROLOGY- no gross focal deficits  PSYCHIATRY- AAO X 3                  14.1                 138  | 29   | 17           7.29  >-----------< 293     ------------------------< 105                   40.6                 4.3  | 102  | 1.15                                         Ca 8.9   Mg x     Ph x            MEDICATIONS  (STANDING):  aspirin enteric coated 81 milliGRAM(s) Oral daily  atorvastatin 80 milliGRAM(s) Oral at bedtime  clopidogrel Tablet 75 milliGRAM(s) Oral daily  dicyclomine 20 milliGRAM(s) Oral three times a day before meals  enoxaparin Injectable 40 milliGRAM(s) SubCutaneous at bedtime  latanoprost 0.005% Ophthalmic Solution 1 Drop(s) Both EYES at bedtime  mometasone 220 MICROgram(s) Inhaler 1 Puff(s) Inhalation daily  montelukast 10 milliGRAM(s) Oral daily  pantoprazole    Tablet 40 milliGRAM(s) Oral daily    MEDICATIONS  (PRN):  ALBUTerol    90 MICROgram(s) HFA Inhaler 2 Puff(s) Inhalation every 6 hours PRN Shortness of Breath  aluminum hydroxide/magnesium hydroxide/simethicone Suspension 30 milliLiter(s) Oral every 4 hours PRN Dyspepsia  benzocaine 15 mG/menthol 3.6 mG (Sugar-Free) Lozenge 1 Lozenge Oral two times a day PRN Sore Throat  benzonatate 100 milliGRAM(s) Oral three times a day PRN Cough  bisacodyl Suppository 10 milliGRAM(s) Rectal at bedtime PRN Constipation  fluticasone propionate 50 MICROgram(s)/spray Nasal Spray 1 Spray(s) Both Nostrils two times a day PRN nasal congestion  guaiFENesin Oral Liquid (Sugar-Free) 100 milliGRAM(s) Oral every 6 hours PRN Cough  melatonin 6 milliGRAM(s) Oral at bedtime PRN Insomnia  phenol 1.4% (CHLORASEPTIC) Oral Spray 1 Spray(s) Topical three times a day PRN sore throat  simethicone 80 milliGRAM(s) Chew three times a day PRN Gas

## 2021-09-16 NOTE — CONSULT NOTE ADULT - SUBJECTIVE AND OBJECTIVE BOX
Wednesday 9/15/2021 11:45am    Ophthalmology Consult Note    86yMaleh/o HLD, BPH, SVT s/p ablation, s/p Rt MCA watershed CVA, bubble test neg for PFO, c/o blurry vision Lt eye  PAST MEDICAL & SURGICAL HISTORY:  GERD (Gastroesophageal Reflux Disease)    Hyperlipidemia    Benign Prostatic Hypertrophy    Diverticulosis    Osteoarthritis    Osteoporosis    History of Supraventricular Tachycardia  s/p ablation    History of Cholecystectomy    History of Prior Ablation Treatment    S/P Cataract Surgery  left eye      MEDICATIONS  (STANDING):  aspirin enteric coated 81 milliGRAM(s) Oral daily  atorvastatin 80 milliGRAM(s) Oral at bedtime  clopidogrel Tablet 75 milliGRAM(s) Oral daily  dicyclomine 20 milliGRAM(s) Oral three times a day before meals  enoxaparin Injectable 40 milliGRAM(s) SubCutaneous at bedtime  latanoprost 0.005% Ophthalmic Solution 1 Drop(s) Both EYES at bedtime  mometasone 220 MICROgram(s) Inhaler 1 Puff(s) Inhalation daily  montelukast 10 milliGRAM(s) Oral daily  pantoprazole    Tablet 40 milliGRAM(s) Oral daily    History from patient and wife    POH - s/ cataract Sx Ou            h/o glaucoma on LAT 1/1 followed by [facility] in [place]            h/o ?macular degeneration LT>RT,  ?laser Tx  Gtts - Latanoprost 1/1    Allergies - sulfa drugs (Unknown)        Vacc OD: 20/40  OS: 20/50  Pupils - 2/2 PERRLA  EOMs - Full OU  CVF - Full OU    Portable SLE bedside  LLA -  WNL OU  C/S - W&Q OU  K - Clear OU  A/C - D&Q OU  Iris - Flat OU  Lens - PC IOL OU  IOP - soft OU    DFE - Tropicamide 1%    20D indirect    Vitreous clear OU  Disc: pink and sharp c/d .35 OU  Macula: decreased reflex OU, RPE changes OS  Vessels: WNL OU   Periphery: flat OU. No tears or RD.    Imp/Plan:  1. Pseudophakia OU- observe  2. h/o Glaucoma Ou- continue Latanoprost 1/1 Ou qhs  3. h/o macular degeneration- no acute changes-observe  4. s/p Rt MCA watershed CVA- as per neuro, rehab    No acute changes- f/u with private [facility]  Exam and findings discussed with Dr Salas/.    Chris Goodwin MD, FACS  [phone]

## 2021-09-16 NOTE — PROGRESS NOTE ADULT - PROBLEM SELECTOR PLAN 2
- States he previously was on an inhaled steroid for the same issue in the past but was discontinued because of potential affects on the lungs  - Continue mometasone (Stop after 7 doses)  - Continue albuterol PRN  -  throat swab negative  - c/w cough drops prn  - gargles  - c/w singular

## 2021-09-16 NOTE — PROGRESS NOTE ADULT - ASSESSMENT
Patient is a 86 Year old male with a PMHx of HLD, BPH, History of Supraventricular Tachycardia s/p ablation & Osteoarthritis who presented to  ED on 8/28 with L arm weakness and paraesthesia. + TPA. MRI shows right MCA watershed infarct. Admitted  to acute rehabilitation with left UE monoparesis, sensory impairment, dysarthria, mild cognitive deficits, gait and ADL impairments.      #R MCA infarct  - Continue DAPT per cardio and neuro recs  - Continue atorvastatin  - Lipid panel wnl except HDL 35, HbA1C 5.6  - cont comprehensive rehab program, PT/OT/SLP 3 hours a day, 5 days a week  - Precautions: cardiac, Aspiration, falls    #Loose stools/dyspepsia  -?IBS  -cont. bentyl-- started 9/13--  -H pylori antigen- Neg  -d/w Hospitalist   -Loose stools and abdominal discomfort improved     #Cough  -- improving  --d/w hospitalist  --cont. singulair  -- cont. asmanex  --Throat Cx ordered for associated pain/irritation --pending  - Continue tessalon perle 100mg TID PRN     #L blurred vision  -hx of b/l glaucoma and macular degeneration  -Ophthalmology consult-- note reviewed, appreciate recommendations  -f/u outpatient    #HLD   - Continue 80mg Atorvastatin.     #Hiatal hernia--  -simethicone not helping  -change Protonix 40 mg daily  --ordered stool for H. Pylori--neg.  --d/w hospitalist-     #Hypertension   -BP controlled off medications  - Hospitalist is following      #PREET  -Continue to use CPAP at night.     #GI  - Discontinue stool softeners 2/2 loose stools  - Simethicone PRN dyspepsia    #  - continent  --PVR=60    #Diet  - Regular - Carb controlled - DASH/ TLC diet.     #DVT prophylaxis.   - Continue Heparin     # Sleep:  - Melatonin qHS prn    # Skin/Pressure Injury:  - Skin assessment on admission - no pressure ulcers  - Turn every 2 hours while in bed    #Discharge   - IDR - 9/9  SW - Patient lives in [place] & [place]. Commutes back & forth.   OT - e - setup supervision; g/dressing - min A;  CG--bath/toil/shower transf. Goal Mod I-Sup 3 weeks  PT - Trans - CG . Ambulate 100ft with straight cane w close supervision. 12 steps w/ 1HR,-- CG/assist -- Goal Mod I Transf, Sup Amb; CG stairs  SLP - Horse voice. Mild deficits - memory & recall. L visual field difficulties. Reduced cognition. Regular/thin liquid diet, decreased abstract reasoning, working memory, tangential.   Discharge - 9/16 Patient is a 86 Year old male with a PMHx of HLD, BPH, History of Supraventricular Tachycardia s/p ablation & Osteoarthritis who presented to  ED on 8/28 with L arm weakness and paraesthesia. + TPA. MRI shows right MCA watershed infarct. Admitted  to acute rehabilitation with left UE monoparesis, sensory impairment, dysarthria, mild cognitive deficits, gait and ADL impairments.      #R MCA infarct  - Continue DAPT per cardio and neuro recs  - Continue atorvastatin  - Lipid panel wnl except HDL 35, HbA1C 5.6  - cont comprehensive rehab program, PT/OT/SLP 3 hours a day, 5 days a week  - Precautions: cardiac, Aspiration, falls    #Loose stools/dyspepsia  -?IBS  -cont. bentyl-- started 9/13--  -H pylori antigen- Neg  -d/w Hospitalist   -Loose stools and abdominal discomfort improved     #Cough  -- improving  --d/w hospitalist  --cont. singulair  -- cont. asmanex  --Throat Cx --Neg.  - Continue tessalon perle 100mg TID PRN     #L blurred vision  -hx of b/l glaucoma and macular degeneration  -Ophthalmology consult-- note reviewed, appreciate recommendations--No acute changes- f/u with  MD  -f/u outpatient    #HLD   - Continue 80mg Atorvastatin.     #Hiatal hernia--  -cont. Protonix 40 mg daily  --ordered stool for H. Pylori--neg.  --d/w hospitalist-     #Hypertension   -BP controlled off medications  - Hospitalist is following      #PREET  -Continue to use CPAP at night.     #GI  - Discontinue stool softeners 2/2 loose stools  - Simethicone PRN dyspepsia    #  - continent  --PVR=60    #Diet  - Regular - Carb controlled - DASH/ TLC diet.     #DVT prophylaxis.   - Continue Heparin     # Sleep:  - Melatonin qHS prn    # Skin/Pressure Injury:  - Skin assessment on admission - no pressure ulcers  - Turn every 2 hours while in bed    #Discharge   - IDR - 9/16  SW - Patient lives in [place] & [place]. Commutes back & forth.   OT - e / g / UBD/ toil transf - setup supervision; LBD - min A;  CG--bath--Standing, and shower transf,  Sup--seated bathing. Goal Mod I-Sup 3 weeks  PT - Trans -Sup.  Ambulate 150ft without AD w/ supervision. 12 steps w/ 1HR,-- CG -- Goal Mod I Transf, Sup Amb; CG stairs  SLP - Reg/thin; Horse voice. Mod I Comp/PS; Decreased working memory, impaired money management, decreased insight,  Mild deficits - memory,  L visual field difficulties. decreased abstract reasoning,  tangential.   Discharge - 9/17

## 2021-09-16 NOTE — PROGRESS NOTE ADULT - SUBJECTIVE AND OBJECTIVE BOX
Patient is a 86y old  Male who presents with a chief complaint of R MCA CVA (16 Sep 2021 12:53)      HPI:  Patient is a 86 Year old male with a PMHx of HLD, BPH, History of Supraventricular Tachycardia s/p ablation & Osteoarthritis, PREET on home cpap who presented to  ED on  who woke up from a nap and had L arm weakness and paraesthesia In the ED he had a negative Head CT and was given TPA. MRI of the head was significant for small uncomplicated right MCA watershed infarct. CT angio of brain showed proximal internal carotid artery with at least moderate narrowing but no occlusion. Post TPA Patient continued to have LUE > LLE weakness.   Patient was seen by cardiology who were unable to preform TTE due to probe not being passed successfully. TTE bubble study was negative for any PFO. Also recommended Ecotrin to 81 mg po daily, Plavix 75 mg po daily & Statin. Pt recommended for acute inpatient rehabilitation and was transferred to [facility] on 2021.            (31 Aug 2021 15:52)        SUBJECTIVE: Patient seen and examined. No acute overnight events, slept well. Notes intermittent gas pain, however pain/discomfort is significantly improved since starting bentyl. Patient feels well this AM, denies any complaints.        REVIEW OF SYMPTOMS  Neurological deficits-- (+) left sided weakness. (+) dry cough, dyspepsia  Denies any dizziness, CP, n/v, abdominal pain, diarrhea        VITALS  86y  Vital Signs Last 24 Hrs  T(C): 36.5 (16 Sep 2021 07:51), Max: 36.5 (16 Sep 2021 07:51)  T(F): 97.7 (16 Sep 2021 07:51), Max: 97.7 (16 Sep 2021 07:51)  HR: 82 (16 Sep 2021 08:40) (62 - 91)  BP: 121/77 (16 Sep 2021 07:51) (121/77 - 129/70)  BP(mean): --  RR: 16 (16 Sep 2021 07:51) (15 - 16)  SpO2: 96% (16 Sep 2021 08:40) (96% - 97%)  Daily     Daily Weight in k (15 Sep 2021 23:09)      Physical Exam:   Constitutional - NAD, Comfortable, laying comfortably in bed  HEENT - NCAT, EOMI  Chest -breathing comfortably on RA. CTA b/l, no wheezing, rales, rhonchi  Cardio - warm and well perfused, RRR, + external cardiac monitor  Abdomen -  Soft, NTND. Normoactive bowel sounds. No epigastric tenderness  Extremities - No peripheral edema, No calf tenderness   Neurologic Exam: AAOx3, left UE 4/5, Coordination FNF on left impaired  Psychiatric - Mood stable, Affect WNL        FUNCTIONAL STATUS:        RECENT LABS:                        14.1   7.29  )-----------( 293      ( 16 Sep 2021 07:00 )             40.6         138  |  102  |  17  ----------------------------<  105<H>  4.3   |  29  |  1.15    Ca    8.9      16 Sep 2021 07:00    TPro  6.9  /  Alb  3.5  /  TBili  0.5  /  DBili  x   /  AST  31  /  ALT  66<H>  /  AlkPhos  81      LIVER FUNCTIONS - ( 16 Sep 2021 07:00 )  Alb: 3.5 g/dL / Pro: 6.9 g/dL / ALK PHOS: 81 U/L / ALT: 66 U/L / AST: 31 U/L / GGT: x                 Culture - Group A Streptococcus (collected 21 @ 14:30)  Source: .Throat Throat  Final Report (21 @ 14:42):    No Streptococcus pyogenes (Group A) isolated        CAPILLARY BLOOD GLUCOSE            MEDICATIONS:  MEDICATIONS  (STANDING):  aspirin enteric coated 81 milliGRAM(s) Oral daily  atorvastatin 80 milliGRAM(s) Oral at bedtime  clopidogrel Tablet 75 milliGRAM(s) Oral daily  dicyclomine 20 milliGRAM(s) Oral three times a day before meals  enoxaparin Injectable 40 milliGRAM(s) SubCutaneous at bedtime  latanoprost 0.005% Ophthalmic Solution 1 Drop(s) Both EYES at bedtime  mometasone 220 MICROgram(s) Inhaler 1 Puff(s) Inhalation daily  montelukast 10 milliGRAM(s) Oral daily  pantoprazole    Tablet 40 milliGRAM(s) Oral daily    MEDICATIONS  (PRN):  ALBUTerol    90 MICROgram(s) HFA Inhaler 2 Puff(s) Inhalation every 6 hours PRN Shortness of Breath  aluminum hydroxide/magnesium hydroxide/simethicone Suspension 30 milliLiter(s) Oral every 4 hours PRN Dyspepsia  benzocaine 15 mG/menthol 3.6 mG (Sugar-Free) Lozenge 1 Lozenge Oral two times a day PRN Sore Throat  benzonatate 100 milliGRAM(s) Oral three times a day PRN Cough  bisacodyl Suppository 10 milliGRAM(s) Rectal at bedtime PRN Constipation  fluticasone propionate 50 MICROgram(s)/spray Nasal Spray 1 Spray(s) Both Nostrils two times a day PRN nasal congestion  guaiFENesin Oral Liquid (Sugar-Free) 100 milliGRAM(s) Oral every 6 hours PRN Cough  melatonin 6 milliGRAM(s) Oral at bedtime PRN Insomnia  phenol 1.4% (CHLORASEPTIC) Oral Spray 1 Spray(s) Topical three times a day PRN sore throat  simethicone 80 milliGRAM(s) Chew three times a day PRN Gas

## 2021-09-17 VITALS — OXYGEN SATURATION: 97 %

## 2021-09-17 PROCEDURE — 99239 HOSP IP/OBS DSCHRG MGMT >30: CPT

## 2021-09-17 PROCEDURE — 99233 SBSQ HOSP IP/OBS HIGH 50: CPT

## 2021-09-17 RX ORDER — SODIUM CHLORIDE 0.65 %
1 AEROSOL, SPRAY (ML) NASAL
Qty: 1 | Refills: 0
Start: 2021-09-17 | End: 2021-10-16

## 2021-09-17 RX ADMIN — PANTOPRAZOLE SODIUM 40 MILLIGRAM(S): 20 TABLET, DELAYED RELEASE ORAL at 11:01

## 2021-09-17 RX ADMIN — CLOPIDOGREL BISULFATE 75 MILLIGRAM(S): 75 TABLET, FILM COATED ORAL at 11:23

## 2021-09-17 RX ADMIN — Medication 20 MILLIGRAM(S): at 06:03

## 2021-09-17 RX ADMIN — MOMETASONE FUROATE 1 PUFF(S): 220 INHALANT RESPIRATORY (INHALATION) at 08:16

## 2021-09-17 RX ADMIN — ALBUTEROL 2 PUFF(S): 90 AEROSOL, METERED ORAL at 08:18

## 2021-09-17 RX ADMIN — Medication 81 MILLIGRAM(S): at 11:23

## 2021-09-17 RX ADMIN — Medication 20 MILLIGRAM(S): at 10:59

## 2021-09-17 RX ADMIN — MONTELUKAST 10 MILLIGRAM(S): 4 TABLET, CHEWABLE ORAL at 11:23

## 2021-09-17 NOTE — PROGRESS NOTE ADULT - PROBLEM SELECTOR PLAN 11
- Continue lovenox    d/w dr. doty - Continue Lovenox    medically stable for d/c home    d/w dr. doty

## 2021-09-17 NOTE — PROGRESS NOTE ADULT - ATTENDING COMMENTS
Pt. seen with resident.  Agree with documentation above as per resident with amendments made as appropriate. Patient medically stable. Making progress towards rehab goals.     Right MCA infarct  --Pt. had multiple episodes of diarrhea/loose stools.  GI dyspepsia symptoms not improving.  H.pylori test pending,  d/w Hospitalist-- started pt. on trial of Bentyl  --Cough-- improved with Albuterol Inhaler-- pt. states has been taking once daily-- rec. to increase to BID
Pt. seen with resident.  Agree with documentation above as per resident with amendments made as appropriate. Patient medically stable. Making progress towards rehab goals.     Right MCA infarct  --monitor BP-- hospitalist following  -- met with pt. and his fiance at bedside for update.  She will be able to provide supervision after discharge.
Pt. seen with resident.  Agree with documentation above as per resident with amendments made as appropriate. Patient medically stable. Making progress towards rehab goals.     right MCA infarct  Stable  d/c planning to home tomorrow
Pt. seen with resident.  Agree with documentation above as per resident with amendments made as appropriate. Patient medically stable. Making progress towards rehab goals.     right MCA infarct  c/o cough--lungs CTA, no leukocytosis or fever.  URI-- robitussin PRN  Dyspepsia-- simethicone PRN
Pt. seen with resident.  Agree with documentation above as per resident with amendments made as appropriate. Patient medically stable. Making progress towards rehab goals.     Right MCA infarct  dyspepsia and diarrhea improved on bentyl  Cough persists, irritation in throat--d/w hospitalist-- throat cx ordered, added singular.  Had family training today-- d/c plan for 9/17
Pt. seen with resident.  Agree with documentation above as per resident with amendments made as appropriate. Patient medically stable. Making progress towards rehab goals.     Right MCA Infarct   left UE strength improving.  No cough today, lungs CTA.     ** Spoke with pt's  _daughter, Jordyn Hanna___, to provide update on pt's status,  medical update, address her concerns, Discussed suspected pathology of stroke, general prognosis, and addressed questions regarding pt's neurological deficits of left visual field deficit, and left UE weakness.    All questions answered.
Pt. seen with resident.  Agree with documentation above as per resident with amendments made as appropriate. Patient medically stable. Making progress towards rehab goals.     right MCA infarct s/p TPA  stable.
Pt. seen with resident.  Agree with documentation above as per resident with amendments made as appropriate. Patient medically stable. Making progress towards rehab goals.     right MCA infarct  Patient medically stable for discharge to home today.  Discharge medications and instructions reviewed with patient and pt's daughter.  All questions answered.   Plan of care and medication for GI and respiratory symptoms d/w hospitalist

## 2021-09-17 NOTE — PROGRESS NOTE ADULT - PROBLEM SELECTOR PROBLEM 2
Leukocytosis
Leukocytosis
Dry cough
HLD (hyperlipidemia)
Leukocytosis
Dry cough
Loose stools
HLD (hyperlipidemia)
Dry cough
Dry cough

## 2021-09-17 NOTE — PROGRESS NOTE ADULT - PROBLEM SELECTOR PLAN 4
- monitor BP off medications
- Continue CPAP from home
- continue atorvastatin
- monitor BP off medications.
- continue atorvastatin
- continue atorvastatin
- monitor BP off medications
- Continue CPAP from home
- monitor BP off medications.
- continue atorvastatin

## 2021-09-17 NOTE — PROGRESS NOTE ADULT - PROBLEM SELECTOR PLAN 10
- Resolved  - noted to be elevated however patient afebrile and nontoxic appearing - Resolved  - patient well appearing afebrile and nontoxic

## 2021-09-17 NOTE — PROGRESS NOTE ADULT - PROBLEM SELECTOR PLAN 8
- mildly elevated  - continue to monitor - mildly elevated, downtrending  - f/u with PMD in 1-2 weeks for recheck labs  - continue to monitor

## 2021-09-17 NOTE — PROGRESS NOTE ADULT - PROVIDER SPECIALTY LIST ADULT
Rehab Medicine
Rehab Medicine
Physiatry
Rehab Medicine
Hospitalist
Physiatry
Hospitalist

## 2021-09-17 NOTE — PROGRESS NOTE ADULT - TIME BILLING
** Spoke with pt's  _daughter, Jordyn Hanna_, to provide update on pt's status,  medical update, discussed update daughter had from pt's [facility] at [facility], rehab plan of care, and medication changes.  All questions answered.
** Spoke with pt's  _daughter, Jordyn Hanna_, to provide update on pt's status,  medical update, medications, progress in therapy,  discharge instructions and medications.  Pt. will have supervision at home from family.  All questions answered.
** Spoke with pt's Daughter, Jordyn Hanna__, to provide update on pt's status,  medical update, progress in therapy, general prognosis and discussed that pt's discharge can be changed to earlier sometime between 9/15 to 9/17.  Discussed family training prior.  Jordyn Hanna would like to discuss with pt's girlfriend to figure a plan of care after discharge.  Pt. will have supervision/ assistance at home from family.  All questions answered.
Discharge medications and instructions reviewed with patient and pt's daughter.  All questions answered.   Plan of care and medication for GI and respiratory symptoms d/w hospitalist
** Spoke with pt's  _daughter, Jordyn Hanna___, to provide update on pt's status,  medical update, address her concerns, Discussed suspected pathology of stroke, general prognosis, and addressed questions regarding pt's neurological deficits of left visual field deficit, and left UE weakness.    All questions answered.

## 2021-09-17 NOTE — PROGRESS NOTE ADULT - PROBLEM SELECTOR PROBLEM 3
Loose stools
HLD (hyperlipidemia)
HTN (hypertension)
HLD (hyperlipidemia)
HTN (hypertension)
Loose stools

## 2021-09-17 NOTE — PROGRESS NOTE ADULT - PROBLEM/PLAN-8
Conjuntivae and eyelids appear normal, Sclerae : White without injection
DISPLAY PLAN FREE TEXT

## 2021-09-17 NOTE — PROGRESS NOTE ADULT - PROBLEM SELECTOR PROBLEM 4
HTN (hypertension)
HTN (hypertension)
HLD (hyperlipidemia)
HTN (hypertension)
PREET on CPAP
HLD (hyperlipidemia)
HLD (hyperlipidemia)
HTN (hypertension)
PREET on CPAP
HLD (hyperlipidemia)

## 2021-09-17 NOTE — PROGRESS NOTE ADULT - PROBLEM SELECTOR PLAN 9
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

## 2021-09-17 NOTE — PROGRESS NOTE ADULT - SUBJECTIVE AND OBJECTIVE BOX
86y old  Male with a PMHx of HLD, BPH, History of Supraventricular Tachycardia s/p ablation & Osteoarthritis who presented to  ED on 8/28 with L arm weakness and paraesthesia. + TPA. MRI shows right MCA watershed infarct.    Patient seen and examined at bedside.  still c/o dry cough but feels it is mildly improved, throat swab negative   still feels a little gassy  no n/v, no sob    Vital Signs Last 24 Hrs  T(C): 36.4 (17 Sep 2021 07:42), Max: 36.6 (16 Sep 2021 20:33)  T(F): 97.5 (17 Sep 2021 07:42), Max: 97.9 (16 Sep 2021 20:33)  HR: 77 (17 Sep 2021 08:16) (68 - 82)  BP: 124/70 (17 Sep 2021 07:42) (112/72 - 124/70)  BP(mean): --  RR: 16 (17 Sep 2021 07:42) (15 - 16)  SpO2: 97% (17 Sep 2021 08:16) (96% - 98%)      GENERAL- NAD  EAR/NOSE/MOUTH/THROAT - no pharyngeal exudates, no oral lesions  MMM  EYES- MICHELLE, conjunctiva and Sclera clear  NECK- supple  RESPIRATORY-  clear to auscultation bilaterally, non laboured breathing  CARDIOVASCULAR - SIS2, RRR  GI - soft NT BS present  EXTREMITIES- no pedal edema  NEUROLOGY- no gross focal deficits  PSYCHIATRY- AAO X 3                  14.1                 138  | 29   | 17           7.29  >-----------< 293     ------------------------< 105                   40.6                 4.3  | 102  | 1.15                                         Ca 8.9   Mg x     Ph x            MEDICATIONS  (STANDING):  aspirin enteric coated 81 milliGRAM(s) Oral daily  atorvastatin 80 milliGRAM(s) Oral at bedtime  clopidogrel Tablet 75 milliGRAM(s) Oral daily  dicyclomine 20 milliGRAM(s) Oral three times a day before meals  enoxaparin Injectable 40 milliGRAM(s) SubCutaneous at bedtime  latanoprost 0.005% Ophthalmic Solution 1 Drop(s) Both EYES at bedtime  mometasone 220 MICROgram(s) Inhaler 1 Puff(s) Inhalation daily  montelukast 10 milliGRAM(s) Oral daily  pantoprazole    Tablet 40 milliGRAM(s) Oral daily    MEDICATIONS  (PRN):  ALBUTerol    90 MICROgram(s) HFA Inhaler 2 Puff(s) Inhalation every 6 hours PRN Shortness of Breath  aluminum hydroxide/magnesium hydroxide/simethicone Suspension 30 milliLiter(s) Oral every 4 hours PRN Dyspepsia  benzocaine 15 mG/menthol 3.6 mG (Sugar-Free) Lozenge 1 Lozenge Oral two times a day PRN Sore Throat  benzonatate 100 milliGRAM(s) Oral three times a day PRN Cough  bisacodyl Suppository 10 milliGRAM(s) Rectal at bedtime PRN Constipation  fluticasone propionate 50 MICROgram(s)/spray Nasal Spray 1 Spray(s) Both Nostrils two times a day PRN nasal congestion  guaiFENesin Oral Liquid (Sugar-Free) 100 milliGRAM(s) Oral every 6 hours PRN Cough  melatonin 6 milliGRAM(s) Oral at bedtime PRN Insomnia  phenol 1.4% (CHLORASEPTIC) Oral Spray 1 Spray(s) Topical three times a day PRN sore throat  simethicone 80 milliGRAM(s) Chew three times a day PRN Gas   86y old  Male with a PMHx of HLD, BPH, History of Supraventricular Tachycardia s/p ablation & Osteoarthritis who presented to  ED on 8/28 with L arm weakness and paraesthesia. + TPA. MRI shows right MCA watershed infarct.    Patient seen and examined at bedside.  still c/o dry cough, likely due to dryness, no wheezing,  throat swab negative   c/o1 soft and later 1 loose stools this am, no abdominal pain  no n/v, no sob    Vital Signs Last 24 Hrs  T(C): 36.4 (17 Sep 2021 07:42), Max: 36.6 (16 Sep 2021 20:33)  T(F): 97.5 (17 Sep 2021 07:42), Max: 97.9 (16 Sep 2021 20:33)  HR: 77 (17 Sep 2021 08:16) (68 - 82)  BP: 124/70 (17 Sep 2021 07:42) (112/72 - 124/70)  BP(mean): --  RR: 16 (17 Sep 2021 07:42) (15 - 16)  SpO2: 97% (17 Sep 2021 08:16) (96% - 98%)      GENERAL- NAD  EAR/NOSE/MOUTH/THROAT - no pharyngeal exudates, no oral lesions  MMM  EYES- MICHELLE, conjunctiva and Sclera clear  NECK- supple  RESPIRATORY-  clear to auscultation bilaterally, non laboured breathing  CARDIOVASCULAR - SIS2, RRR  GI - soft NT BS present  EXTREMITIES- no pedal edema  NEUROLOGY- no gross focal deficits  PSYCHIATRY- AAO X 3                    14.1                 138  | 29   | 17           7.29  >-----------< 293     ------------------------< 105                   40.6                 4.3  | 102  | 1.15                                         Ca 8.9   Mg x     Ph x            MEDICATIONS  (STANDING):  aspirin enteric coated 81 milliGRAM(s) Oral daily  atorvastatin 80 milliGRAM(s) Oral at bedtime  clopidogrel Tablet 75 milliGRAM(s) Oral daily  dicyclomine 20 milliGRAM(s) Oral three times a day before meals  enoxaparin Injectable 40 milliGRAM(s) SubCutaneous at bedtime  latanoprost 0.005% Ophthalmic Solution 1 Drop(s) Both EYES at bedtime  montelukast 10 milliGRAM(s) Oral daily  pantoprazole    Tablet 40 milliGRAM(s) Oral daily    MEDICATIONS  (PRN):  ALBUTerol    90 MICROgram(s) HFA Inhaler 2 Puff(s) Inhalation every 6 hours PRN Shortness of Breath  aluminum hydroxide/magnesium hydroxide/simethicone Suspension 30 milliLiter(s) Oral every 4 hours PRN Dyspepsia  benzocaine 15 mG/menthol 3.6 mG (Sugar-Free) Lozenge 1 Lozenge Oral two times a day PRN Sore Throat  benzonatate 100 milliGRAM(s) Oral three times a day PRN Cough  bisacodyl Suppository 10 milliGRAM(s) Rectal at bedtime PRN Constipation  fluticasone propionate 50 MICROgram(s)/spray Nasal Spray 1 Spray(s) Both Nostrils two times a day PRN nasal congestion  guaiFENesin Oral Liquid (Sugar-Free) 100 milliGRAM(s) Oral every 6 hours PRN Cough  melatonin 6 milliGRAM(s) Oral at bedtime PRN Insomnia  phenol 1.4% (CHLORASEPTIC) Oral Spray 1 Spray(s) Topical three times a day PRN sore throat  simethicone 80 milliGRAM(s) Chew three times a day PRN Gas

## 2021-09-17 NOTE — PROGRESS NOTE ADULT - PROBLEM SELECTOR PLAN 7
- history of SVT s/p ablation  - continue to monitor - history of SVT s/p ablation  - no chest pain, no palpitations, HR stable  - continue to monitor

## 2021-09-17 NOTE — PROGRESS NOTE ADULT - PROBLEM SELECTOR PLAN 2
- States he previously was on an inhaled steroid for the same issue in the past but was discontinued because of potential affects on the lungs  - Continue mometasone (Stop after 7 doses)  - Continue albuterol PRN  -  throat swab negative  - c/w cough drops prn  - gargles  - c/w singular - no h/o asthma or copd, on no inhalers at home, cough likely due to dryness  - nasal saline, humidifier, gargles and steam inhalation  -  throat swab negative  - c/w cough drops prn  - c/w singular  - d/w patient and dtr at the bedside

## 2021-09-17 NOTE — PROGRESS NOTE ADULT - PROBLEM SELECTOR PROBLEM 5
HTN (hypertension)
PREET on CPAP
PREET on CPAP
SVT (supraventricular tachycardia)
SVT (supraventricular tachycardia)
HTN (hypertension)
PREET on CPAP
PREET on CPAP
HTN (hypertension)
HTN (hypertension)

## 2021-09-17 NOTE — PROGRESS NOTE ADULT - ASSESSMENT
Patient is a 86 Year old male with a PMHx of HLD, BPH, History of Supraventricular Tachycardia s/p ablation & Osteoarthritis who presented to  ED on 8/28 with L arm weakness and paraesthesia. + TPA. MRI shows right MCA watershed infarct. Admitted  to acute rehabilitation with left UE monoparesis, sensory impairment, dysarthria, mild cognitive deficits, gait and ADL impairments.      #R MCA infarct  - Continue DAPT per cardio and neuro recs on DC  - Continue atorvastatin on DC  - Lipid panel wnl except HDL 35, HbA1C 5.6  - cont home PT/OT therapy    #Loose stools/dyspepsia  -?IBS  -cont. bentyl-- started 9/13--  -H pylori antigen- Neg  -d/w Hospitalist   -Loose stools and abdominal discomfort improved   -Continue bentyl on DC    #Cough  -- improving  --d/w hospitalist  --cont. singulair  -- DC asmanex and tessalon perle, d/w hospitalist  --Throat Cx --Neg.    #L blurred vision  -hx of b/l glaucoma and macular degeneration  -Ophthalmology consult-- note reviewed, appreciate recommendations--No acute changes- f/u with  MD  -f/u outpatient    #HLD   - Continue 80mg Atorvastatin.     #Hiatal hernia--  -cont. Protonix 40 mg daily  --ordered stool for H. Pylori--neg.  --d/w hospitalist-     #Hypertension   -BP controlled off medications  - Hospitalist is following      #PREET  -Continue to use CPAP at night.     #GI  - Discontinue stool softeners 2/2 loose stools  - Simethicone PRN dyspepsia    #  - continent  --PVR=60    #Diet  - Regular - Carb controlled - DASH/ TLC diet.     #DVT prophylaxis.   - Continue Heparin     # Sleep:  - Melatonin qHS prn    # Skin/Pressure Injury:  - Skin assessment on admission - no pressure ulcers  - Turn every 2 hours while in bed    #Discharge   - IDR - 9/16  SW - Patient lives in [place] & [place]. Commutes back & forth.   OT - e / g / UBD/ toil transf - setup supervision; LBD - min A;  CG--bath--Standing, and shower transf,  Sup--seated bathing. Goal Mod I-Sup 3 weeks  PT - Trans -Sup.  Ambulate 150ft without AD w/ supervision. 12 steps w/ 1HR,-- CG -- Goal Mod I Transf, Sup Amb; CG stairs  SLP - Reg/thin; Horse voice. Mod I Comp/PS; Decreased working memory, impaired money management, decreased insight,  Mild deficits - memory,  L visual field difficulties. decreased abstract reasoning,  tangential.   Discharge - 9/17 Patient is a 86 Year old male with a PMHx of HLD, BPH, History of Supraventricular Tachycardia s/p ablation & Osteoarthritis who presented to  ED on 8/28 with L arm weakness and paraesthesia. + TPA. MRI shows right MCA watershed infarct. Admitted  to acute rehabilitation with left UE monoparesis, sensory impairment, dysarthria, mild cognitive deficits, gait and ADL impairments.      #R MCA infarct  - Continue DAPT per cardio and neuro recs on DC  - Continue atorvastatin on DC  - Lipid panel wnl except HDL 35, HbA1C 5.6  - cont home PT/OT and speech therapy    #Loose stools/dyspepsia  -?IBS  -cont. bentyl-- started 9/13--  -H pylori antigen- Neg  -d/w Hospitalist   -Loose stools and abdominal discomfort improved   -Continue bentyl on DC    #Cough  -- improving  --d/w hospitalist  --cont. singulair  -- DC asmanex and tessalon perle, d/w hospitalist  --Throat Cx --Neg.    #L blurred vision  -hx of b/l glaucoma and macular degeneration  -Ophthalmology consult-- note reviewed, appreciate recommendations--No acute changes- f/u with  MD  -f/u outpatient    #HLD   - Continue 80mg Atorvastatin.     #Hiatal hernia--  -cont. Protonix 40 mg daily  --ordered stool for H. Pylori--neg.  --d/w hospitalist-     #Hypertension   -BP controlled off medications  - Hospitalist is following      #PREET  -Continue to use CPAP at night.     #GI  - Discontinue stool softeners 2/2 loose stools  - Simethicone PRN dyspepsia    #  - continent  --PVR=60    #Diet  - Regular - Carb controlled - DASH/ TLC diet.           #Discharge   - IDR - 9/16  SW - Patient lives in [place] & [place]. Commutes back & forth.   OT - e / g / UBD/ toil transf - setup supervision; LBD - min A;  CG--bath--Standing, and shower transf,  Sup--seated bathing. Goal Mod I-Sup 3 weeks  PT - Trans -Sup.  Ambulate 150ft without AD w/ supervision. 12 steps w/ 1HR,-- CG -- Goal Mod I Transf, Sup Amb; CG stairs  SLP - Reg/thin; Horse voice. Mod I Comp/PS; Decreased working memory, impaired money management, decreased insight,  Mild deficits - memory,  L visual field difficulties. decreased abstract reasoning,  tangential.   Discharge - 9/17

## 2021-09-17 NOTE — PROGRESS NOTE ADULT - SUBJECTIVE AND OBJECTIVE BOX
Patient is a 86y old  Male who presents with a chief complaint of R MCA CVA (16 Sep 2021 14:52)      HPI:  Patient is a 86 Year old male with a PMHx of HLD, BPH, History of Supraventricular Tachycardia s/p ablation & Osteoarthritis, PREET on home cpap who presented to  ED on  who woke up from a nap and had L arm weakness and paraesthesia In the ED he had a negative Head CT and was given TPA. MRI of the head was significant for small uncomplicated right MCA watershed infarct. CT angio of brain showed proximal internal carotid artery with at least moderate narrowing but no occlusion. Post TPA Patient continued to have LUE > LLE weakness.   Patient was seen by cardiology who were unable to preform TTE due to probe not being passed successfully. TTE bubble study was negative for any PFO. Also recommended Ecotrin to 81 mg po daily, Plavix 75 mg po daily & Statin. Pt recommended for acute inpatient rehabilitation and was transferred to [facility] on 2021.            (31 Aug 2021 15:52)        SUBJECTIVE: Patient seen and examined. No acute overnight events, slept well. Looking forward to his DC today. Patient feels well this AM. Notes improvement in his dyspepsia. Denies any complaints.      REVIEW OF SYMPTOMS  Neurological deficits-- (+) left sided weakness. (+) dry cough  Denies any dizziness, CP, n/v, abdominal pain, diarrhea      VITALS  86y  Vital Signs Last 24 Hrs  T(C): 36.4 (17 Sep 2021 07:42), Max: 36.6 (16 Sep 2021 20:33)  T(F): 97.5 (17 Sep 2021 07:42), Max: 97.9 (16 Sep 2021 20:33)  HR: 77 (17 Sep 2021 08:16) (68 - 82)  BP: 124/70 (17 Sep 2021 07:42) (112/72 - 124/70)  BP(mean): --  RR: 16 (17 Sep 2021 07:42) (15 - 16)  SpO2: 97% (17 Sep 2021 08:16) (96% - 98%)  Daily     Daily Weight in k.2 (16 Sep 2021 22:59)        Physical Exam:   Constitutional - NAD, Comfortable, laying comfortably in bed  HEENT - NCAT, EOMI  Chest -breathing comfortably on RA. CTA b/l, no wheezing, rales, rhonchi  Cardio - warm and well perfused, RRR, + external cardiac monitor  Abdomen -  Soft, NTND. Normoactive bowel sounds. No epigastric tenderness  Extremities - No peripheral edema, No calf tenderness   Neurologic Exam: AAOx3, left UE 4/5, Coordination FNF on left impaired  Psychiatric - Mood stable, Affect WNL        FUNCTIONAL STATUS:        RECENT LABS:                        14.1   7.29  )-----------( 293      ( 16 Sep 2021 07:00 )             40.6         138  |  102  |  17  ----------------------------<  105<H>  4.3   |  29  |  1.15    Ca    8.9      16 Sep 2021 07:00    TPro  6.9  /  Alb  3.5  /  TBili  0.5  /  DBili  x   /  AST  31  /  ALT  66<H>  /  AlkPhos  81      LIVER FUNCTIONS - ( 16 Sep 2021 07:00 )  Alb: 3.5 g/dL / Pro: 6.9 g/dL / ALK PHOS: 81 U/L / ALT: 66 U/L / AST: 31 U/L / GGT: x                 Culture - Group A Streptococcus (collected 21 @ 14:30)  Source: .Throat Throat  Final Report (21 @ 14:42):    No Streptococcus pyogenes (Group A) isolated        CAPILLARY BLOOD GLUCOSE            MEDICATIONS:  MEDICATIONS  (STANDING):  aspirin enteric coated 81 milliGRAM(s) Oral daily  atorvastatin 80 milliGRAM(s) Oral at bedtime  clopidogrel Tablet 75 milliGRAM(s) Oral daily  dicyclomine 20 milliGRAM(s) Oral three times a day before meals  enoxaparin Injectable 40 milliGRAM(s) SubCutaneous at bedtime  latanoprost 0.005% Ophthalmic Solution 1 Drop(s) Both EYES at bedtime  montelukast 10 milliGRAM(s) Oral daily  pantoprazole    Tablet 40 milliGRAM(s) Oral daily    MEDICATIONS  (PRN):  ALBUTerol    90 MICROgram(s) HFA Inhaler 2 Puff(s) Inhalation every 6 hours PRN Shortness of Breath  aluminum hydroxide/magnesium hydroxide/simethicone Suspension 30 milliLiter(s) Oral every 4 hours PRN Dyspepsia  benzocaine 15 mG/menthol 3.6 mG (Sugar-Free) Lozenge 1 Lozenge Oral two times a day PRN Sore Throat  benzonatate 100 milliGRAM(s) Oral three times a day PRN Cough  bisacodyl Suppository 10 milliGRAM(s) Rectal at bedtime PRN Constipation  fluticasone propionate 50 MICROgram(s)/spray Nasal Spray 1 Spray(s) Both Nostrils two times a day PRN nasal congestion  guaiFENesin Oral Liquid (Sugar-Free) 100 milliGRAM(s) Oral every 6 hours PRN Cough  melatonin 6 milliGRAM(s) Oral at bedtime PRN Insomnia  phenol 1.4% (CHLORASEPTIC) Oral Spray 1 Spray(s) Topical three times a day PRN sore throat  simethicone 80 milliGRAM(s) Chew three times a day PRN Gas     Patient is a 86y old  Male who presents with a chief complaint of R MCA CVA (16 Sep 2021 14:52)      HPI:  Patient is a 86 Year old male with a PMHx of HLD, BPH, History of Supraventricular Tachycardia s/p ablation & Osteoarthritis, PREET on home cpap who presented to  ED on  who woke up from a nap and had L arm weakness and paraesthesia In the ED he had a negative Head CT and was given TPA. MRI of the head was significant for small uncomplicated right MCA watershed infarct. CT angio of brain showed proximal internal carotid artery with at least moderate narrowing but no occlusion. Post TPA Patient continued to have LUE > LLE weakness.   Patient was seen by cardiology who were unable to preform TTE due to probe not being passed successfully. TTE bubble study was negative for any PFO. Also recommended Ecotrin to 81 mg po daily, Plavix 75 mg po daily & Statin. Pt recommended for acute inpatient rehabilitation and was transferred to [facility] on 2021.               SUBJECTIVE: Patient seen and examined. No acute overnight events, slept well. Looking forward to his DC today. Patient feels well this AM. Notes improvement in his dyspepsia. Denies any complaints.      REVIEW OF SYMPTOMS  Neurological deficits-- (+) left sided weakness. (+) dry cough  Denies any dizziness, CP, n/v, abdominal pain, diarrhea      VITALS  86y  Vital Signs Last 24 Hrs  T(C): 36.4 (17 Sep 2021 07:42), Max: 36.6 (16 Sep 2021 20:33)  T(F): 97.5 (17 Sep 2021 07:42), Max: 97.9 (16 Sep 2021 20:33)  HR: 77 (17 Sep 2021 08:16) (68 - 82)  BP: 124/70 (17 Sep 2021 07:42) (112/72 - 124/70)  BP(mean): --  RR: 16 (17 Sep 2021 07:42) (15 - 16)  SpO2: 97% (17 Sep 2021 08:16) (96% - 98%)  Daily     Daily Weight in k.2 (16 Sep 2021 22:59)        Physical Exam:   Constitutional - NAD, Comfortable, laying comfortably in bed  HEENT - NCAT, EOMI  Chest -breathing comfortably on RA. CTA b/l, no wheezing, rales, rhonchi  Cardio - warm and well perfused, RRR, + external cardiac monitor  Abdomen -  Soft, NTND. Normoactive bowel sounds. No epigastric tenderness  Extremities - No peripheral edema, No calf tenderness   Neurologic Exam: AAOx3, left UE 4/5, Coordination FNF on left impaired  Psychiatric - Mood stable, Affect WNL        FUNCTIONAL STATUS:        RECENT LABS:                        14.1   7.29  )-----------( 293      ( 16 Sep 2021 07:00 )             40.6         138  |  102  |  17  ----------------------------<  105<H>  4.3   |  29  |  1.15    Ca    8.9      16 Sep 2021 07:00    TPro  6.9  /  Alb  3.5  /  TBili  0.5  /  DBili  x   /  AST  31  /  ALT  66<H>  /  AlkPhos  81      LIVER FUNCTIONS - ( 16 Sep 2021 07:00 )  Alb: 3.5 g/dL / Pro: 6.9 g/dL / ALK PHOS: 81 U/L / ALT: 66 U/L / AST: 31 U/L / GGT: x                 Culture - Group A Streptococcus (collected 21 @ 14:30)  Source: .Throat Throat  Final Report (21 @ 14:42):    No Streptococcus pyogenes (Group A) isolated        CAPILLARY BLOOD GLUCOSE            MEDICATIONS:  MEDICATIONS  (STANDING):  aspirin enteric coated 81 milliGRAM(s) Oral daily  atorvastatin 80 milliGRAM(s) Oral at bedtime  clopidogrel Tablet 75 milliGRAM(s) Oral daily  dicyclomine 20 milliGRAM(s) Oral three times a day before meals  enoxaparin Injectable 40 milliGRAM(s) SubCutaneous at bedtime  latanoprost 0.005% Ophthalmic Solution 1 Drop(s) Both EYES at bedtime  montelukast 10 milliGRAM(s) Oral daily  pantoprazole    Tablet 40 milliGRAM(s) Oral daily    MEDICATIONS  (PRN):  ALBUTerol    90 MICROgram(s) HFA Inhaler 2 Puff(s) Inhalation every 6 hours PRN Shortness of Breath  aluminum hydroxide/magnesium hydroxide/simethicone Suspension 30 milliLiter(s) Oral every 4 hours PRN Dyspepsia  benzocaine 15 mG/menthol 3.6 mG (Sugar-Free) Lozenge 1 Lozenge Oral two times a day PRN Sore Throat  benzonatate 100 milliGRAM(s) Oral three times a day PRN Cough  bisacodyl Suppository 10 milliGRAM(s) Rectal at bedtime PRN Constipation  fluticasone propionate 50 MICROgram(s)/spray Nasal Spray 1 Spray(s) Both Nostrils two times a day PRN nasal congestion  guaiFENesin Oral Liquid (Sugar-Free) 100 milliGRAM(s) Oral every 6 hours PRN Cough  melatonin 6 milliGRAM(s) Oral at bedtime PRN Insomnia  phenol 1.4% (CHLORASEPTIC) Oral Spray 1 Spray(s) Topical three times a day PRN sore throat  simethicone 80 milliGRAM(s) Chew three times a day PRN Gas

## 2021-09-17 NOTE — PROGRESS NOTE ADULT - PROBLEM SELECTOR PLAN 3
Pt states he doesn't feel like peeing.  Unable to obtain UA at this time   improved today- ?IBS  c/w  trial of bentyl  h. pylori negative 9/15/21 ?IBS, intermittent, but frequency and intensity improved since being on bentyl,   no bloating sensation or abdominal pain today  has been having normal BM daily for the last few days.   no n/v, no difficulty voiding  c/w  bentyl tolerating well  advised to try lactose free diet and follow up with GI  if develops retention of urine or constipation stop bentyl and call PMD.   h. pylori negative 9/15/21  plan d/w patient and daughter and wife at the bedside.

## 2021-09-17 NOTE — PROGRESS NOTE ADULT - REASON FOR ADMISSION
R MCA CVA

## 2021-09-21 ENCOUNTER — APPOINTMENT (OUTPATIENT)
Dept: INTERNAL MEDICINE | Facility: CLINIC | Age: 86
End: 2021-09-21
Payer: MEDICARE

## 2021-09-21 ENCOUNTER — NON-APPOINTMENT (OUTPATIENT)
Age: 86
End: 2021-09-21

## 2021-09-21 VITALS
WEIGHT: 165 LBS | SYSTOLIC BLOOD PRESSURE: 134 MMHG | TEMPERATURE: 97.7 F | RESPIRATION RATE: 14 BRPM | HEIGHT: 65 IN | BODY MASS INDEX: 27.49 KG/M2 | HEART RATE: 71 BPM | OXYGEN SATURATION: 96 % | DIASTOLIC BLOOD PRESSURE: 72 MMHG

## 2021-09-21 DIAGNOSIS — Z23 ENCOUNTER FOR IMMUNIZATION: ICD-10-CM

## 2021-09-21 DIAGNOSIS — Z86.73 PERSONAL HISTORY OF TRANSIENT ISCHEMIC ATTACK (TIA), AND CEREBRAL INFARCTION W/OUT RESIDUAL DEFICITS: ICD-10-CM

## 2021-09-21 DIAGNOSIS — K21.9 GASTRO-ESOPHAGEAL REFLUX DISEASE W/OUT ESOPHAGITIS: ICD-10-CM

## 2021-09-21 PROCEDURE — 99204 OFFICE O/P NEW MOD 45 MIN: CPT | Mod: 25

## 2021-09-21 PROCEDURE — 90662 IIV NO PRSV INCREASED AG IM: CPT

## 2021-09-21 PROCEDURE — G0008: CPT

## 2021-09-21 RX ORDER — AMPICILLIN 500 MG/1
500 CAPSULE ORAL 4 TIMES DAILY
Qty: 28 | Refills: 2 | Status: DISCONTINUED | COMMUNITY
Start: 2019-07-08 | End: 2021-09-21

## 2021-09-21 RX ORDER — LATANOPROST/PF 0.005 %
0.01 DROPS OPHTHALMIC (EYE)
Qty: 1 | Refills: 1 | Status: ACTIVE | COMMUNITY

## 2021-09-21 RX ORDER — PANTOPRAZOLE 40 MG/1
40 TABLET, DELAYED RELEASE ORAL DAILY
Qty: 1 | Refills: 1 | Status: ACTIVE | COMMUNITY

## 2021-09-21 RX ORDER — DICYCLOMINE HYDROCHLORIDE 10 MG/1
10 CAPSULE ORAL 3 TIMES DAILY
Qty: 30 | Refills: 0 | Status: DISCONTINUED | COMMUNITY
End: 2021-09-21

## 2021-09-21 NOTE — REVIEW OF SYSTEMS
[Constipation] : constipation [Negative] : Heme/Lymph [de-identified] : left arm weakness, slight left facial droop

## 2021-09-21 NOTE — END OF VISIT
[FreeTextEntry3] : "I, Sharron Driscoll, personally scribed the services dictated to me by Dr. Abdullahi Goel MD in this documentation on 09/21/2021 "\par \par "I Dr. Abdullahi Goel MD, personally performed the services described in this documentation on 09/21/2021 for the patient as scribed by Sharron Driscoll in my presence. I have reviewed and verified that all the information is accurate and true."\par \par

## 2021-09-21 NOTE — HISTORY OF PRESENT ILLNESS
[Spouse] : spouse [Family Member] : family member [FreeTextEntry1] : new establishing care  [de-identified] : LIZZIE HOLT is a 86 year old M who presents today for establishing care. Pt went to the hospital at [facility] for a CVA.  PT has left arm weakness and stroke affected left eye. Pt has had a left facial droop then went to rehab. Pt is slowly improving. Pt complains of hard stool.

## 2021-09-21 NOTE — PLAN
[FreeTextEntry1] : Follow up with cardiologist and neurologist \par Continue medications \par Stop Dicyclomine \par Trial of Senna

## 2021-09-21 NOTE — HEALTH RISK ASSESSMENT
[Good] : ~his/her~  mood as  good [Yes] : Yes [No] : In the past 12 months have you used drugs other than those required for medical reasons? No [No falls in past year] : Patient reported no falls in the past year [Assistive Device] : Patient uses an assistive device [0] : 2) Feeling down, depressed, or hopeless: Not at all (0) [PHQ-2 Negative - No further assessment needed] : PHQ-2 Negative - No further assessment needed [] : No [de-identified] : cane [[id number]] : 0

## 2021-09-23 LAB
25(OH)D3 SERPL-MCNC: 39.7 NG/ML
ALBUMIN SERPL ELPH-MCNC: 4.3 G/DL
ALP BLD-CCNC: 76 U/L
ALT SERPL-CCNC: 33 U/L
ANION GAP SERPL CALC-SCNC: 13 MMOL/L
AST SERPL-CCNC: 21 U/L
BASOPHILS # BLD AUTO: 0.08 K/UL
BASOPHILS NFR BLD AUTO: 1 %
BILIRUB SERPL-MCNC: 0.3 MG/DL
BUN SERPL-MCNC: 16 MG/DL
CALCIUM SERPL-MCNC: 9.9 MG/DL
CHLORIDE SERPL-SCNC: 102 MMOL/L
CHOLEST SERPL-MCNC: 101 MG/DL
CK SERPL-CCNC: 43 U/L
CO2 SERPL-SCNC: 24 MMOL/L
CREAT SERPL-MCNC: 0.95 MG/DL
EOSINOPHIL # BLD AUTO: 0.11 K/UL
EOSINOPHIL NFR BLD AUTO: 1.3 %
ESTIMATED AVERAGE GLUCOSE: 111 MG/DL
GLUCOSE SERPL-MCNC: 93 MG/DL
HBA1C MFR BLD HPLC: 5.5 %
HCT VFR BLD CALC: 41.6 %
HDLC SERPL-MCNC: 34 MG/DL
HGB BLD-MCNC: 14 G/DL
IMM GRANULOCYTES NFR BLD AUTO: 0.4 %
LDLC SERPL CALC-MCNC: 55 MG/DL
LYMPHOCYTES # BLD AUTO: 1.69 K/UL
LYMPHOCYTES NFR BLD AUTO: 20.5 %
MAN DIFF?: NORMAL
MCHC RBC-ENTMCNC: 33.7 GM/DL
MCHC RBC-ENTMCNC: 33.8 PG
MCV RBC AUTO: 100.5 FL
MONOCYTES # BLD AUTO: 0.89 K/UL
MONOCYTES NFR BLD AUTO: 10.8 %
NEUTROPHILS # BLD AUTO: 5.44 K/UL
NEUTROPHILS NFR BLD AUTO: 66 %
NONHDLC SERPL-MCNC: 67 MG/DL
PLATELET # BLD AUTO: 284 K/UL
POTASSIUM SERPL-SCNC: 4.2 MMOL/L
PROT SERPL-MCNC: 6.9 G/DL
RBC # BLD: 4.14 M/UL
RBC # FLD: 12.7 %
SODIUM SERPL-SCNC: 140 MMOL/L
TRIGL SERPL-MCNC: 62 MG/DL
TSH SERPL-ACNC: 1.29 UIU/ML
WBC # FLD AUTO: 8.24 K/UL

## 2021-10-07 ENCOUNTER — APPOINTMENT (OUTPATIENT)
Dept: CARDIOLOGY | Facility: CLINIC | Age: 86
End: 2021-10-07

## 2021-10-14 ENCOUNTER — APPOINTMENT (OUTPATIENT)
Dept: PHYSICAL MEDICINE AND REHAB | Facility: CLINIC | Age: 86
End: 2021-10-14

## 2021-10-15 ENCOUNTER — NON-APPOINTMENT (OUTPATIENT)
Age: 86
End: 2021-10-15

## 2021-10-15 ENCOUNTER — APPOINTMENT (OUTPATIENT)
Dept: NEUROLOGY | Facility: CLINIC | Age: 86
End: 2021-10-15

## 2021-10-26 PROCEDURE — 97167 OT EVAL HIGH COMPLEX 60 MIN: CPT

## 2021-10-26 PROCEDURE — 97110 THERAPEUTIC EXERCISES: CPT

## 2021-10-26 PROCEDURE — 97530 THERAPEUTIC ACTIVITIES: CPT

## 2021-10-26 PROCEDURE — 97163 PT EVAL HIGH COMPLEX 45 MIN: CPT

## 2021-10-26 PROCEDURE — 97535 SELF CARE MNGMENT TRAINING: CPT

## 2021-10-26 PROCEDURE — 94640 AIRWAY INHALATION TREATMENT: CPT

## 2021-10-26 PROCEDURE — 92523 SPEECH SOUND LANG COMPREHEN: CPT

## 2021-10-26 PROCEDURE — 97112 NEUROMUSCULAR REEDUCATION: CPT

## 2021-10-26 PROCEDURE — 87081 CULTURE SCREEN ONLY: CPT

## 2021-10-26 PROCEDURE — 87338 HPYLORI STOOL AG IA: CPT

## 2021-10-26 PROCEDURE — 85027 COMPLETE CBC AUTOMATED: CPT

## 2021-10-26 PROCEDURE — 92507 TX SP LANG VOICE COMM INDIV: CPT

## 2021-10-26 PROCEDURE — 80053 COMPREHEN METABOLIC PANEL: CPT

## 2021-10-26 PROCEDURE — 36415 COLL VENOUS BLD VENIPUNCTURE: CPT

## 2021-10-26 PROCEDURE — 85025 COMPLETE CBC W/AUTO DIFF WBC: CPT

## 2021-10-26 PROCEDURE — 97116 GAIT TRAINING THERAPY: CPT

## 2021-10-26 PROCEDURE — 92610 EVALUATE SWALLOWING FUNCTION: CPT

## 2021-10-26 PROCEDURE — 97542 WHEELCHAIR MNGMENT TRAINING: CPT

## 2021-10-26 PROCEDURE — U0003: CPT

## 2021-10-26 PROCEDURE — U0005: CPT

## 2022-05-25 NOTE — PROCEDURAL SAFETY CHECKLIST WITH OR WITHOUT SEDATION - NSBLDPRODCTAVAIL_GEN_ALL_CORE
Patient returned my call-transferred from [address] [place] [address] from front office. I reviewed coumadin instructions with the patient and he voiced understanding.   Patient aware needs to be done at hospital since we need to draw PT/INR level on arrival.    I called PAT to make sure they are aware of Pt/INR on arrival and was told they would tee it on their list. not applicable

## 2022-06-06 ENCOUNTER — APPOINTMENT (OUTPATIENT)
Dept: INTERNAL MEDICINE | Facility: CLINIC | Age: 87
End: 2022-06-06
Payer: MEDICARE

## 2022-06-06 VITALS
OXYGEN SATURATION: 95 % | SYSTOLIC BLOOD PRESSURE: 134 MMHG | WEIGHT: 165 LBS | HEART RATE: 77 BPM | RESPIRATION RATE: 16 BRPM | DIASTOLIC BLOOD PRESSURE: 68 MMHG | TEMPERATURE: 97.1 F | BODY MASS INDEX: 27.49 KG/M2 | HEIGHT: 65 IN

## 2022-06-06 DIAGNOSIS — E78.5 HYPERLIPIDEMIA, UNSPECIFIED: ICD-10-CM

## 2022-06-06 DIAGNOSIS — I63.9 CEREBRAL INFARCTION, UNSPECIFIED: ICD-10-CM

## 2022-06-06 PROCEDURE — 99214 OFFICE O/P EST MOD 30 MIN: CPT

## 2022-06-06 RX ORDER — CLOPIDOGREL 75 MG/1
75 TABLET, FILM COATED ORAL DAILY
Refills: 0 | Status: DISCONTINUED | COMMUNITY
End: 2022-06-06

## 2022-06-06 RX ORDER — ASPIRIN ENTERIC COATED TABLETS 81 MG 81 MG/1
81 TABLET, DELAYED RELEASE ORAL
Qty: 90 | Refills: 1 | Status: DISCONTINUED | COMMUNITY
End: 2022-06-06

## 2022-06-06 RX ORDER — ROSUVASTATIN CALCIUM 40 MG/1
40 TABLET, FILM COATED ORAL
Qty: 90 | Refills: 0 | Status: ACTIVE | COMMUNITY
Start: 2021-10-25

## 2022-06-06 RX ORDER — MONTELUKAST 10 MG/1
10 TABLET, FILM COATED ORAL
Qty: 1 | Refills: 1 | Status: DISCONTINUED | COMMUNITY
End: 2022-06-06

## 2022-06-06 RX ORDER — ALFUZOSIN HYDROCHLORIDE 10 MG/1
10 TABLET, EXTENDED RELEASE ORAL
Qty: 30 | Refills: 4 | Status: DISCONTINUED | COMMUNITY
Start: 2019-07-03 | End: 2022-06-06

## 2022-06-06 RX ORDER — APIXABAN 5 MG/1
5 TABLET, FILM COATED ORAL
Qty: 60 | Refills: 2 | Status: ACTIVE | COMMUNITY
Start: 2022-06-06

## 2022-06-06 RX ORDER — ATORVASTATIN CALCIUM 80 MG/1
80 TABLET, FILM COATED ORAL DAILY
Qty: 90 | Refills: 1 | Status: DISCONTINUED | COMMUNITY
End: 2022-06-06

## 2022-06-06 NOTE — HEALTH RISK ASSESSMENT
[Former] : Former [Yes] : Yes [No] : In the past 12 months have you used drugs other than those required for medical reasons? No [No falls in past year] : Patient reported no falls in the past year [0] : 2) Feeling down, depressed, or hopeless: Not at all (0) [PHQ-2 Negative - No further assessment needed] : PHQ-2 Negative - No further assessment needed [[id number]] : 0

## 2022-06-06 NOTE — PLAN
[FreeTextEntry1] : Continue medications \par Further instructions pending lab results \par Follow up with GI \par

## 2022-06-06 NOTE — HISTORY OF PRESENT ILLNESS
[FreeTextEntry1] : follow up  [de-identified] : LIZZIE HOLT is a 86 year old M who presents today for follow up for cholesterol. Pt complains of gassiness, burping and belching for the past 8-9 months. Pt has been prescribed Dicyclomine 10 mg with little relief. Pt has also been taking OTC supplements for gut health.

## 2022-06-06 NOTE — END OF VISIT
[FreeTextEntry3] : "I, Sharron Driscoll, personally scribed the services dictated to me by Dr. Abdullahi Goel MD in this documentation on 06/06/2022 " \par \par "I Dr. Abdullahi Goel MD, personally performed the services described in this documentation on 06/06/2022 for the patient as scribed by Sharron Driscoll in my presence. I have reviewed and verified that all the information is accurate and true."\par

## 2022-06-26 ENCOUNTER — NON-APPOINTMENT (OUTPATIENT)
Age: 87
End: 2022-06-26

## 2022-07-29 ENCOUNTER — APPOINTMENT (OUTPATIENT)
Dept: GASTROENTEROLOGY | Facility: CLINIC | Age: 87
End: 2022-07-29

## 2022-08-01 ENCOUNTER — APPOINTMENT (OUTPATIENT)
Dept: INTERNAL MEDICINE | Facility: CLINIC | Age: 87
End: 2022-08-01

## 2022-08-01 VITALS
WEIGHT: 165 LBS | TEMPERATURE: 97.8 F | DIASTOLIC BLOOD PRESSURE: 82 MMHG | SYSTOLIC BLOOD PRESSURE: 144 MMHG | HEART RATE: 76 BPM | HEIGHT: 65 IN | BODY MASS INDEX: 27.49 KG/M2 | OXYGEN SATURATION: 97 % | RESPIRATION RATE: 16 BRPM

## 2022-08-01 VITALS — SYSTOLIC BLOOD PRESSURE: 130 MMHG | DIASTOLIC BLOOD PRESSURE: 74 MMHG

## 2022-08-01 DIAGNOSIS — R73.03 PREDIABETES.: ICD-10-CM

## 2022-08-01 DIAGNOSIS — B02.30 ZOSTER OCULAR DISEASE, UNSPECIFIED: ICD-10-CM

## 2022-08-01 DIAGNOSIS — E78.00 PURE HYPERCHOLESTEROLEMIA, UNSPECIFIED: ICD-10-CM

## 2022-08-01 DIAGNOSIS — R10.32 LEFT LOWER QUADRANT PAIN: ICD-10-CM

## 2022-08-01 PROCEDURE — 99214 OFFICE O/P EST MOD 30 MIN: CPT

## 2022-08-01 RX ORDER — ACYCLOVIR 800 MG/1
800 TABLET ORAL TWICE DAILY
Refills: 0 | Status: ACTIVE | COMMUNITY

## 2022-08-01 NOTE — PHYSICAL EXAM
[No Acute Distress] : no acute distress [Well Nourished] : well nourished [Well Developed] : well developed [Well-Appearing] : well-appearing [Normal Sclera/Conjunctiva] : normal sclera/conjunctiva [PERRL] : pupils equal round and reactive to light [EOMI] : extraocular movements intact [Normal Outer Ear/Nose] : the outer ears and nose were normal in appearance [Normal Oropharynx] : the oropharynx was normal [No JVD] : no jugular venous distention [No Lymphadenopathy] : no lymphadenopathy [Supple] : supple [Thyroid Normal, No Nodules] : the thyroid was normal and there were no nodules present [No Respiratory Distress] : no respiratory distress  [No Accessory Muscle Use] : no accessory muscle use [Clear to Auscultation] : lungs were clear to auscultation bilaterally [Normal Rate] : normal rate  [Regular Rhythm] : with a regular rhythm [Normal S1, S2] : normal S1 and S2 [No Murmur] : no murmur heard [No Carotid Bruits] : no carotid bruits [No Abdominal Bruit] : a ~M bruit was not heard ~T in the abdomen [No Varicosities] : no varicosities [Pedal Pulses Present] : the pedal pulses are present [No Edema] : there was no peripheral edema [No Palpable Aorta] : no palpable aorta [No Extremity Clubbing/Cyanosis] : no extremity clubbing/cyanosis [Soft] : abdomen soft [Non Tender] : non-tender [Non-distended] : non-distended [No Masses] : no abdominal mass palpated [No HSM] : no HSM [Normal Bowel Sounds] : normal bowel sounds [Normal Posterior Cervical Nodes] : no posterior cervical lymphadenopathy [Normal Anterior Cervical Nodes] : no anterior cervical lymphadenopathy [No CVA Tenderness] : no CVA  tenderness [No Spinal Tenderness] : no spinal tenderness [No Joint Swelling] : no joint swelling [Grossly Normal Strength/Tone] : grossly normal strength/tone [No Rash] : no rash [Coordination Grossly Intact] : coordination grossly intact [No Focal Deficits] : no focal deficits [Normal Gait] : normal gait [Deep Tendon Reflexes (DTR)] : deep tendon reflexes were 2+ and symmetric [Normal Affect] : the affect was normal [Normal Insight/Judgement] : insight and judgment were intact [de-identified] : Redness of the right eye  [de-identified] : hearing aids

## 2022-08-01 NOTE — PLAN
[FreeTextEntry1] : pre-diabetic \par discussed proper diet\par continue medications\par follow up with Opthalmology and GI Doctors\par Declines pneumonia vaccine\par labs discussed

## 2022-08-01 NOTE — REVIEW OF SYSTEMS
[Redness] : redness [Vision Problems] : vision problems [Hearing Loss] : hearing loss [FreeTextEntry3] : redness of the right eye

## 2022-08-01 NOTE — HEALTH RISK ASSESSMENT
[Never] : Never [No] : In the past 12 months have you used drugs other than those required for medical reasons? No [No falls in past year] : Patient reported no falls in the past year [0] : 2) Feeling down, depressed, or hopeless: Not at all (0) [PHQ-2 Negative - No further assessment needed] : PHQ-2 Negative - No further assessment needed [[id number]] : 0

## 2022-08-01 NOTE — END OF VISIT
[FreeTextEntry3] : "I, hCitra Lewis, personally scribed the services dictated to me by Dr. Abdullahi Goel MD in this documentation on 08/01/2022 " \par \par "I Dr. Abdullahi Goel MD, personally performed the services described in this documentation on 08/01/2022 for the patient as scribed by Chitra Lewis in my presence. I have reviewed and verified that all the information is accurate and true."

## 2022-08-01 NOTE — HISTORY OF PRESENT ILLNESS
[Spouse] : spouse [FreeTextEntry1] : follow up  [de-identified] : LIZZIE HOLT is a 86 year old M who presents today for follow up for his high cholesterol. Patient is complaining of pain and redness in right eye. 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 \par \par Patient has an appt. on Friday with his GI. \par \par Patient has pre-diabetes.

## 2022-08-05 ENCOUNTER — APPOINTMENT (OUTPATIENT)
Dept: GASTROENTEROLOGY | Facility: CLINIC | Age: 87
End: 2022-08-05

## 2022-08-05 VITALS
WEIGHT: 171 LBS | SYSTOLIC BLOOD PRESSURE: 147 MMHG | HEART RATE: 62 BPM | OXYGEN SATURATION: 95 % | BODY MASS INDEX: 28.49 KG/M2 | HEIGHT: 65 IN | DIASTOLIC BLOOD PRESSURE: 77 MMHG

## 2022-08-05 DIAGNOSIS — R14.1 FLATULENCE: ICD-10-CM

## 2022-08-05 DIAGNOSIS — R14.2 FLATULENCE: ICD-10-CM

## 2022-08-05 DIAGNOSIS — K44.9 DIAPHRAGMATIC HERNIA W/OUT OBSTRUCTION OR GANGRENE: ICD-10-CM

## 2022-08-05 DIAGNOSIS — R14.3 FLATULENCE: ICD-10-CM

## 2022-08-05 DIAGNOSIS — R14.0 ABDOMINAL DISTENSION (GASEOUS): ICD-10-CM

## 2022-08-05 PROCEDURE — 99203 OFFICE O/P NEW LOW 30 MIN: CPT

## 2022-08-05 RX ORDER — VALACYCLOVIR 500 MG/1
500 TABLET, FILM COATED ORAL
Qty: 60 | Refills: 0 | Status: ACTIVE | COMMUNITY
Start: 2022-08-02

## 2022-08-05 NOTE — ASSESSMENT
[FreeTextEntry1] : 87 yo male s/p CVA in 9/2021 c/o eructation, abdominal bloating and flatulence.\par Not a good candidate for EGD.

## 2022-08-05 NOTE — HISTORY OF PRESENT ILLNESS
[Heartburn] : stable heartburn [Nausea] : denies nausea [Vomiting] : denies vomiting [Diarrhea] : denies diarrhea [Constipation] : denies constipation [Yellow Skin Or Eyes (Jaundice)] : denies jaundice [Abdominal Pain] : denies abdominal pain [Abdominal Swelling] : abdominal swelling stable [Rectal Pain] : denies rectal pain [GERD] : gastroesophageal reflux disease [Hiatus Hernia] : hiatus hernia [Peptic Ulcer Disease] : no peptic ulcer disease [Pancreatitis] : no pancreatitis [Cholelithiasis] : no cholelithiasis [Kidney Stone] : no kidney stone [Inflammatory Bowel Disease] : no inflammatory bowel disease [Irritable Bowel Syndrome] : no irritable bowel syndrome [Diverticulitis] : no diverticulitis [Alcohol Abuse] : no alcohol abuse [Malignancy] : no malignancy [_________] : Performed [unfilled] [de-identified] : 87 yo male s/p CVA in September 2021 c/o worsening eructation, bloating and flatulence. Believes he is lactose intolerant and used to use lactaid milk in the past.Used Gas X w/o much benefit. Had EGD and colonoscopy ` 15-20 years ago, the results of which are not known. Quit smoking in 1973. Moves bowels regularly. [de-identified] : ? results [de-identified] : ? results

## 2022-08-13 LAB — H PYLORI AG STL QL: NEGATIVE

## 2022-09-13 ENCOUNTER — APPOINTMENT (OUTPATIENT)
Dept: GASTROENTEROLOGY | Facility: CLINIC | Age: 87
End: 2022-09-13

## 2022-09-15 NOTE — HISTORY OF PRESENT ILLNESS
[FreeTextEntry1] : 87-year-old male with history of gastroesophageal reflux disease maintained on pantoprazole 40 mg daily complaining of eructation, abdominal bloating, and excessive flatulence.  Patient had a stool for Helicobacter pylori antigen which was negative.  A lactose tolerance test was not not consistent with lactose intolerance.

## 2022-11-18 NOTE — PATIENT PROFILE ADULT - NUMBER OF YRS
0 Cimzia Counseling:  I discussed with the patient the risks of Cimzia including but not limited to immunosuppression, allergic reactions and infections.  The patient understands that monitoring is required including a PPD at baseline and must alert us or the primary physician if symptoms of infection or other concerning signs are noted.

## 2023-05-23 NOTE — DISCHARGE NOTE NURSING/CASE MANAGEMENT/SOCIAL WORK - PATIENT PORTAL LINK FT
good balance You can access the FollowMyHealth Patient Portal offered by [facility] by registering at the following website: [email or web address]. By joining [employer]’s FollowMyHealth portal, you will also be able to view your health information using other applications (apps) compatible with our system.

## 2024-01-10 NOTE — PATIENT PROFILE ADULT - HEALTH LITERACY
no X Size Of Lesion In Cm (Optional): 0 Size Of Lesion: 2 Detail Level: Detailed Other Plan: With Dr. Saleh

## 2024-04-07 NOTE — ED PROVIDER NOTE - INTERNATIONAL TRAVEL
Informed Consent for Blood Component Transfusion Note    I have discussed with the patient the rationale for blood component transfusion; its benefits in treating or preventing fatigue, organ damage, or death; and its risk which includes mild transfusion reactions, rare risk of blood borne infection, or more serious but rare reactions. I have discussed the alternatives to transfusion, including the risk and consequences of not receiving transfusion. The patient had an opportunity to ask questions and had agreed to proceed with transfusion of blood components.    Electronically signed by Yifan Neff DO on 4/6/24 at 11:38 PM EDT   No

## 2024-05-23 NOTE — PROGRESS NOTE ADULT - PROBLEM/PLAN-11
_________________________________________________________________________________________  ========>>  M E D I C A L   A T T E N D I N G    F O L L O W  U P  N O T E  <<=========  -----------------------------------------------------------------------------------------------------    - Patient seen and examined by me earlier today.   - Patient today overall doing great ambulating in the hallways, no diarrhea no pain, eating well. no SOB.. no other complains of         WBC elevated but better on repeat ... patient planned to go home today     ==================>> REVIEW OF SYSTEM <<=================    GEN: no fever, no chills, as above   RESP: no SOB, no cough, no sputum  CVS: no chest pain, no palpitations  GI: no abdominal pain, no nausea  : no dysuria, no frequency  Neuro: no headache, no dizziness    ==================>> PHYSICAL EXAM <<=================    GEN: A&O X 3 , NAD , comfortable, pleasant, calm in chair   HEENT: NCAT, PERRL, MMM, hearing intact  CVS: S1S2 , regular , No M/R/G appreciated  PULM: + bilateral mild wheezing !! , no rhonchi   ABD.: soft. non tender, non distended,  bowel sounds present  Extrem: intact pulses , no edema        ( Note written / Date of service 05-23-24 ( This is certified to be the same as "ENTERED" date above ( for billing purposes)))    ==================>> MEDICATIONS <<====================    chlorhexidine 2% Cloths 1 Application(s) Topical daily  ciprofloxacin     Tablet 500 milliGRAM(s) Oral every 12 hours  furosemide    Tablet 20 milliGRAM(s) Oral daily  lactobacillus acidophilus 1 Tablet(s) Oral two times a day with meals  metoprolol succinate ER 50 milliGRAM(s) Oral daily  multivitamin 1 Tablet(s) Oral daily    MEDICATIONS  (PRN):  acetaminophen     Tablet .. 650 milliGRAM(s) Oral every 6 hours PRN Temp greater or equal to 38C (100.4F), Mild Pain (1 - 3)  aluminum hydroxide/magnesium hydroxide/simethicone Suspension 30 milliLiter(s) Oral every 4 hours PRN Dyspepsia  melatonin 3 milliGRAM(s) Oral at bedtime PRN Insomnia  ondansetron Injectable 4 milliGRAM(s) IV Push every 8 hours PRN Nausea and/or Vomiting    ___________  Active diet:  Diet, Regular  ___________________    ==================>> VITAL SIGNS <<==================    Vital Signs Last 24 HrsT(C): 36.6 (05-23-24 @ 05:14)  T(F): 97.8 (05-23-24 @ 05:14), Max: 98.5 (05-22-24 @ 21:21)  HR: 82 (05-23-24 @ 05:14) (81 - 82)  BP: 113/67 (05-23-24 @ 05:14)  RR: 18 (05-23-24 @ 05:14) (18 - 19)  SpO2: 95% (05-23-24 @ 05:14) (92% - 95%)       ==================>> LAB AND IMAGING <<==================                        14.1   15.25 )-----------( 261      ( 23 May 2024 11:29 )             41.5        05-23    140  |  98  |  18  ----------------------------<  133<H>  3.7   |  27  |  0.92    Ca    8.7      23 May 2024 07:14  Phos  2.1     05-23  Mg     1.8     05-23    TPro  5.9<L>  /  Alb  3.1<L>  /  TBili  0.8  /  DBili  x   /  AST  24  /  ALT  27  /  AlkPhos  74  05-23    WBC count:   15.25 <<== ,  16.02 <<== ,  10.26 <<== ,  10.59 <<== ,  12.23 <<==   Hemoglobin:   14.1 <<==,  13.4 <<==,  13.0 <<==,  12.9 <<==,  13.2 <<==  platelets:  261 <==, 245 <==, 181 <==, 187 <==, 195 <==    Creatinine:  0.92  <<==, 0.86  <<==, 0.74  <<==, 0.86  <<==  Sodium:   140  <==, 141  <==, 138  <==, 137  <==       AST:          24(05-23) <== , 36(05-20) <== , 41(05-18) <==      ALT:        27(05-23)  <== , 36(05-20)  <== , 29(05-18)  <==      AP:        74(05-23)  <=, 77(05-20)  <=, 83(05-18)  <=     Bili:        0.8(05-23)  <=, 0.6(05-20)  <=, 0.9(05-18)  <=    ____________________________    M I C R O B I O L O G Y :    Culture - Urine (collected 19 May 2024 02:45)  Source: Clean Catch Clean Catch (Midstream)  Final Report (22 May 2024 08:47):    50,000 - 99,000 CFU/mL Escherichia coli    <10,000 CFU/ml Normal Urogenital jamil present  Organism: Escherichia coli (22 May 2024 08:47)  Organism: Escherichia coli (22 May 2024 08:47)    Sensitivities:      Method Type: ADELA      -  Amoxicillin/Clavulanic Acid: R >16/8      -  Ampicillin: R >16 These ampicillin results predict results for amoxicillin      -  Ampicillin/Sulbactam: S 8/4      -  Aztreonam: S <=4      -  Cefazolin: R >16 For uncomplicated UTI with K. pneumoniae, E. coli, or P. mirablis: ADELA <=16 is sensitive and ADELA >=32 is resistant. This also predicts results for oral agents cefaclor, cefdinir, cefpodoxime, cefprozil, cefuroxime axetil, cephalexin and locarbef for uncomplicated UTI. Note that some isolates may be susceptible to these agents while testing resistant to cefazolin.      -  Cefepime: S <=2      -  Cefoxitin: R >16      -  Ceftriaxone: R 8      -  Cefuroxime: I 16      -  Ciprofloxacin: S <=0.25      -  Ertapenem: S <=0.5      -  Gentamicin: S <=2      -  Imipenem: S <=1      -  Levofloxacin: S <=0.5      -  Meropenem: S <=1      -  Nitrofurantoin: S <=32 Should not be used to treat pyelonephritis      -  Piperacillin/Tazobactam: S <=8      -  Tobramycin: S <=2      -  Trimethoprim/Sulfamethoxazole: S <=0.5/9.5    Culture - Blood (collected 18 May 2024 22:58)  Source: .Blood Blood-Peripheral  Preliminary Report (23 May 2024 05:00):    No growth at 4 days    Culture - Blood (collected 18 May 2024 22:58)  Source: .Blood Blood-Peripheral  Preliminary Report (23 May 2024 05:00):    No growth at 4 days      < from: TTE W or WO Ultrasound Enhancing Agent (05.22.24 @ 10:07) >  CONCLUSIONS:   1. Left ventricular cavity is small. Left ventricular wall thickness is normal. Left ventricular systolic function is normal with an ejection fraction of 62 % by Woodson's method of disks.   2. The left ventricular diastolic function is indeterminate.   3. Normal right ventricular cavity size, with normal wall thickness, and normal systolic function.   4. The left atrium is moderately dilated.   5. No echocardiographic evidence of vegetations.   6. The right atrium is dilated.   7. Mild mitral regurgitation.   8. Mild aortic regurgitation.   9. No prior echocardiogram is available for comparison.  < end of copied text >    < from: MR Abdomen w/wo IV Cont (05.20.24 @ 17:46) >  IMPRESSION:  A 7 cm complex cystic left parapelvic renal lesion of uncertain etiology;   differential includes infected parapelvic cyst. A cystic neoplasm is   considered less likely.  < end of copied text >       < from: CT Chest No Cont (05.20.24 @ 06:16) >  IMPRESSION:.  Partially imaged 6.5 cm left renal cystic mass with suspected soft tissue   wall thickening. This mass is not adequately assessed on this   examination. Dedicated or MRI of the abdomen is recommended to further   assess as infection/abscess and renal malignancy are diagnostic  considerations.  No evidence of pneumonia.  Small left pleural effusion.  < end of copied text >      ___________________________________________________________________________________  ===============>>  A S S E S S M E N T   A N D   P L A N <<===============  ------------------------------------------------------------------------------------------    · Assessment	  Patient is a 86yo Male with past medical history of HTN, prostate cancer post RT, prior syncope and ?Afib on coumadin, sent in by PMD for failed oral antibiotics treatment as outpatient for pneumonia ..  patient states for past two weeks patient has had weakness, chills, and burning with urination  ( which has now improved) , no fever, no couhg.. patient started on Augmentin which did not help much , patient sent in for IV antibiotics..  in ED patient found with LLL pneumonia and UTI, admitted for further management  patient mildly hypoxemic on room air ( low 90s), + some SOB ..   patient lives alone, has been recently needing to use his cane due to weakness..         Problem/Plan - 1:  ·  Problem:  pleural effusion on CT scan      diuretics per cardio as ordered >> will DC on discharge ..         echo Negative for heart failure; mild MR  monitor vitals / O2, labs    Problem/Plan - 2:  ·  Problem: Acute UTI.   ·  Plan: continue antibiotics per ID > finishing   encourage hydration  PT evaluation given weakness  supportive care otherwise.    ## renal cystic mass seen on CT     MRI as above       consulted > appreciated        >> outpatient follow up with      Problem/Plan - 3:  ·  Problem: Chronic atrial fibrillation.   ·  Plan: metoprolol  coumadin per INR >> 2 mg coumadin on DC  monitor INR as outpatient   cardio on board     I N R :  2.72  <<==, 3.90  <<==, 3.77  <<==, 3.85  <<==, 3.33  <<==    Problem/Plan - 4:  ·  Problem: Hypertension.   ·  Plan: Continue home medications and monitor.    -GI/DVT Prophylaxis per protocol.    DC planing today    --------------------------------------------  Case discussed with patient.. Nurse Practitioner ( discussed with son)   Education given on findings and plan of care  ___________________________  H. ISHA Deshpande.  Pager: [phone]    
DISPLAY PLAN FREE TEXT

## 2024-06-03 ENCOUNTER — NON-APPOINTMENT (OUTPATIENT)
Age: 89
End: 2024-06-03